# Patient Record
Sex: FEMALE | Race: WHITE | Employment: UNEMPLOYED | URBAN - NONMETROPOLITAN AREA
[De-identification: names, ages, dates, MRNs, and addresses within clinical notes are randomized per-mention and may not be internally consistent; named-entity substitution may affect disease eponyms.]

---

## 2017-01-06 ENCOUNTER — MED REC SCAN ONLY (OUTPATIENT)
Dept: FAMILY MEDICINE CLINIC | Facility: CLINIC | Age: 57
End: 2017-01-06

## 2017-01-12 ENCOUNTER — TELEPHONE (OUTPATIENT)
Dept: FAMILY MEDICINE CLINIC | Facility: CLINIC | Age: 57
End: 2017-01-12

## 2017-01-12 NOTE — TELEPHONE ENCOUNTER
No documentation regarding referral or order request. Notified Gricelda Khoury that she would have to wait to discuss with Dr Kathy Cunningham RN tomorrow. She voiced understanding and will keep lab results until notified otherwise.

## 2017-01-13 NOTE — TELEPHONE ENCOUNTER
Please notify Rivka Gotti that Dr. [facility] Gaitan group does not see Medicaid patients. Please refer her to [facility] rheumatology regarding her SLE.

## 2017-01-13 NOTE — TELEPHONE ENCOUNTER
Per last OV Dr Jorge Post was referring the patient to this doctor  Calling Pranav back to advise that the patient should be calling to schedule an appointment    They asked her insurance and I advised medicaid and they do not accept this insurance - if th

## 2017-01-19 ENCOUNTER — TELEPHONE (OUTPATIENT)
Dept: FAMILY MEDICINE CLINIC | Facility: CLINIC | Age: 57
End: 2017-01-19

## 2017-01-19 ENCOUNTER — OFFICE VISIT (OUTPATIENT)
Dept: FAMILY MEDICINE CLINIC | Facility: CLINIC | Age: 57
End: 2017-01-19

## 2017-01-19 VITALS
WEIGHT: 263 LBS | HEIGHT: 68.25 IN | SYSTOLIC BLOOD PRESSURE: 118 MMHG | HEART RATE: 75 BPM | BODY MASS INDEX: 39.86 KG/M2 | TEMPERATURE: 98 F | DIASTOLIC BLOOD PRESSURE: 80 MMHG | OXYGEN SATURATION: 95 %

## 2017-01-19 DIAGNOSIS — Z87.410 HISTORY OF CERVICAL DYSPLASIA: ICD-10-CM

## 2017-01-19 DIAGNOSIS — M32.9 SYSTEMIC LUPUS ERYTHEMATOSUS, UNSPECIFIED SLE TYPE, UNSPECIFIED ORGAN INVOLVEMENT STATUS (HCC): ICD-10-CM

## 2017-01-19 DIAGNOSIS — Z86.69 HISTORY OF SLEEP APNEA: ICD-10-CM

## 2017-01-19 DIAGNOSIS — Z79.891 ADMISSION FOR LONG-TERM OPIATE ANALGESIC USE: ICD-10-CM

## 2017-01-19 DIAGNOSIS — J44.9 CHRONIC OBSTRUCTIVE PULMONARY DISEASE, UNSPECIFIED COPD TYPE (HCC): Primary | ICD-10-CM

## 2017-01-19 DIAGNOSIS — Z86.711 HISTORY OF PULMONARY EMBOLUS (PE): ICD-10-CM

## 2017-01-19 DIAGNOSIS — Z79.01 LONG TERM (CURRENT) USE OF ANTICOAGULANTS: ICD-10-CM

## 2017-01-19 DIAGNOSIS — D12.6 TUBULAR ADENOMA OF COLON: ICD-10-CM

## 2017-01-19 DIAGNOSIS — E27.40 ADRENAL INSUFFICIENCY (HCC): ICD-10-CM

## 2017-01-19 LAB — INR: 2.3 (ref 0.8–1.3)

## 2017-01-19 PROCEDURE — 99214 OFFICE O/P EST MOD 30 MIN: CPT | Performed by: FAMILY MEDICINE

## 2017-01-19 RX ORDER — MAG HYDROX/ALUMINUM HYD/SIMETH 400-400-40
2000 SUSPENSION, ORAL (FINAL DOSE FORM) ORAL
COMMUNITY

## 2017-01-19 NOTE — PROGRESS NOTES
Coby Tony is a 61year old female. Patient presents with: Other: pt inr check & discuss health problems inrm 1      HPI:   We received many of the records from [facility] [address]   Specifically the records from her pulmonologist in her ob facility-administered medications on file prior to visit.      Past Medical History   Diagnosis Date   • HTN (hypertension)    • COPD (chronic obstructive pulmonary disease) [facility]      PFTs 8/2016, [facility]   • SLE (systemic lupus erythematosus) ([facility] unspecified      COMPARISON:  None.      TECHNIQUE:  PA and lateral chest radiographs were obtained.      PATIENT STATED HISTORY:  Routine follow-up. History of COPD,HTN, pulmonary embolism, and Lupus           FINDINGS:     LUNGS:  No consolidation.  Edison polyps especially tubular adenoma, she is at more risk. She will consider it in the future.       Orders Placed This Encounter  [facility]    No prescriptions requested or ordered in this encounter       Imaging & Con

## 2017-01-20 ENCOUNTER — TELEPHONE (OUTPATIENT)
Dept: FAMILY MEDICINE CLINIC | Facility: CLINIC | Age: 57
End: 2017-01-20

## 2017-01-20 RX ORDER — FOLIC ACID 1 MG/1
2 TABLET ORAL DAILY
Qty: 60 TABLET | Refills: 11 | Status: SHIPPED | OUTPATIENT
Start: 2017-01-20

## 2017-01-20 NOTE — TELEPHONE ENCOUNTER
Patient needs to call to schedule appointments at [facility] for [facility]ER[facility], pulmonology, and Rheumatology   [phone] scheduling dept for [facility] the patient to advise- I gave her the number

## 2017-02-06 RX ORDER — HYDROCODONE BITARTRATE AND ACETAMINOPHEN 5; 325 MG/1; MG/1
1 TABLET ORAL EVERY 6 HOURS PRN
Qty: 30 TABLET | Refills: 0 | Status: SHIPPED | OUTPATIENT
Start: 2017-02-06 | End: 2017-08-28 | Stop reason: ALTCHOICE

## 2017-02-06 NOTE — TELEPHONE ENCOUNTER
NEED SCRIPT FOR VICODIN, ALSO SHE IS HAVING TROUBLE GETTING [facility] TO GET OVERNIGHT OXIMETRY, ALSO HER PULMONOLOGIST DR NURA SANTANA, SHE HAS APPT WITH 3/8 @ [address] [place] [address] IN [place], THAT  WANTS DISC WITH ALL HER TEST RESULTS, ETC.. RELATED T

## 2017-02-06 NOTE — TELEPHONE ENCOUNTER
Calling Britton Nieto to advise that her script is ready for -   Left detailed message and the script is at the        I will be faxing order the [facility],   I will be faxing the records to the pulmonologist- we are not able to

## 2017-02-07 ENCOUNTER — MED REC SCAN ONLY (OUTPATIENT)
Dept: FAMILY MEDICINE CLINIC | Facility: CLINIC | Age: 57
End: 2017-02-07

## 2017-02-17 ENCOUNTER — NURSE ONLY (OUTPATIENT)
Dept: FAMILY MEDICINE CLINIC | Facility: CLINIC | Age: 57
End: 2017-02-17

## 2017-02-17 ENCOUNTER — TELEPHONE (OUTPATIENT)
Dept: FAMILY MEDICINE CLINIC | Facility: CLINIC | Age: 57
End: 2017-02-17

## 2017-02-17 DIAGNOSIS — I10 ESSENTIAL HYPERTENSION: Primary | ICD-10-CM

## 2017-02-17 DIAGNOSIS — M32.9 SYSTEMIC LUPUS ERYTHEMATOSUS, UNSPECIFIED SLE TYPE, UNSPECIFIED ORGAN INVOLVEMENT STATUS (HCC): ICD-10-CM

## 2017-02-17 DIAGNOSIS — E27.40 ADRENAL INSUFFICIENCY (HCC): ICD-10-CM

## 2017-02-17 DIAGNOSIS — Z86.711 HISTORY OF PULMONARY EMBOLUS (PE): Primary | ICD-10-CM

## 2017-02-17 LAB — INR: 2 (ref 0.8–1.2)

## 2017-02-17 PROCEDURE — 85610 PROTHROMBIN TIME: CPT | Performed by: FAMILY MEDICINE

## 2017-02-17 RX ORDER — WARFARIN SODIUM 10 MG/1
10 TABLET ORAL NIGHTLY
Refills: 0 | COMMUNITY
Start: 2017-02-17 | End: 2017-07-13

## 2017-02-17 NOTE — TELEPHONE ENCOUNTER
Patient is asking if she can have fasting labs next time she come in for her INR?    I advised that I will confirm with  and call her tomorrow on Saturday  Patient verbalized her understanding

## 2017-02-18 NOTE — TELEPHONE ENCOUNTER
----- Message from Rod Davila DO sent at 2/18/2017  7:43 AM CST -----  INR in therapeutic range  Continue same coumadin  Recheck INR  in 1 month

## 2017-03-16 RX ORDER — LORAZEPAM 0.5 MG/1
1 TABLET ORAL DAILY
Qty: 60 TABLET | Refills: 2 | Status: SHIPPED | OUTPATIENT
Start: 2017-03-16 | End: 2017-06-19

## 2017-03-17 ENCOUNTER — TELEPHONE (OUTPATIENT)
Dept: FAMILY MEDICINE CLINIC | Facility: CLINIC | Age: 57
End: 2017-03-17

## 2017-03-17 NOTE — TELEPHONE ENCOUNTER
Called the patient and advised that the insurance will no longer cover her Lorazepam so  would like her to come to discuss other alternatives.    The patient asked why it was not covered- I explained that I am not sure they sent a fax that it is not cover

## 2017-03-23 ENCOUNTER — LAB ENCOUNTER (OUTPATIENT)
Dept: LAB | Age: 57
End: 2017-03-23
Attending: FAMILY MEDICINE
Payer: MEDICAID

## 2017-03-23 ENCOUNTER — OFFICE VISIT (OUTPATIENT)
Dept: FAMILY MEDICINE CLINIC | Facility: CLINIC | Age: 57
End: 2017-03-23

## 2017-03-23 VITALS
DIASTOLIC BLOOD PRESSURE: 74 MMHG | OXYGEN SATURATION: 98 % | SYSTOLIC BLOOD PRESSURE: 130 MMHG | HEART RATE: 63 BPM | WEIGHT: 265.25 LBS | TEMPERATURE: 98 F | HEIGHT: 68.25 IN | BODY MASS INDEX: 40.2 KG/M2

## 2017-03-23 DIAGNOSIS — M32.9 SYSTEMIC LUPUS ERYTHEMATOSUS, UNSPECIFIED SLE TYPE, UNSPECIFIED ORGAN INVOLVEMENT STATUS (HCC): ICD-10-CM

## 2017-03-23 DIAGNOSIS — Z79.01 LONG TERM (CURRENT) USE OF ANTICOAGULANTS: ICD-10-CM

## 2017-03-23 DIAGNOSIS — I10 ESSENTIAL HYPERTENSION: ICD-10-CM

## 2017-03-23 DIAGNOSIS — E27.40 ADRENAL INSUFFICIENCY (HCC): ICD-10-CM

## 2017-03-23 DIAGNOSIS — Z86.711 HISTORY OF PULMONARY EMBOLUS (PE): ICD-10-CM

## 2017-03-23 DIAGNOSIS — N18.30 CKD (CHRONIC KIDNEY DISEASE) STAGE 3, GFR 30-59 ML/MIN (HCC): Primary | ICD-10-CM

## 2017-03-23 LAB
ALBUMIN SERPL-MCNC: 3.8 G/DL (ref 3.5–4.8)
ALP LIVER SERPL-CCNC: 62 U/L (ref 46–118)
ALT SERPL-CCNC: 17 U/L (ref 14–54)
AST SERPL-CCNC: 14 U/L (ref 15–41)
BASOPHILS # BLD AUTO: 0.06 X10(3) UL (ref 0–0.1)
BASOPHILS NFR BLD AUTO: 0.6 %
BILIRUB SERPL-MCNC: 0.4 MG/DL (ref 0.1–2)
BUN BLD-MCNC: 19 MG/DL (ref 8–20)
CALCIUM BLD-MCNC: 9 MG/DL (ref 8.3–10.3)
CHLORIDE: 105 MMOL/L (ref 101–111)
CHOLEST SMN-MCNC: 204 MG/DL (ref ?–200)
CO2: 27 MMOL/L (ref 22–32)
CREAT BLD-MCNC: 1.25 MG/DL (ref 0.55–1.02)
EOSINOPHIL # BLD AUTO: 0.16 X10(3) UL (ref 0–0.3)
EOSINOPHIL NFR BLD AUTO: 1.5 %
ERYTHROCYTE [DISTWIDTH] IN BLOOD BY AUTOMATED COUNT: 14.1 % (ref 11.5–16)
EST. AVERAGE GLUCOSE BLD GHB EST-MCNC: 111 MG/DL (ref 68–126)
GLUCOSE BLD-MCNC: 115 MG/DL (ref 70–99)
HBA1C MFR BLD HPLC: 5.5 % (ref ?–5.7)
HCT VFR BLD AUTO: 44.2 % (ref 34–50)
HDLC SERPL-MCNC: 66 MG/DL (ref 45–?)
HDLC SERPL: 3.09 {RATIO} (ref ?–4.44)
HGB BLD-MCNC: 14.6 G/DL (ref 12–16)
IMMATURE GRANULOCYTE COUNT: 0.08 X10(3) UL (ref 0–1)
IMMATURE GRANULOCYTE RATIO %: 0.8 %
INR: 2.1 (ref 0.8–1.2)
LDLC SERPL CALC-MCNC: 99 MG/DL (ref ?–130)
LYMPHOCYTES # BLD AUTO: 1.89 X10(3) UL (ref 0.9–4)
LYMPHOCYTES NFR BLD AUTO: 18.3 %
M PROTEIN MFR SERPL ELPH: 7.4 G/DL (ref 6.1–8.3)
MCH RBC QN AUTO: 31.5 PG (ref 27–33.2)
MCHC RBC AUTO-ENTMCNC: 33 G/DL (ref 31–37)
MCV RBC AUTO: 95.3 FL (ref 81–100)
MONOCYTES # BLD AUTO: 0.93 X10(3) UL (ref 0.1–0.6)
MONOCYTES NFR BLD AUTO: 9 %
NEUTROPHIL ABS PRELIM: 7.21 X10 (3) UL (ref 1.3–6.7)
NEUTROPHILS # BLD AUTO: 7.21 X10(3) UL (ref 1.3–6.7)
NEUTROPHILS NFR BLD AUTO: 69.8 %
NONHDLC SERPL-MCNC: 138 MG/DL (ref ?–130)
PLATELET # BLD AUTO: 237 10(3)UL (ref 150–450)
POTASSIUM SERPL-SCNC: 3.6 MMOL/L (ref 3.6–5.1)
RBC # BLD AUTO: 4.64 X10(6)UL (ref 3.8–5.1)
RED CELL DISTRIBUTION WIDTH-SD: 49.2 FL (ref 35.1–46.3)
SODIUM SERPL-SCNC: 140 MMOL/L (ref 136–144)
TRIGLYCERIDES: 197 MG/DL (ref ?–150)
VLDL: 39 MG/DL (ref 5–40)
WBC # BLD AUTO: 10.3 X10(3) UL (ref 4–13)

## 2017-03-23 PROCEDURE — 80053 COMPREHEN METABOLIC PANEL: CPT

## 2017-03-23 PROCEDURE — 36415 COLL VENOUS BLD VENIPUNCTURE: CPT

## 2017-03-23 PROCEDURE — 85610 PROTHROMBIN TIME: CPT | Performed by: FAMILY MEDICINE

## 2017-03-23 PROCEDURE — 80061 LIPID PANEL: CPT

## 2017-03-23 PROCEDURE — 99214 OFFICE O/P EST MOD 30 MIN: CPT | Performed by: FAMILY MEDICINE

## 2017-03-23 PROCEDURE — 85025 COMPLETE CBC W/AUTO DIFF WBC: CPT

## 2017-03-23 PROCEDURE — 83036 HEMOGLOBIN GLYCOSYLATED A1C: CPT

## 2017-03-23 RX ORDER — TRAMADOL HYDROCHLORIDE 50 MG/1
50 TABLET ORAL EVERY 6 HOURS PRN
Qty: 30 TABLET | Refills: 1 | Status: SHIPPED | OUTPATIENT
Start: 2017-03-23 | End: 2017-04-10

## 2017-03-23 NOTE — PROGRESS NOTES
Earnestine Gonzalez is a 61year old female. Patient presents with: Other: discuss meds, fasting labs, INR. ... room1      HPI:   Patient has been on Lorazepam.  Recent refill was denied by public aid.   Explained to the patient we need to try a preventati • History of pulmonary embolus (PE)      2014, right sided   • Chronic low back pain    • Adrenal insufficiency (HCC)      due to steroids   • JOHN (obstructive sleep apnea)      Unable to tolerate CPAP   • Positive cardiolipin antibodies      Borderline organ involvement status (hcc)  Long term (current) use of anticoagulants    Her INR is therapeutic. Repeat in 1 month. Reviewed her recent lab. Need to recheck chemistry profile. Encouraged her to follow-up with rheumatologist regarding her SLE.   She

## 2017-03-24 NOTE — PROGRESS NOTES
Quick Note:    Notify lipids are at goal.   Notify chemistry profile is unremarkable. Recheck in 6 months. Notify HGbA1C is controlled. Notify CBC normal. No evidence of anemia or of an acute infection.     ______

## 2017-04-01 ENCOUNTER — MED REC SCAN ONLY (OUTPATIENT)
Dept: FAMILY MEDICINE CLINIC | Facility: CLINIC | Age: 57
End: 2017-04-01

## 2017-04-04 ENCOUNTER — TELEPHONE (OUTPATIENT)
Dept: FAMILY MEDICINE CLINIC | Facility: CLINIC | Age: 57
End: 2017-04-04

## 2017-04-04 NOTE — TELEPHONE ENCOUNTER
QUESTIONS ON TRAMADOL, INSERT SAYS DO NOT TAKE IF YOU ARE ON LORAZEPAM, PT DOES NOT TAKE IT ALL THE TIME BUT SOMETIMES, IS IT OK TO BE TAKING TRAMADOL WITH LORAZEPAM?      PLEASE ADVISE

## 2017-04-10 ENCOUNTER — TELEPHONE (OUTPATIENT)
Dept: FAMILY MEDICINE CLINIC | Facility: CLINIC | Age: 57
End: 2017-04-10

## 2017-04-10 RX ORDER — TRAMADOL HYDROCHLORIDE 50 MG/1
50 TABLET ORAL EVERY 6 HOURS PRN
Qty: 60 TABLET | Refills: 1 | Status: SHIPPED | OUTPATIENT
Start: 2017-04-10 | End: 2017-06-19

## 2017-04-10 NOTE — TELEPHONE ENCOUNTER
DR CHANGED VICODIN TO TRAMADOL, & IT'S WORKING WELL, SHE TAKES 2 TRAMADOL A DAY, WAS PRESCRIBED QTY 30, CAN DR PRESCRIBE HIGHER QTY SO SHE DOESN'T HAVE TO FIGHT WITH INS, SHE HAS MEDICAID    She has to worry about the 4 prescription override every month  T

## 2017-04-14 ENCOUNTER — NURSE ONLY (OUTPATIENT)
Dept: FAMILY MEDICINE CLINIC | Facility: CLINIC | Age: 57
End: 2017-04-14

## 2017-04-14 DIAGNOSIS — Z86.711 HISTORY OF PULMONARY EMBOLUS (PE): Primary | ICD-10-CM

## 2017-04-14 PROCEDURE — 85610 PROTHROMBIN TIME: CPT | Performed by: FAMILY MEDICINE

## 2017-04-24 RX ORDER — PREDNISONE 1 MG/1
TABLET ORAL
Qty: 30 TABLET | Refills: 0 | OUTPATIENT
Start: 2017-04-24

## 2017-04-24 RX ORDER — PREDNISONE 1 MG/1
5 TABLET ORAL DAILY
Qty: 60 TABLET | Refills: 2 | Status: SHIPPED | OUTPATIENT
Start: 2017-04-24 | End: 2017-07-22

## 2017-04-28 RX ORDER — PREDNISONE 1 MG/1
TABLET ORAL
Qty: 30 TABLET | Refills: 0 | OUTPATIENT
Start: 2017-04-28

## 2017-05-22 ENCOUNTER — TELEPHONE (OUTPATIENT)
Dept: FAMILY MEDICINE CLINIC | Facility: CLINIC | Age: 57
End: 2017-05-22

## 2017-05-22 ENCOUNTER — MED REC SCAN ONLY (OUTPATIENT)
Dept: FAMILY MEDICINE CLINIC | Facility: CLINIC | Age: 57
End: 2017-05-22

## 2017-05-22 NOTE — TELEPHONE ENCOUNTER
Future Appointments  Date Time Provider Meredith Jeter   5/23/2017 8:00 AM Oly Alvarez DO [facility] [place]

## 2017-05-23 ENCOUNTER — OFFICE VISIT (OUTPATIENT)
Dept: FAMILY MEDICINE CLINIC | Facility: CLINIC | Age: 57
End: 2017-05-23

## 2017-05-23 ENCOUNTER — TELEPHONE (OUTPATIENT)
Dept: FAMILY MEDICINE CLINIC | Facility: CLINIC | Age: 57
End: 2017-05-23

## 2017-05-23 VITALS
SYSTOLIC BLOOD PRESSURE: 112 MMHG | DIASTOLIC BLOOD PRESSURE: 72 MMHG | HEART RATE: 84 BPM | BODY MASS INDEX: 40.47 KG/M2 | HEIGHT: 68.25 IN | WEIGHT: 267 LBS | OXYGEN SATURATION: 99 % | TEMPERATURE: 98 F

## 2017-05-23 DIAGNOSIS — J01.00 ACUTE NON-RECURRENT MAXILLARY SINUSITIS: Primary | ICD-10-CM

## 2017-05-23 DIAGNOSIS — J30.2 SEASONAL ALLERGIC RHINITIS, UNSPECIFIED ALLERGIC RHINITIS TRIGGER: ICD-10-CM

## 2017-05-23 PROCEDURE — 99213 OFFICE O/P EST LOW 20 MIN: CPT | Performed by: FAMILY MEDICINE

## 2017-05-23 RX ORDER — FLUTICASONE PROPIONATE 50 MCG
2 SPRAY, SUSPENSION (ML) NASAL DAILY
Qty: 1 BOTTLE | Refills: 3 | Status: SHIPPED | OUTPATIENT
Start: 2017-05-23 | End: 2017-08-28 | Stop reason: ALTCHOICE

## 2017-05-23 RX ORDER — AMOXICILLIN AND CLAVULANATE POTASSIUM 875; 125 MG/1; MG/1
1 TABLET, FILM COATED ORAL 2 TIMES DAILY
Qty: 20 TABLET | Refills: 0 | Status: SHIPPED | OUTPATIENT
Start: 2017-05-23 | End: 2017-08-28 | Stop reason: ALTCHOICE

## 2017-05-23 RX ORDER — LORATADINE 10 MG/1
10 TABLET ORAL DAILY
Qty: 90 TABLET | Refills: 0 | COMMUNITY
Start: 2017-05-23 | End: 2020-09-01 | Stop reason: ALTCHOICE

## 2017-05-23 NOTE — PROGRESS NOTES
Len Brandt is a 61year old female. Patient presents with: Other: \"allergies that are causing a URI\", cough, congestion-started \"last week one day with sinus infection and allergies\". ..... room 1      HPI:   Patient complains of sinus conges ([facility]      PFTs 8/2016, [facility]   • SLE (systemic lupus erythematosus) ([facility]      sister and brother also have   • History of pulmonary embolus (PE)      2014, right sided   • Chronic low back pain    • Adrenal insufficiency ([facility]      due to steroids only definitive means to diagnose a bacterial sinus infection is to aspirate fluid from the sinus cavity and then culture that fluid. That is a painful and impractical procedure in an office setting.  The choice to try an antibiotic is based on the length of

## 2017-05-23 NOTE — TELEPHONE ENCOUNTER
fax received from the pharmacy this morning advising that the patient has a penicillin allergy? ? We do not show that  I called the patient and left message to confirm

## 2017-05-23 NOTE — TELEPHONE ENCOUNTER
----- Message from Prasad Perea sent at 5/23/2017 10:26 AM CDT -----  Martha Wolfe is not allergic to penicillin.

## 2017-05-27 ENCOUNTER — TELEPHONE (OUTPATIENT)
Dept: FAMILY MEDICINE CLINIC | Facility: CLINIC | Age: 57
End: 2017-05-27

## 2017-06-19 RX ORDER — LORAZEPAM 0.5 MG/1
1 TABLET ORAL DAILY
Qty: 60 TABLET | Refills: 2 | Status: SHIPPED | OUTPATIENT
Start: 2017-06-19 | End: 2017-07-18

## 2017-06-19 RX ORDER — TRAMADOL HYDROCHLORIDE 50 MG/1
50 TABLET ORAL EVERY 6 HOURS PRN
Qty: 60 TABLET | Refills: 1 | Status: SHIPPED | OUTPATIENT
Start: 2017-06-19 | End: 2017-07-18

## 2017-06-19 RX ORDER — TRAMADOL HYDROCHLORIDE 50 MG/1
TABLET ORAL
Qty: 60 TABLET | Refills: 0
Start: 2017-06-19

## 2017-07-03 ENCOUNTER — MED REC SCAN ONLY (OUTPATIENT)
Dept: FAMILY MEDICINE CLINIC | Facility: CLINIC | Age: 57
End: 2017-07-03

## 2017-07-13 ENCOUNTER — NURSE ONLY (OUTPATIENT)
Dept: FAMILY MEDICINE CLINIC | Facility: CLINIC | Age: 57
End: 2017-07-13

## 2017-07-13 DIAGNOSIS — Z79.899 MEDICATION MANAGEMENT: ICD-10-CM

## 2017-07-13 DIAGNOSIS — Z86.711 HISTORY OF PULMONARY EMBOLUS (PE): Primary | ICD-10-CM

## 2017-07-13 DIAGNOSIS — Z79.01 WARFARIN ANTICOAGULATION: ICD-10-CM

## 2017-07-13 LAB — INR: 1.9 (ref 0.8–1.2)

## 2017-07-13 PROCEDURE — 85610 PROTHROMBIN TIME: CPT | Performed by: FAMILY MEDICINE

## 2017-07-13 RX ORDER — WARFARIN SODIUM 10 MG/1
TABLET ORAL
Refills: 0 | COMMUNITY
Start: 2017-07-13 | End: 2017-07-22

## 2017-07-13 RX ORDER — WARFARIN SODIUM 1 MG/1
TABLET ORAL
Refills: 0 | COMMUNITY
Start: 2017-07-13 | End: 2017-08-28 | Stop reason: ALTCHOICE

## 2017-07-13 NOTE — PROGRESS NOTES
INR is subtherapeutic  Need to increase from current dose of coumadin to 11 mg q hs  Recheck INR in   5-7   days

## 2017-07-19 ENCOUNTER — NURSE ONLY (OUTPATIENT)
Dept: FAMILY MEDICINE CLINIC | Facility: CLINIC | Age: 57
End: 2017-07-19

## 2017-07-19 DIAGNOSIS — Z79.899 MEDICATION MANAGEMENT: ICD-10-CM

## 2017-07-19 DIAGNOSIS — Z79.01 WARFARIN ANTICOAGULATION: Primary | ICD-10-CM

## 2017-07-19 LAB — INR: 2.8 (ref 0.8–1.2)

## 2017-07-19 PROCEDURE — 85610 PROTHROMBIN TIME: CPT | Performed by: FAMILY MEDICINE

## 2017-07-19 RX ORDER — LORAZEPAM 0.5 MG/1
TABLET ORAL
Qty: 60 TABLET | Refills: 0 | Status: SHIPPED | OUTPATIENT
Start: 2017-07-19 | End: 2017-08-17

## 2017-07-19 RX ORDER — TRAMADOL HYDROCHLORIDE 50 MG/1
TABLET ORAL
Qty: 60 TABLET | Refills: 0 | Status: SHIPPED | OUTPATIENT
Start: 2017-07-19 | End: 2017-08-17

## 2017-07-19 RX ORDER — LORAZEPAM 0.5 MG/1
TABLET ORAL
Qty: 60 TABLET | Refills: 0 | Status: SHIPPED | OUTPATIENT
Start: 2017-07-19 | End: 2017-09-21

## 2017-07-22 RX ORDER — WARFARIN SODIUM 10 MG/1
TABLET ORAL
Qty: 90 TABLET | Refills: 0 | Status: SHIPPED | OUTPATIENT
Start: 2017-07-22 | End: 2017-08-28 | Stop reason: ALTCHOICE

## 2017-07-22 RX ORDER — PREDNISONE 1 MG/1
5 TABLET ORAL DAILY
Qty: 60 TABLET | Refills: 2 | Status: SHIPPED | OUTPATIENT
Start: 2017-07-22 | End: 2018-01-06

## 2017-08-16 ENCOUNTER — NURSE ONLY (OUTPATIENT)
Dept: FAMILY MEDICINE CLINIC | Facility: CLINIC | Age: 57
End: 2017-08-16

## 2017-08-16 DIAGNOSIS — Z79.899 MEDICATION MANAGEMENT: Primary | ICD-10-CM

## 2017-08-16 DIAGNOSIS — Z79.01 WARFARIN ANTICOAGULATION: ICD-10-CM

## 2017-08-16 LAB — INR: 4.7 (ref 0.8–1.2)

## 2017-08-16 PROCEDURE — 85610 PROTHROMBIN TIME: CPT | Performed by: FAMILY MEDICINE

## 2017-08-17 RX ORDER — TRAMADOL HYDROCHLORIDE 50 MG/1
TABLET ORAL
Qty: 60 TABLET | Refills: 0 | Status: SHIPPED
Start: 2017-08-17 | End: 2017-08-28 | Stop reason: SINTOL

## 2017-08-17 RX ORDER — LORAZEPAM 0.5 MG/1
TABLET ORAL
Qty: 60 TABLET | Refills: 0 | Status: SHIPPED
Start: 2017-08-17 | End: 2017-08-28 | Stop reason: ALTCHOICE

## 2017-08-22 ENCOUNTER — NURSE ONLY (OUTPATIENT)
Dept: FAMILY MEDICINE CLINIC | Facility: CLINIC | Age: 57
End: 2017-08-22

## 2017-08-22 ENCOUNTER — TELEPHONE (OUTPATIENT)
Dept: FAMILY MEDICINE CLINIC | Facility: CLINIC | Age: 57
End: 2017-08-22

## 2017-08-22 DIAGNOSIS — Z79.899 MEDICATION MANAGEMENT: Primary | ICD-10-CM

## 2017-08-22 DIAGNOSIS — Z79.01 WARFARIN ANTICOAGULATION: ICD-10-CM

## 2017-08-22 LAB — INR: 2.5 (ref 0.8–1.2)

## 2017-08-22 PROCEDURE — 85610 PROTHROMBIN TIME: CPT | Performed by: FAMILY MEDICINE

## 2017-08-22 NOTE — TELEPHONE ENCOUNTER
----- Message from Jaylene Cheng DO sent at 8/22/2017 12:00 PM CDT -----  INR in therapeutic range  Continue same coumadin  Recheck INR  in 1 month

## 2017-08-22 NOTE — TELEPHONE ENCOUNTER
Patient wants to go back on the Vicodin for a month. Ever since she has started the Tramadol she has been constipated and not having regular bowel movements. When she was on her Vicodin she was regular.      I asked if she takes a stool softener and she de la vega

## 2017-08-23 NOTE — TELEPHONE ENCOUNTER
Calling the patient- left message that she will need to make an appointment   Advised about the INR result

## 2017-08-28 ENCOUNTER — OFFICE VISIT (OUTPATIENT)
Dept: FAMILY MEDICINE CLINIC | Facility: CLINIC | Age: 57
End: 2017-08-28

## 2017-08-28 VITALS
BODY MASS INDEX: 39.29 KG/M2 | HEART RATE: 64 BPM | WEIGHT: 259.25 LBS | TEMPERATURE: 98 F | SYSTOLIC BLOOD PRESSURE: 110 MMHG | DIASTOLIC BLOOD PRESSURE: 76 MMHG | HEIGHT: 68.25 IN

## 2017-08-28 DIAGNOSIS — M32.9 SYSTEMIC LUPUS ERYTHEMATOSUS, UNSPECIFIED SLE TYPE, UNSPECIFIED ORGAN INVOLVEMENT STATUS (HCC): ICD-10-CM

## 2017-08-28 DIAGNOSIS — M54.50 CHRONIC MIDLINE LOW BACK PAIN WITHOUT SCIATICA: Primary | ICD-10-CM

## 2017-08-28 DIAGNOSIS — Z79.899 MEDICATION MANAGEMENT: ICD-10-CM

## 2017-08-28 DIAGNOSIS — Z79.01 WARFARIN ANTICOAGULATION: ICD-10-CM

## 2017-08-28 DIAGNOSIS — Z86.711 HISTORY OF PULMONARY EMBOLUS (PE): ICD-10-CM

## 2017-08-28 DIAGNOSIS — G89.29 CHRONIC MIDLINE LOW BACK PAIN WITHOUT SCIATICA: Primary | ICD-10-CM

## 2017-08-28 LAB — INR: 2.3 (ref 0.8–1.2)

## 2017-08-28 PROCEDURE — 85610 PROTHROMBIN TIME: CPT | Performed by: FAMILY MEDICINE

## 2017-08-28 PROCEDURE — 99214 OFFICE O/P EST MOD 30 MIN: CPT | Performed by: FAMILY MEDICINE

## 2017-08-28 RX ORDER — HYDROCODONE BITARTRATE AND ACETAMINOPHEN 5; 325 MG/1; MG/1
1 TABLET ORAL EVERY 6 HOURS PRN
Qty: 30 TABLET | Refills: 0 | Status: SHIPPED | OUTPATIENT
Start: 2017-08-28 | End: 2017-11-22

## 2017-08-28 RX ORDER — WARFARIN SODIUM 4 MG/1
TABLET ORAL
Qty: 30 TABLET | COMMUNITY
Start: 2017-08-28 | End: 2017-10-25

## 2017-08-28 RX ORDER — WARFARIN SODIUM 4 MG/1
4 TABLET ORAL DAILY
Qty: 30 TABLET | Status: SHIPPED | OUTPATIENT
Start: 2017-08-28 | End: 2017-08-28

## 2017-08-28 NOTE — PROGRESS NOTES
Mp Reaves is a 61year old female. Patient presents with: Other: med check. .... HPI:   She has a history of severe low back pain. She does not get relief with Tylenol. She cannot take nonsteroidal anti-inflammatories.   She has been usin Take 1 tablet by mouth every 6 (six) hours as needed for Pain. Disp: 30 tablet Rfl: 0     No current facility-administered medications on file prior to visit.       Past Medical History:   Diagnosis Date   • Adrenal insufficiency ([facility]     due to steroids extremities. Able to stand on toes of both feet for 5 seconds w/o weakness.   SLR neg bilat      Recent Results (from the past 24 hour(s))  -PROTHROMBIN TIME   Collection Time: 08/28/17 10:20 AM   Result Value Ref Range   INR 2.3 (A) 0.8 - 1.2   Test Strip

## 2017-09-21 RX ORDER — BENAZEPRIL HYDROCHLORIDE 20 MG/1
20 TABLET ORAL DAILY
Qty: 90 TABLET | Refills: 0 | Status: SHIPPED | OUTPATIENT
Start: 2017-09-21 | End: 2017-12-17

## 2017-09-22 RX ORDER — LORAZEPAM 0.5 MG/1
TABLET ORAL
Qty: 60 TABLET | Refills: 0 | Status: SHIPPED | OUTPATIENT
Start: 2017-09-22 | End: 2017-11-10

## 2017-10-18 ENCOUNTER — TELEPHONE (OUTPATIENT)
Dept: FAMILY MEDICINE CLINIC | Facility: CLINIC | Age: 57
End: 2017-10-18

## 2017-10-18 NOTE — TELEPHONE ENCOUNTER
Recommend she take plain generic mucinex 600 mg bid and use flonase nasal spray  See me if no relief in 3-4 days

## 2017-10-18 NOTE — TELEPHONE ENCOUNTER
The patient called back   She has a head cold and it always goes into her chest     She is taking a generic pharmacy OTC sinus congestion pill.  She only took one this morning because on the box it states specifically that if taking warfarin need to contact

## 2017-10-18 NOTE — TELEPHONE ENCOUNTER
SINUS PRESSURE PATIENT IS ASKING IF SHE CAN TAKE AN OTC MEDICATION BUT BOX STATES TO CALL DOCTOR IF TAKING WARFARIN, AND SHE IS ON WARFARIN.   PLEASE ADVISE

## 2017-10-25 ENCOUNTER — NURSE ONLY (OUTPATIENT)
Dept: FAMILY MEDICINE CLINIC | Facility: CLINIC | Age: 57
End: 2017-10-25

## 2017-10-25 DIAGNOSIS — Z79.899 MEDICATION MANAGEMENT: Primary | ICD-10-CM

## 2017-10-25 DIAGNOSIS — Z79.01 WARFARIN ANTICOAGULATION: ICD-10-CM

## 2017-10-25 PROCEDURE — 85610 PROTHROMBIN TIME: CPT | Performed by: FAMILY MEDICINE

## 2017-10-25 RX ORDER — WARFARIN SODIUM 4 MG/1
TABLET ORAL
Qty: 30 TABLET | Status: SHIPPED | OUTPATIENT
Start: 2017-10-25 | End: 2017-12-05 | Stop reason: DRUGHIGH

## 2017-10-25 RX ORDER — WARFARIN SODIUM 5 MG/1
TABLET ORAL
Refills: 0 | COMMUNITY
Start: 2017-10-25 | End: 2017-12-05

## 2017-11-10 RX ORDER — WARFARIN SODIUM 10 MG/1
TABLET ORAL
Qty: 90 TABLET | Refills: 0 | OUTPATIENT
Start: 2017-11-10

## 2017-11-10 RX ORDER — LORAZEPAM 0.5 MG/1
TABLET ORAL
Qty: 60 TABLET | Refills: 0 | Status: SHIPPED
Start: 2017-11-10 | End: 2017-12-29

## 2017-11-13 ENCOUNTER — TELEPHONE (OUTPATIENT)
Dept: FAMILY MEDICINE CLINIC | Facility: CLINIC | Age: 57
End: 2017-11-13

## 2017-11-13 NOTE — TELEPHONE ENCOUNTER
DID WE GET RECORDS FROM DR Sabrina Mendoza IN [place]? ALSO PT SAID [facility] HAS NOT CORRECTED HER PCP  [address], STILL TRYING TO FILL SCRIPTS THRU HER OLD PCP, CAN WE CALL & FIX THIS?

## 2017-11-16 NOTE — TELEPHONE ENCOUNTER
Called the patient and she advised that Dr Chinmay Johns was her gynecologist. I have the records. She is also having an issue with Agnes trying to get her doctors in [place] to fill medications and she has not been there for 18 months.  She has been

## 2017-11-22 ENCOUNTER — TELEPHONE (OUTPATIENT)
Dept: FAMILY MEDICINE CLINIC | Facility: CLINIC | Age: 57
End: 2017-11-22

## 2017-11-22 RX ORDER — HYDROCODONE BITARTRATE AND ACETAMINOPHEN 5; 325 MG/1; MG/1
1 TABLET ORAL EVERY 6 HOURS PRN
Qty: 30 TABLET | Refills: 0 | Status: SHIPPED | OUTPATIENT
Start: 2017-11-22 | End: 2018-02-08

## 2017-11-28 ENCOUNTER — NURSE ONLY (OUTPATIENT)
Dept: FAMILY MEDICINE CLINIC | Facility: CLINIC | Age: 57
End: 2017-11-28

## 2017-11-28 DIAGNOSIS — Z79.899 MEDICATION MANAGEMENT: Primary | ICD-10-CM

## 2017-11-28 DIAGNOSIS — Z79.01 WARFARIN ANTICOAGULATION: ICD-10-CM

## 2017-11-28 PROCEDURE — 85610 PROTHROMBIN TIME: CPT | Performed by: FAMILY MEDICINE

## 2017-12-05 ENCOUNTER — NURSE ONLY (OUTPATIENT)
Dept: FAMILY MEDICINE CLINIC | Facility: CLINIC | Age: 57
End: 2017-12-05

## 2017-12-05 DIAGNOSIS — Z79.899 MEDICATION MANAGEMENT: Primary | ICD-10-CM

## 2017-12-05 DIAGNOSIS — Z79.01 WARFARIN ANTICOAGULATION: ICD-10-CM

## 2017-12-05 PROCEDURE — 85610 PROTHROMBIN TIME: CPT | Performed by: FAMILY MEDICINE

## 2017-12-05 RX ORDER — WARFARIN SODIUM 5 MG/1
TABLET ORAL
Refills: 0 | COMMUNITY
Start: 2017-12-05 | End: 2018-07-16

## 2017-12-05 NOTE — PROGRESS NOTES
INR is subtherapeutic  Need to increase from current dose of coumadin to 10 mg q hs  Recheck INR in  5-7    days

## 2017-12-12 ENCOUNTER — NURSE ONLY (OUTPATIENT)
Dept: FAMILY MEDICINE CLINIC | Facility: CLINIC | Age: 57
End: 2017-12-12

## 2017-12-12 DIAGNOSIS — Z79.899 MEDICATION MANAGEMENT: Primary | ICD-10-CM

## 2017-12-12 DIAGNOSIS — Z79.01 WARFARIN ANTICOAGULATION: ICD-10-CM

## 2017-12-12 PROCEDURE — 85610 PROTHROMBIN TIME: CPT | Performed by: FAMILY MEDICINE

## 2017-12-18 RX ORDER — BENAZEPRIL HYDROCHLORIDE 20 MG/1
TABLET ORAL
Qty: 90 TABLET | Refills: 0 | Status: SHIPPED | OUTPATIENT
Start: 2017-12-18 | End: 2018-03-27

## 2017-12-18 NOTE — TELEPHONE ENCOUNTER
Last office visit 8-28-17 110/76  Last refill 9-21-17 #90  Left detailed message advising patient she needs BP check with nurse.

## 2017-12-20 ENCOUNTER — TELEPHONE (OUTPATIENT)
Dept: FAMILY MEDICINE CLINIC | Facility: CLINIC | Age: 57
End: 2017-12-20

## 2017-12-20 ENCOUNTER — NURSE ONLY (OUTPATIENT)
Dept: FAMILY MEDICINE CLINIC | Facility: CLINIC | Age: 57
End: 2017-12-20

## 2017-12-20 VITALS — DIASTOLIC BLOOD PRESSURE: 74 MMHG | SYSTOLIC BLOOD PRESSURE: 120 MMHG

## 2017-12-29 RX ORDER — WARFARIN SODIUM 10 MG/1
TABLET ORAL
Qty: 90 TABLET | Refills: 0 | Status: SHIPPED | OUTPATIENT
Start: 2017-12-29 | End: 2018-03-27

## 2017-12-30 NOTE — TELEPHONE ENCOUNTER
Confirmed with pharmacy that patient has remaining refills on file. She states patient called pharmacy and is aware.   Jenifer Amaro, 12/30/17, 9:44 AM

## 2018-01-02 RX ORDER — LORAZEPAM 0.5 MG/1
TABLET ORAL
Qty: 60 TABLET | Refills: 0 | Status: SHIPPED | OUTPATIENT
Start: 2018-01-02 | End: 2018-04-22

## 2018-01-05 RX ORDER — CYCLOBENZAPRINE HCL 10 MG
TABLET ORAL
Qty: 90 TABLET | Refills: 0 | Status: SHIPPED | OUTPATIENT
Start: 2018-01-05 | End: 2018-02-24

## 2018-01-05 RX ORDER — HYDROCHLOROTHIAZIDE 25 MG/1
TABLET ORAL
Qty: 135 TABLET | Refills: 0 | Status: SHIPPED | OUTPATIENT
Start: 2018-01-05 | End: 2018-08-03

## 2018-01-05 NOTE — TELEPHONE ENCOUNTER
Last OV 8/28/17  Last ordered cyclobenzaprine 9/29/16 for a year  According to the pharmacy, the hctz was last ordered 6/6/17, #135, she uses 1/2 tab.   Last b/p 12/20/17 =120/74

## 2018-01-06 RX ORDER — PREDNISONE 1 MG/1
TABLET ORAL
Qty: 60 TABLET | Refills: 0 | Status: SHIPPED | OUTPATIENT
Start: 2018-01-06 | End: 2018-11-30

## 2018-02-08 ENCOUNTER — TELEPHONE (OUTPATIENT)
Dept: FAMILY MEDICINE CLINIC | Facility: CLINIC | Age: 58
End: 2018-02-08

## 2018-02-08 RX ORDER — HYDROCODONE BITARTRATE AND ACETAMINOPHEN 5; 325 MG/1; MG/1
1 TABLET ORAL EVERY 6 HOURS PRN
Qty: 30 TABLET | Refills: 0 | Status: SHIPPED | OUTPATIENT
Start: 2018-02-08 | End: 2018-05-15

## 2018-02-21 ENCOUNTER — OFFICE VISIT (OUTPATIENT)
Dept: FAMILY MEDICINE CLINIC | Facility: CLINIC | Age: 58
End: 2018-02-21

## 2018-02-21 VITALS
BODY MASS INDEX: 39 KG/M2 | HEART RATE: 68 BPM | DIASTOLIC BLOOD PRESSURE: 70 MMHG | TEMPERATURE: 98 F | WEIGHT: 261.38 LBS | SYSTOLIC BLOOD PRESSURE: 120 MMHG | OXYGEN SATURATION: 99 %

## 2018-02-21 DIAGNOSIS — Z79.01 WARFARIN ANTICOAGULATION: ICD-10-CM

## 2018-02-21 DIAGNOSIS — Z86.711 HISTORY OF PULMONARY EMBOLUS (PE): ICD-10-CM

## 2018-02-21 DIAGNOSIS — L30.9 ECZEMA, UNSPECIFIED TYPE: Primary | ICD-10-CM

## 2018-02-21 DIAGNOSIS — Z79.899 MEDICATION MANAGEMENT: ICD-10-CM

## 2018-02-21 LAB — INR: 2.7 (ref 0.8–1.2)

## 2018-02-21 PROCEDURE — 85610 PROTHROMBIN TIME: CPT | Performed by: FAMILY MEDICINE

## 2018-02-21 PROCEDURE — 99213 OFFICE O/P EST LOW 20 MIN: CPT | Performed by: FAMILY MEDICINE

## 2018-02-21 RX ORDER — MOMETASONE FUROATE 1 MG/G
1 CREAM TOPICAL 2 TIMES DAILY PRN
Qty: 45 G | Refills: 0 | Status: SHIPPED | OUTPATIENT
Start: 2018-02-21

## 2018-02-21 RX ORDER — AMOXICILLIN AND CLAVULANATE POTASSIUM 875; 125 MG/1; MG/1
1 TABLET, FILM COATED ORAL 2 TIMES DAILY
Qty: 20 TABLET | Refills: 0 | Status: SHIPPED | OUTPATIENT
Start: 2018-02-21 | End: 2018-07-16 | Stop reason: ALTCHOICE

## 2018-02-21 NOTE — PROGRESS NOTES
Caryl Palma is a 61year old female. Patient presents with: Other: rashes on lower back for months--rashes on ankles now-nothing working. .. Antoine Fragoso INR. ... room 1      HPI:   Patient complains of a rash to her lower back into her lower legs.   Slightly i Chronic low back pain    • COPD (chronic obstructive pulmonary disease) (HCC)     PFTs 8/2016, [facility]   • History of pulmonary embolus (PE)     2014, right sided   • HTN (hypertension)    • JOHN (obstructive sleep apnea)     Unable to tolerate CPAP   • P 2 (two) times daily.            Imaging & Consults:  None

## 2018-02-26 RX ORDER — CYCLOBENZAPRINE HCL 10 MG
TABLET ORAL
Qty: 90 TABLET | Refills: 0 | Status: SHIPPED | OUTPATIENT
Start: 2018-02-26 | End: 2018-04-26

## 2018-03-19 ENCOUNTER — TELEPHONE (OUTPATIENT)
Dept: FAMILY MEDICINE CLINIC | Facility: CLINIC | Age: 58
End: 2018-03-19

## 2018-03-19 NOTE — TELEPHONE ENCOUNTER
Calling the patient-     She has an infected tooth and started the antibiotic that Dr prescribed her in case if she needed it. Then she also is having the tooth pulled and wants to know when she needs to stop her warfarin?      I advised for a pulled tooth

## 2018-03-21 ENCOUNTER — TELEPHONE (OUTPATIENT)
Dept: FAMILY MEDICINE CLINIC | Facility: CLINIC | Age: 58
End: 2018-03-21

## 2018-03-21 NOTE — TELEPHONE ENCOUNTER
Pt. Just had a tooth pulled and the dentist gave her augmentin 875mg. She thought Dr. Arely Davidson told her not to take augmentin?

## 2018-03-27 RX ORDER — WARFARIN SODIUM 10 MG/1
TABLET ORAL
Qty: 90 TABLET | Refills: 0 | Status: SHIPPED | OUTPATIENT
Start: 2018-03-27 | End: 2018-06-25

## 2018-03-27 RX ORDER — BENAZEPRIL HYDROCHLORIDE 20 MG/1
TABLET ORAL
Qty: 90 TABLET | Refills: 0 | Status: SHIPPED | OUTPATIENT
Start: 2018-03-27 | End: 2018-06-27

## 2018-03-27 NOTE — TELEPHONE ENCOUNTER
Last office visit 2-21-18 120/70  Last refill Benazepril 12-18-17 #90  Last refill Warfarin 10mg 12-29-17 #90  Last INR 2-21-18  Future Appointments  Date Time Provider Meredith Jeter   3/30/2018 11:00 AM [facility] [place]

## 2018-03-29 ENCOUNTER — TELEPHONE (OUTPATIENT)
Dept: FAMILY MEDICINE CLINIC | Facility: CLINIC | Age: 58
End: 2018-03-29

## 2018-03-29 RX ORDER — BENAZEPRIL HYDROCHLORIDE 20 MG/1
TABLET ORAL
Qty: 90 TABLET | Refills: 0 | OUTPATIENT
Start: 2018-03-29

## 2018-03-29 NOTE — TELEPHONE ENCOUNTER
Last office visit 2-21-18 120/70  Last refill 3-27-18 #90 to [facility]. Patient called the pharmacy and was told they would have to request refill. Phone call to Toñito Vicente at [facility]. States it is filled and ready for .

## 2018-03-30 ENCOUNTER — NURSE ONLY (OUTPATIENT)
Dept: FAMILY MEDICINE CLINIC | Facility: CLINIC | Age: 58
End: 2018-03-30

## 2018-03-30 DIAGNOSIS — Z79.01 WARFARIN ANTICOAGULATION: ICD-10-CM

## 2018-03-30 DIAGNOSIS — Z86.711 HISTORY OF PULMONARY EMBOLUS (PE): Primary | ICD-10-CM

## 2018-03-30 LAB — INR: 2 (ref 0.8–1.2)

## 2018-03-30 PROCEDURE — 85610 PROTHROMBIN TIME: CPT | Performed by: FAMILY MEDICINE

## 2018-04-13 ENCOUNTER — NURSE ONLY (OUTPATIENT)
Dept: FAMILY MEDICINE CLINIC | Facility: CLINIC | Age: 58
End: 2018-04-13

## 2018-04-13 DIAGNOSIS — Z79.899 MEDICATION MANAGEMENT: Primary | ICD-10-CM

## 2018-04-13 DIAGNOSIS — Z79.01 WARFARIN ANTICOAGULATION: ICD-10-CM

## 2018-04-13 PROCEDURE — 85610 PROTHROMBIN TIME: CPT | Performed by: FAMILY MEDICINE

## 2018-04-23 RX ORDER — LORAZEPAM 0.5 MG/1
TABLET ORAL
Qty: 60 TABLET | Refills: 0 | Status: SHIPPED
Start: 2018-04-23 | End: 2018-06-14

## 2018-04-26 RX ORDER — ATENOLOL 25 MG/1
25 TABLET ORAL DAILY
Qty: 30 TABLET | Refills: 3 | Status: SHIPPED | OUTPATIENT
Start: 2018-04-26 | End: 2018-06-16

## 2018-04-26 NOTE — TELEPHONE ENCOUNTER
Patient confirms has been taking Atenolol 25 mg once daily for about 3 years now. This is only listed in our system as historical. Patient states that Dr. Micah Casillas had prescribed it last when she was still living in [place].   Also confirms still taking Kevon Lombard

## 2018-04-27 RX ORDER — CYCLOBENZAPRINE HCL 10 MG
TABLET ORAL
Qty: 90 TABLET | Refills: 0 | Status: SHIPPED | OUTPATIENT
Start: 2018-04-27 | End: 2018-08-21

## 2018-05-15 ENCOUNTER — ANTI-COAG VISIT (OUTPATIENT)
Dept: FAMILY MEDICINE CLINIC | Facility: CLINIC | Age: 58
End: 2018-05-15

## 2018-05-15 DIAGNOSIS — Z79.899 MEDICATION MANAGEMENT: Primary | ICD-10-CM

## 2018-05-15 DIAGNOSIS — Z79.01 WARFARIN ANTICOAGULATION: ICD-10-CM

## 2018-05-15 PROCEDURE — 85610 PROTHROMBIN TIME: CPT | Performed by: FAMILY MEDICINE

## 2018-05-15 RX ORDER — HYDROCODONE BITARTRATE AND ACETAMINOPHEN 5; 325 MG/1; MG/1
1 TABLET ORAL EVERY 6 HOURS PRN
Qty: 30 TABLET | Refills: 0 | Status: SHIPPED | OUTPATIENT
Start: 2018-05-15 | End: 2018-07-16

## 2018-06-14 RX ORDER — LORAZEPAM 0.5 MG/1
TABLET ORAL
Qty: 60 TABLET | Refills: 0 | OUTPATIENT
Start: 2018-06-14

## 2018-06-15 RX ORDER — LORAZEPAM 0.5 MG/1
TABLET ORAL
Qty: 60 TABLET | Refills: 0 | Status: SHIPPED
Start: 2018-06-15 | End: 2018-07-16

## 2018-06-18 RX ORDER — ATENOLOL 25 MG/1
TABLET ORAL
Qty: 90 TABLET | Refills: 2 | Status: SHIPPED | OUTPATIENT
Start: 2018-06-18 | End: 2018-08-03

## 2018-06-25 RX ORDER — WARFARIN SODIUM 10 MG/1
TABLET ORAL
Qty: 90 TABLET | Refills: 0 | Status: SHIPPED | OUTPATIENT
Start: 2018-06-25 | End: 2018-07-16

## 2018-06-25 NOTE — TELEPHONE ENCOUNTER
Last OV: 2/21/2018  Last INR: 5/15/86387  Last filled: 3/27/2018 #90 no RF    No future appointments.

## 2018-06-27 RX ORDER — WARFARIN SODIUM 10 MG/1
TABLET ORAL
Qty: 90 TABLET | Refills: 0 | Status: SHIPPED | OUTPATIENT
Start: 2018-06-27 | End: 2018-08-03

## 2018-06-27 RX ORDER — BENAZEPRIL HYDROCHLORIDE 20 MG/1
TABLET ORAL
Qty: 90 TABLET | Refills: 0 | Status: SHIPPED | OUTPATIENT
Start: 2018-06-27 | End: 2018-08-03

## 2018-07-16 ENCOUNTER — APPOINTMENT (OUTPATIENT)
Dept: LAB | Age: 58
End: 2018-07-16
Attending: FAMILY MEDICINE
Payer: MEDICARE

## 2018-07-16 ENCOUNTER — OFFICE VISIT (OUTPATIENT)
Dept: FAMILY MEDICINE CLINIC | Facility: CLINIC | Age: 58
End: 2018-07-16

## 2018-07-16 VITALS
OXYGEN SATURATION: 97 % | TEMPERATURE: 97 F | BODY MASS INDEX: 39.59 KG/M2 | SYSTOLIC BLOOD PRESSURE: 120 MMHG | HEART RATE: 85 BPM | WEIGHT: 264.25 LBS | DIASTOLIC BLOOD PRESSURE: 80 MMHG | HEIGHT: 68.5 IN

## 2018-07-16 DIAGNOSIS — Z79.01 LONG TERM (CURRENT) USE OF ANTICOAGULANTS: ICD-10-CM

## 2018-07-16 DIAGNOSIS — Z11.59 NEED FOR HEPATITIS C SCREENING TEST: ICD-10-CM

## 2018-07-16 DIAGNOSIS — M32.9 SYSTEMIC LUPUS ERYTHEMATOSUS, UNSPECIFIED SLE TYPE, UNSPECIFIED ORGAN INVOLVEMENT STATUS (HCC): ICD-10-CM

## 2018-07-16 DIAGNOSIS — Z00.00 ENCOUNTER FOR ANNUAL HEALTH EXAMINATION: ICD-10-CM

## 2018-07-16 DIAGNOSIS — J44.9 CHRONIC OBSTRUCTIVE PULMONARY DISEASE, UNSPECIFIED COPD TYPE (HCC): ICD-10-CM

## 2018-07-16 DIAGNOSIS — Z00.00 ENCOUNTER FOR ANNUAL HEALTH EXAMINATION: Primary | ICD-10-CM

## 2018-07-16 DIAGNOSIS — Z12.11 COLON CANCER SCREENING: ICD-10-CM

## 2018-07-16 DIAGNOSIS — Z79.52 CURRENT CHRONIC USE OF SYSTEMIC STEROIDS: ICD-10-CM

## 2018-07-16 DIAGNOSIS — E27.40 ADRENAL INSUFFICIENCY (HCC): ICD-10-CM

## 2018-07-16 DIAGNOSIS — I10 ESSENTIAL HYPERTENSION: ICD-10-CM

## 2018-07-16 LAB
ALBUMIN SERPL-MCNC: 3.4 G/DL (ref 3.5–4.8)
ALP LIVER SERPL-CCNC: 62 U/L (ref 46–118)
ALT SERPL-CCNC: 23 U/L (ref 14–54)
AST SERPL-CCNC: 16 U/L (ref 15–41)
BILIRUB SERPL-MCNC: 0.4 MG/DL (ref 0.1–2)
BUN BLD-MCNC: 16 MG/DL (ref 8–20)
CALCIUM BLD-MCNC: 9.1 MG/DL (ref 8.3–10.3)
CHLORIDE: 104 MMOL/L (ref 101–111)
CHOLEST SMN-MCNC: 194 MG/DL (ref ?–200)
CO2: 26 MMOL/L (ref 22–32)
CREAT BLD-MCNC: 1.18 MG/DL (ref 0.55–1.02)
DEPRECATED HBV CORE AB SER IA-ACNC: 24.2 NG/ML (ref 18–340)
EST. AVERAGE GLUCOSE BLD GHB EST-MCNC: 114 MG/DL (ref 68–126)
GLUCOSE BLD-MCNC: 103 MG/DL (ref 70–99)
HBA1C MFR BLD HPLC: 5.6 % (ref ?–5.7)
HDLC SERPL-MCNC: 53 MG/DL (ref 45–?)
HDLC SERPL: 3.66 {RATIO} (ref ?–4.44)
HEPATITIS C VIRUS AB INTERPRETATION: NONREACTIVE
INR: 2.8 (ref 0.8–1.2)
LDLC SERPL CALC-MCNC: 70 MG/DL (ref ?–130)
M PROTEIN MFR SERPL ELPH: 7.4 G/DL (ref 6.1–8.3)
NONHDLC SERPL-MCNC: 141 MG/DL (ref ?–130)
POTASSIUM SERPL-SCNC: 3.9 MMOL/L (ref 3.6–5.1)
SODIUM SERPL-SCNC: 140 MMOL/L (ref 136–144)
TRIGL SERPL-MCNC: 356 MG/DL (ref ?–150)
TSI SER-ACNC: 1.14 MIU/ML (ref 0.35–5.5)
VLDLC SERPL CALC-MCNC: 71 MG/DL (ref 5–40)

## 2018-07-16 PROCEDURE — 90670 PCV13 VACCINE IM: CPT | Performed by: FAMILY MEDICINE

## 2018-07-16 PROCEDURE — 83036 HEMOGLOBIN GLYCOSYLATED A1C: CPT

## 2018-07-16 PROCEDURE — 86803 HEPATITIS C AB TEST: CPT

## 2018-07-16 PROCEDURE — 36415 COLL VENOUS BLD VENIPUNCTURE: CPT

## 2018-07-16 PROCEDURE — 82728 ASSAY OF FERRITIN: CPT

## 2018-07-16 PROCEDURE — 80053 COMPREHEN METABOLIC PANEL: CPT

## 2018-07-16 PROCEDURE — G0009 ADMIN PNEUMOCOCCAL VACCINE: HCPCS | Performed by: FAMILY MEDICINE

## 2018-07-16 PROCEDURE — 85610 PROTHROMBIN TIME: CPT | Performed by: FAMILY MEDICINE

## 2018-07-16 PROCEDURE — G0438 PPPS, INITIAL VISIT: HCPCS | Performed by: FAMILY MEDICINE

## 2018-07-16 PROCEDURE — 84443 ASSAY THYROID STIM HORMONE: CPT

## 2018-07-16 PROCEDURE — 80061 LIPID PANEL: CPT

## 2018-07-16 PROCEDURE — 36415 COLL VENOUS BLD VENIPUNCTURE: CPT | Performed by: FAMILY MEDICINE

## 2018-07-16 RX ORDER — HYDROCODONE BITARTRATE AND ACETAMINOPHEN 5; 325 MG/1; MG/1
1 TABLET ORAL EVERY 6 HOURS PRN
Qty: 30 TABLET | Refills: 0 | Status: SHIPPED | OUTPATIENT
Start: 2018-07-16 | End: 2018-09-13

## 2018-07-16 RX ORDER — LORAZEPAM 0.5 MG/1
TABLET ORAL
Qty: 60 TABLET | Refills: 0 | Status: SHIPPED | OUTPATIENT
Start: 2018-07-16 | End: 2018-08-03

## 2018-07-16 RX ORDER — ATENOLOL 25 MG/1
TABLET ORAL
Qty: 90 TABLET | Refills: 2 | OUTPATIENT
Start: 2018-07-16

## 2018-07-16 NOTE — PROGRESS NOTES
HPI:   Lena Marlow is a 61year old female who presents for a Medicare Initial Annual Wellness visit (Once after 12 month Medicare anniversary) .     No acute complaints           Fall/Risk Assessment   She has been screened for Falls and is low day  5. Poor appetite or overeating: Nearly every day  6. Feeling bad about yourself - or that you are a failure or have let yourself or your family down: More than half the days  7.  Trouble concentrating on things, such as reading the newspaper or watchin Cholesterol Labs:     Lab Results  Component Value Date   CHOLEST 204 (H) 03/23/2017   HDL 66 03/23/2017   LDL 99 03/23/2017   TRIG 197 (H) 03/23/2017          Last Chemistry Labs:     Lab Results  Component Value Date   AST 14 (L) 03/23/2017   ALT 17 03/2 medical history of Adrenal insufficiency ([facility] Chronic low back pain; COPD (chronic obstructive pulmonary disease) ([facility] History of cervical dysplasia (1/19/2017); History of pulmonary embolus (PE); HTN (hypertension); JOHN (obstructive sleep apnea);  Posi PERRL, conjunctiva/corneas clear, EOM's intact both eyes   Ears:  Normal TM's and external ear canals, both ears   Nose: Nares normal, septum midline,mucosa normal, no drainage or sinus tenderness   Throat: Lips, mucosa, and tongue normal; teeth and gums n Future      PLAN SUMMARY:   Diagnoses and all orders for this visit:    Encounter for annual health examination  -     HEMOGLOBIN A1C; Future    Need for hepatitis C screening test  -     HCV ANTIBODY;  Future    Essential hypertension  -     COMP METABOLIC Annually   Lab Results  Component Value Date   A1C 5.5 03/23/2017    No flowsheet data found.     Fasting Blood Sugar (FSB)Annually   Glucose (mg/dL)   Date Value   03/23/2017 115 (H)   ----------       Cardiovascular Disease Screening     LDL Annually LDL VIII or IX concentrates   Clients of institutions for the mentally retarded   Persons who live in the same house as a HepB virus carrier   Homosexual men   Illicit injectable drug abusers     Tetanus Toxoid  Only covered with a cut with metal- TD and TDaP

## 2018-07-16 NOTE — PATIENT INSTRUCTIONS
Sheree Hernández's SCREENING SCHEDULE   Tests on this list are recommended by your physician but may not be covered, or covered at this frequency, by your insurer. Please check with your insurance carrier before scheduling to verify coverage.    Carmen Davalos Covered every 10 years- more often if abnormal Colonoscopy,10 Years due on 07/07/2021 Update Health Maintenance if applicable    Flex Sigmoidoscopy Screen  Covered every 5 years No results found for this or any previous visit. No flowsheet data found. orders found for this or any previous visit. Please get once after your 65th birthday    Hepatitis B for Moderate/High Risk       No orders found for this or any previous visit.  Medium/high risk factors:   End-stage renal disease   Hemophiliacs who receive HbgA1C    At Least  Annually for Diabetics HgbA1C (%)   Date Value   03/23/2017 5.5    No flowsheet data found.     Fasting Blood Sugar (FSB)   Patient must be diagnosed with one of the following:   • Hypertension   • Dyslipidemia   • Obesity (BMI ³30 kg/m2 Enema-   uncomfortable but covered  Covered but uncomfortable   Glaucoma Screening      Ophthalmology Visit   Covered annually for Diabetics, people with Glaucoma family history,   Americans over age 48   Americans over age 72 No flowsheet d retarded   Persons who live in the same house as a HepB virus carrier   Homosexual men   Illicit injectable drug abusers     Tetanus Toxoid- Only covered with a cut with metal- TD and TDaP Not covered by Medicare Part B) No orders found for this or any pre

## 2018-07-16 NOTE — PROGRESS NOTES
Kelly Hernandes is a 61year old female. Patient presents with: Other: Subsequent Annual Medicare Wellness Visit. [facility]      HPI:   ***    Current Outpatient Prescriptions on File Prior to Visit:  BENAZEPRIL HCL 20 MG Oral Tab TAKE 1 TABLET BY M puffs by mouth daily. Disp:  Rfl: 3     No current facility-administered medications on file prior to visit.       Past Medical History:   Diagnosis Date   • Adrenal insufficiency ([facility]     due to steroids   • Chronic low back pain    • COPD (chronic obstru Consults:  ORTHOPEDIC - EXTERNAL    HPI:   Colby Otto is a 61year old female who presents for a {Medicare Annual Wellness Description:3400}.     ***  Her last annual assessment has been over 1 year: Annual Physical due on 11/19/1962         Fall/ including the explanation and discussion of advance directives standard forms performed Face to Face with patient and Family/surrogate (if present), and forms available to patient in AVS\"}       She does NOT have a Power of  for Health Care on nila Results  Component Value Date   AST 14 (L) 03/23/2017   ALT 17 03/23/2017   CA 9.0 03/23/2017   ALB 3.8 03/23/2017   CREATSERUM 1.25 (H) 03/23/2017    (H) 03/23/2017        CBC  (most recent labs)     Lab Results  Component Value Date   WBC 10.3 03/ ([facility] Chronic low back pain; COPD (chronic obstructive pulmonary disease) ([facility] History of pulmonary embolus (PE); HTN (hypertension); JOHN (obstructive sleep apnea); Positive cardiolipin antibodies; and SLE (systemic lupus erythematosus) ([facility]     She  h review of chart, separate sheet to patient  [address] Internal Lab or Procedure External Lab or Procedure   Diabetes Screening      HbgA1C   Annually   Lab Results  Component Value Date   A1C 5.5 03/23/2017    No flows once after your 65th birthday    Hepatitis B for Moderate/High Risk No vaccine history found Medium/high risk factors:   End-stage renal disease   Hemophiliacs who received Factor VIII or IX concentrates   Clients of institutions for the mentally retarded

## 2018-07-23 ENCOUNTER — TELEPHONE (OUTPATIENT)
Dept: FAMILY MEDICINE CLINIC | Facility: CLINIC | Age: 58
End: 2018-07-23

## 2018-07-23 NOTE — TELEPHONE ENCOUNTER
Calling to advise that Dr Mazariegos [facility] office will handle the authorization if needed.

## 2018-08-01 ENCOUNTER — TELEPHONE (OUTPATIENT)
Dept: FAMILY MEDICINE CLINIC | Facility: CLINIC | Age: 58
End: 2018-08-01

## 2018-08-01 ENCOUNTER — HOSPITAL ENCOUNTER (OUTPATIENT)
Dept: BONE DENSITY | Age: 58
Discharge: HOME | End: 2018-08-01
Attending: FAMILY MEDICINE
Payer: MEDICARE

## 2018-08-01 DIAGNOSIS — Z79.52 CURRENT CHRONIC USE OF SYSTEMIC STEROIDS: ICD-10-CM

## 2018-08-01 PROCEDURE — 77080 DXA BONE DENSITY AXIAL: CPT | Performed by: FAMILY MEDICINE

## 2018-08-01 NOTE — TELEPHONE ENCOUNTER
Patient is requesting that the colonoscopy report that a Dr Harriett Crawford provided us be sent to Dr Marcell Garcia for her upcoming appointment.

## 2018-08-03 RX ORDER — ATENOLOL 25 MG/1
25 TABLET ORAL
Qty: 90 TABLET | Refills: 0 | Status: SHIPPED | OUTPATIENT
Start: 2018-08-03 | End: 2018-08-03

## 2018-08-03 RX ORDER — BENAZEPRIL HYDROCHLORIDE 20 MG/1
20 TABLET ORAL
Qty: 90 TABLET | Refills: 0 | Status: SHIPPED | OUTPATIENT
Start: 2018-08-03 | End: 2018-08-03

## 2018-08-03 RX ORDER — HYDROCHLOROTHIAZIDE 25 MG/1
TABLET ORAL
Qty: 135 TABLET | Refills: 0 | Status: SHIPPED | OUTPATIENT
Start: 2018-08-03 | End: 2019-04-11

## 2018-08-03 RX ORDER — ATENOLOL 25 MG/1
25 TABLET ORAL
Qty: 90 TABLET | Refills: 0 | Status: SHIPPED | OUTPATIENT
Start: 2018-08-03 | End: 2018-12-04

## 2018-08-03 RX ORDER — BENAZEPRIL HYDROCHLORIDE 20 MG/1
20 TABLET ORAL
Qty: 90 TABLET | Refills: 0 | Status: SHIPPED | OUTPATIENT
Start: 2018-08-03 | End: 2018-10-17

## 2018-08-03 RX ORDER — HYDROCHLOROTHIAZIDE 25 MG/1
TABLET ORAL
Qty: 135 TABLET | Refills: 0 | Status: SHIPPED | OUTPATIENT
Start: 2018-08-03 | End: 2018-08-03

## 2018-08-03 NOTE — TELEPHONE ENCOUNTER
Called patient to confirm that she is wanting to use [facility] as new pharmacy. bp meds refilled per protocol. Remaining refills cancelled at [facility] and spoke with Caroline Gamble.      Lorazepam Last refilled 7/16/18 #60/0 refills  Warfarin last refilled 6/

## 2018-08-06 RX ORDER — LORAZEPAM 0.5 MG/1
TABLET ORAL
Qty: 60 TABLET | Refills: 0 | Status: SHIPPED | OUTPATIENT
Start: 2018-08-06 | End: 2018-08-21

## 2018-08-06 RX ORDER — WARFARIN SODIUM 10 MG/1
TABLET ORAL
Qty: 90 TABLET | Refills: 0 | Status: SHIPPED | OUTPATIENT
Start: 2018-08-06 | End: 2018-08-30

## 2018-08-08 ENCOUNTER — MED REC SCAN ONLY (OUTPATIENT)
Dept: FAMILY MEDICINE CLINIC | Facility: CLINIC | Age: 58
End: 2018-08-08

## 2018-08-09 ENCOUNTER — OFFICE VISIT (OUTPATIENT)
Dept: FAMILY MEDICINE CLINIC | Facility: CLINIC | Age: 58
End: 2018-08-09
Payer: MEDICARE

## 2018-08-09 VITALS
WEIGHT: 263 LBS | OXYGEN SATURATION: 98 % | TEMPERATURE: 98 F | HEART RATE: 78 BPM | DIASTOLIC BLOOD PRESSURE: 80 MMHG | BODY MASS INDEX: 39 KG/M2 | SYSTOLIC BLOOD PRESSURE: 110 MMHG

## 2018-08-09 DIAGNOSIS — M85.88 OSTEOPENIA OF LUMBAR SPINE: Primary | ICD-10-CM

## 2018-08-09 PROCEDURE — 99213 OFFICE O/P EST LOW 20 MIN: CPT | Performed by: FAMILY MEDICINE

## 2018-08-09 NOTE — PROGRESS NOTES
Colby Otto is a 61year old female. Patient presents with: Other: fup on test....room 1      HPI:   Recent DEXA scan revealed osteopenia with a T score of -2.4 in the lumbar spine. Patient is on chronic steroids for her SLE.     Current Outpati to steroids   • Chronic low back pain    • COPD (chronic obstructive pulmonary disease) (HCC)     PFTs 8/2016, [facility]   • History of cervical dysplasia 1/19/2017   • History of pulmonary embolus (PE)     2014, right sided   • HTN (hypertension)    • JOHN T-Score:  -1.6      % young normals:  80      % age matched controls:  [de-identified]      Change from prior hip examination:  na               FEMORAL NECK ANALYSIS RESULTS:        Bone mineral density (BMD) (g/cm2):  0.703      Femoral neck T-Score:  -2.4      % you

## 2018-08-13 RX ORDER — ATENOLOL 25 MG/1
TABLET ORAL
Qty: 30 TABLET | Refills: 0 | OUTPATIENT
Start: 2018-08-13

## 2018-08-21 RX ORDER — CYCLOBENZAPRINE HCL 10 MG
TABLET ORAL
Qty: 90 TABLET | Refills: 0 | OUTPATIENT
Start: 2018-08-21

## 2018-08-21 RX ORDER — LORAZEPAM 0.5 MG/1
TABLET ORAL
Qty: 60 TABLET | Refills: 0 | Status: SHIPPED | OUTPATIENT
Start: 2018-08-21 | End: 2018-09-13

## 2018-08-21 RX ORDER — LORAZEPAM 0.5 MG/1
TABLET ORAL
Qty: 60 TABLET | Refills: 0 | OUTPATIENT
Start: 2018-08-21

## 2018-08-21 RX ORDER — CYCLOBENZAPRINE HCL 10 MG
TABLET ORAL
Qty: 90 TABLET | Refills: 0 | Status: SHIPPED | OUTPATIENT
Start: 2018-08-21 | End: 2018-10-22

## 2018-08-21 NOTE — TELEPHONE ENCOUNTER
LORazepam 0.5 MG Oral Tab   CYCLOBENZAPRINE HCL 10 MG Oral Tab   PLEASE SEND REFILL TO ALEXANDRA ARELLANO.

## 2018-08-30 ENCOUNTER — TELEPHONE (OUTPATIENT)
Dept: FAMILY MEDICINE CLINIC | Facility: CLINIC | Age: 58
End: 2018-08-30

## 2018-08-30 RX ORDER — WARFARIN SODIUM 10 MG/1
TABLET ORAL
Qty: 90 TABLET | Refills: 0 | Status: SHIPPED | OUTPATIENT
Start: 2018-08-30 | End: 2018-09-06

## 2018-08-30 NOTE — TELEPHONE ENCOUNTER
Fax request received from [facility] requesting clarification on Rx sent for Warfarin 10mg tablets. Sig states Take 1 tablet by mouth every night at bedtime with 1mg to total 11mg. Patient does not have Warfarin 1mg on file on their profile.     Phone call

## 2018-09-06 ENCOUNTER — NURSE ONLY (OUTPATIENT)
Dept: FAMILY MEDICINE CLINIC | Facility: CLINIC | Age: 58
End: 2018-09-06
Payer: MEDICARE

## 2018-09-06 DIAGNOSIS — Z79.01 WARFARIN ANTICOAGULATION: Primary | ICD-10-CM

## 2018-09-06 LAB — INR: 1.4 (ref 0.8–1.2)

## 2018-09-06 PROCEDURE — 85610 PROTHROMBIN TIME: CPT | Performed by: FAMILY MEDICINE

## 2018-09-06 RX ORDER — WARFARIN SODIUM 10 MG/1
10 TABLET ORAL NIGHTLY
Qty: 90 TABLET | Refills: 0 | COMMUNITY
Start: 2018-09-06 | End: 2018-11-21 | Stop reason: DRUGHIGH

## 2018-09-06 RX ORDER — WARFARIN SODIUM 2 MG/1
TABLET ORAL
Refills: 0 | COMMUNITY
Start: 2018-09-06 | End: 2018-09-13 | Stop reason: DRUGHIGH

## 2018-09-13 ENCOUNTER — OFFICE VISIT (OUTPATIENT)
Dept: FAMILY MEDICINE CLINIC | Facility: CLINIC | Age: 58
End: 2018-09-13
Payer: MEDICARE

## 2018-09-13 VITALS — WEIGHT: 265 LBS | DIASTOLIC BLOOD PRESSURE: 80 MMHG | BODY MASS INDEX: 40 KG/M2 | SYSTOLIC BLOOD PRESSURE: 130 MMHG

## 2018-09-13 DIAGNOSIS — M32.9 SYSTEMIC LUPUS ERYTHEMATOSUS, UNSPECIFIED SLE TYPE, UNSPECIFIED ORGAN INVOLVEMENT STATUS (HCC): ICD-10-CM

## 2018-09-13 DIAGNOSIS — J44.9 CHRONIC OBSTRUCTIVE PULMONARY DISEASE, UNSPECIFIED COPD TYPE (HCC): ICD-10-CM

## 2018-09-13 DIAGNOSIS — Z79.01 LONG TERM (CURRENT) USE OF ANTICOAGULANTS: Primary | ICD-10-CM

## 2018-09-13 DIAGNOSIS — I10 ESSENTIAL HYPERTENSION: ICD-10-CM

## 2018-09-13 DIAGNOSIS — Z79.899 MEDICATION MANAGEMENT: ICD-10-CM

## 2018-09-13 LAB — INR: 2.3 (ref 0.8–1.2)

## 2018-09-13 PROCEDURE — 85610 PROTHROMBIN TIME: CPT | Performed by: FAMILY MEDICINE

## 2018-09-13 PROCEDURE — 99214 OFFICE O/P EST MOD 30 MIN: CPT | Performed by: FAMILY MEDICINE

## 2018-09-13 RX ORDER — LORAZEPAM 0.5 MG/1
TABLET ORAL
Qty: 60 TABLET | Refills: 0 | Status: SHIPPED | OUTPATIENT
Start: 2018-09-13 | End: 2018-10-22

## 2018-09-13 RX ORDER — OMEPRAZOLE 40 MG/1
40 CAPSULE, DELAYED RELEASE ORAL DAILY
COMMUNITY
Start: 2018-09-04 | End: 2020-09-01 | Stop reason: ALTCHOICE

## 2018-09-13 RX ORDER — HYDROCODONE BITARTRATE AND ACETAMINOPHEN 5; 325 MG/1; MG/1
1 TABLET ORAL EVERY 6 HOURS PRN
Qty: 30 TABLET | Refills: 0 | Status: SHIPPED | OUTPATIENT
Start: 2018-09-13 | End: 2018-10-16

## 2018-09-13 NOTE — PROGRESS NOTES
Nia Weber is a 61year old female. Patient presents with:   Other: DISCUSS MEDICAL ISSUES FROM THE LAST COUPLE MONTHS, REFERALLS---- RM 1       HPI:   Patient was recently seen by her rheumatologist and her pulmonary specialist.  No significant MOUTH EVERY 8 HOURS AS NEEDED FOR ANXIETY Disp: 60 tablet Rfl: 0   [DISCONTINUED] HYDROcodone-acetaminophen 5-325 MG Oral Tab Take 1 tablet by mouth every 6 (six) hours as needed for Pain.  Disp: 30 tablet Rfl: 0   loratadine 10 MG Oral Tab Take 1 tablet (1 Pharynx normal  NECK: supple, no cervical adenopathy  LUNGS: clear to auscultation  CARDIO: RRR without murmur  ABD soft, nontender, normal BS, no masses, rebound or guarding. No organomegaly or CVA tenderness.   EXTREMITIES: no edema      Nurse Only on 09/ 07/16/2018 71* 5 - 40 mg/dL Final   • Chol/HDL Ratio 07/16/2018 3.66  <4.44 Final      Interpretation of CHOL/HDL ratios:      Male:          Female:          Risk:      3.43           3.27             One half average      4.97           4.44 • INR 07/16/2018 2.8* 0.8 - 1.2 Final    10 mg coumadin nightly   • Test Strip Lot # 07/16/2018 [id number]  Numeric Final   • Test Strip Expiration Date 07/16/2018 2019-09-30  Date Final           ASSESSMENT AND PLAN:     Long term (current) use of antic

## 2018-09-25 RX ORDER — ATENOLOL 25 MG/1
TABLET ORAL
Qty: 90 TABLET | Refills: 0 | OUTPATIENT
Start: 2018-09-25

## 2018-10-15 ENCOUNTER — TELEPHONE (OUTPATIENT)
Dept: FAMILY MEDICINE CLINIC | Facility: CLINIC | Age: 58
End: 2018-10-15

## 2018-10-15 NOTE — TELEPHONE ENCOUNTER
Calling the patient-     Future Appointments   Date Time Provider Meredith Jeter   10/16/2018 10:15 AM Earnestine Rutherford DO [facility] [place]

## 2018-10-15 NOTE — TELEPHONE ENCOUNTER
Calling the patient-   Saturday afternoon she started getting pains on her right side. She was thinking pleurisy? ?    Yesterday she started taking pain pills q4 hours and she increased her Prednisone to 10mg but she is still having pain     Stiff through th

## 2018-10-16 ENCOUNTER — OFFICE VISIT (OUTPATIENT)
Dept: FAMILY MEDICINE CLINIC | Facility: CLINIC | Age: 58
End: 2018-10-16
Payer: MEDICARE

## 2018-10-16 ENCOUNTER — HOSPITAL ENCOUNTER (OUTPATIENT)
Dept: GENERAL RADIOLOGY | Age: 58
Discharge: HOME | End: 2018-10-16
Attending: FAMILY MEDICINE
Payer: MEDICARE

## 2018-10-16 ENCOUNTER — TELEPHONE (OUTPATIENT)
Dept: FAMILY MEDICINE CLINIC | Facility: CLINIC | Age: 58
End: 2018-10-16

## 2018-10-16 VITALS
OXYGEN SATURATION: 99 % | TEMPERATURE: 97 F | DIASTOLIC BLOOD PRESSURE: 84 MMHG | WEIGHT: 260.5 LBS | BODY MASS INDEX: 39 KG/M2 | SYSTOLIC BLOOD PRESSURE: 138 MMHG | HEART RATE: 89 BPM

## 2018-10-16 DIAGNOSIS — I10 ESSENTIAL HYPERTENSION: Primary | ICD-10-CM

## 2018-10-16 DIAGNOSIS — R07.9 CHEST PAIN, UNSPECIFIED TYPE: Primary | ICD-10-CM

## 2018-10-16 DIAGNOSIS — R07.9 CHEST PAIN, UNSPECIFIED TYPE: ICD-10-CM

## 2018-10-16 PROCEDURE — 71046 X-RAY EXAM CHEST 2 VIEWS: CPT | Performed by: FAMILY MEDICINE

## 2018-10-16 PROCEDURE — 99214 OFFICE O/P EST MOD 30 MIN: CPT | Performed by: FAMILY MEDICINE

## 2018-10-16 NOTE — TELEPHONE ENCOUNTER
Calling to advise that the [facility] script is ready for .  She and her daughter will be here

## 2018-10-16 NOTE — TELEPHONE ENCOUNTER
Called [facility] and sent patient over for a STAT CTA to r/o PE    Faxed orders over   This is a STAT and hold.

## 2018-10-16 NOTE — PROGRESS NOTES
Tasia Caldwell is a 61year old female. Patient presents with: Other: severe pain on RT side chest to back--started on Saturday, taking 20mg prednisone daily, pain meds. ... [facility] room 1      HPI:   Patient complains of pain to the right side of her chest Disp:  Rfl: 3   SYMBICORT 80-4.5 MCG/ACT Inhalation Aerosol Take 2 puffs by mouth daily.  Disp:  Rfl: 3   Cyclobenzaprine HCl 10 MG Oral Tab TAKE 1 TABLET BY MOUTH THREE TIMES DAILY AS NEEDED FOR MUSCLE CRAMPS Disp: 90 tablet Rfl: 0     No current facility- clear to auscultation  CARDIO: RRR without murmur  ABD soft, nontender, normal BS, no masses, rebound or guarding. No organomegaly or CVA tenderness.   EXTREMITIES: no edema    Flory Alexandra MD 10/16/2018       Narrative     PROCEDURE: Sonmaisha Lopez CHEST PA +

## 2018-10-17 RX ORDER — BENAZEPRIL HYDROCHLORIDE 20 MG/1
TABLET ORAL
Qty: 90 TABLET | Refills: 1 | Status: SHIPPED | OUTPATIENT
Start: 2018-10-17 | End: 2019-05-28

## 2018-10-17 NOTE — TELEPHONE ENCOUNTER
Last office visit:  10/16/18  Last refill:  08/03/18   #90 no refills   Last cmp:  07/16/18  Last bp:  10/16/18   138/84      No future appointments.

## 2018-10-19 ENCOUNTER — TELEPHONE (OUTPATIENT)
Dept: FAMILY MEDICINE CLINIC | Facility: CLINIC | Age: 58
End: 2018-10-19

## 2018-10-19 NOTE — TELEPHONE ENCOUNTER
EXAM: CT angiogram of the chest with IV contrast    HISTORY: Chest pain radiating to the back    COMPARISON: No priors    TECHNIQUE: CTA of the chest was performed after intravenous administration of 100 mL Isovue 370.  Axial 1.45 and 5 mm slice thickness s nodular thickening.

## 2018-10-19 NOTE — TELEPHONE ENCOUNTER
Report is not scanned in the chart yet.  Once it is scanned I will forward to Clint Pelletier ([facility]) [phone]

## 2018-10-19 NOTE — TELEPHONE ENCOUNTER
PT WANTS TO GIVE AN UPDATE ON HER CONDITION FROM PLURACY. SHE SAYS HER PAIN IS TOLERABLE. PT ASKING IF DEVEN CAN  PLEASE SEND RESULTS FROM THIS WEEK'S TESTS TO PT PULMONOLOGIST? PT SAYS YOU HAVE ALL THE FAX/CONTACT INFORMATION.

## 2018-10-22 RX ORDER — LORAZEPAM 0.5 MG/1
TABLET ORAL
Qty: 60 TABLET | Refills: 0 | Status: SHIPPED | OUTPATIENT
Start: 2018-10-22 | End: 2018-11-21

## 2018-10-23 RX ORDER — CYCLOBENZAPRINE HCL 10 MG
TABLET ORAL
Qty: 90 TABLET | Refills: 0 | Status: SHIPPED | OUTPATIENT
Start: 2018-10-23 | End: 2018-12-19

## 2018-10-24 ENCOUNTER — TELEPHONE (OUTPATIENT)
Dept: FAMILY MEDICINE CLINIC | Facility: CLINIC | Age: 58
End: 2018-10-24

## 2018-10-24 DIAGNOSIS — J44.9 CHRONIC OBSTRUCTIVE PULMONARY DISEASE, UNSPECIFIED COPD TYPE (HCC): Primary | ICD-10-CM

## 2018-10-24 NOTE — TELEPHONE ENCOUNTER
Pt. Nate Graves a call from [place] she needed to come in for a CAT scan, she is confused? She already had one does she need another one? Also, she is not feeling much better.

## 2018-10-24 NOTE — TELEPHONE ENCOUNTER
Previous CT scan showed pulmonary nodule- per Dr Luh Rea she does not need to recheck for 6 months. She is still having diaphragm pain- but it is not \"spasming\". She is concerned that something was missed. It has been 12 days.    She is not sleepi

## 2018-10-25 NOTE — TELEPHONE ENCOUNTER
Nothing on the CT scan explains the pain. If she is continuing to have problems, recommend evaluation by pulmonology.

## 2018-10-25 NOTE — TELEPHONE ENCOUNTER
Requesting Rx for Tramadol and stopping the Vicodin. Tramadol gives her nightmares, but alleviates the pain. She will put up with the nightmares. Agnes Perdomo.

## 2018-10-25 NOTE — TELEPHONE ENCOUNTER
Mikeyousif Ana María calls back. Does not feel comfortable riding all the way to [facility] to see her current pulmonologist.  Information for Dr. Nanda Islas given.     Javier Mandel MD  Pulmonary Medicine   [address] [facility] [place], [address]  Phone: [phone]

## 2018-10-26 ENCOUNTER — TELEPHONE (OUTPATIENT)
Dept: FAMILY MEDICINE CLINIC | Facility: CLINIC | Age: 58
End: 2018-10-26

## 2018-10-26 NOTE — TELEPHONE ENCOUNTER
If getting worse, should go to [facility]  Possibly can be seen in [place] or by Dr Shivam Avalos partners

## 2018-10-30 ENCOUNTER — TELEPHONE (OUTPATIENT)
Dept: FAMILY MEDICINE CLINIC | Facility: CLINIC | Age: 58
End: 2018-10-30

## 2018-10-30 NOTE — TELEPHONE ENCOUNTER
Calling the patient-   Sunday she was in the ER. They did another CT scan and an US. She needs her gallbladder removed    She will find out who her gradmother/mom went to see and let me know.  She didn't have their name

## 2018-11-01 NOTE — TELEPHONE ENCOUNTER
Last OV 10/16/18  Last ordered 10/16/18 #30 Prior to that was ordered 9/13/18 #30  Left message for her to call back re;reason for the med, frequency of use .

## 2018-11-01 NOTE — TELEPHONE ENCOUNTER
HYDROcodone-acetaminophen 5-325 MG Oral Tab   PLEASE REFILL, CARMENCITA, OR OSCAR TRACEY OR JENNIFER TRACEY WILL  WHEN READY. SHE HAS ENOUGH TO GET THRU TONIGHT AND TOMORROW.

## 2018-11-02 RX ORDER — HYDROCODONE BITARTRATE AND ACETAMINOPHEN 5; 325 MG/1; MG/1
1 TABLET ORAL EVERY 6 HOURS PRN
Qty: 30 TABLET | Refills: 0 | Status: SHIPPED | OUTPATIENT
Start: 2018-11-02 | End: 2018-11-02

## 2018-11-02 RX ORDER — HYDROCODONE BITARTRATE AND ACETAMINOPHEN 5; 325 MG/1; MG/1
1 TABLET ORAL EVERY 6 HOURS PRN
Qty: 30 TABLET | Refills: 0 | Status: SHIPPED | OUTPATIENT
Start: 2018-11-02 | End: 2018-11-14

## 2018-11-02 NOTE — TELEPHONE ENCOUNTER
----- Message from Emanuel Rios sent at 11/2/2018  8:35 AM CDT -----  Destiney Leblanc RETURNED YOUR CALL     Regarding her prescription    . Julio Leblanc [phone] (home)

## 2018-11-02 NOTE — TELEPHONE ENCOUNTER
[facility] VERIF[facility] TODAY

## 2018-11-02 NOTE — TELEPHONE ENCOUNTER
Last fill 10/16/18 for the [address]. She normally takes for her back. Had a recent episode of chest pain (r/o PE) and then had an ER visit on 10/28/18 and she has to have her gallbladder removed for large gallstone.      Ok to fill until Dr Eldred Riedel is back o

## 2018-11-05 ENCOUNTER — TELEPHONE (OUTPATIENT)
Dept: FAMILY MEDICINE CLINIC | Facility: CLINIC | Age: 58
End: 2018-11-05

## 2018-11-05 NOTE — TELEPHONE ENCOUNTER
She needs clearance for surgery. She is hoping that Dr Charla Motley will write a letter to release her. I explained that he is out of the office until 11/12/18 and she will need to see him to medically clear her.      Future Appointments   Date Time Provider

## 2018-11-06 ENCOUNTER — TELEPHONE (OUTPATIENT)
Dept: FAMILY MEDICINE CLINIC | Facility: CLINIC | Age: 58
End: 2018-11-06

## 2018-11-06 NOTE — TELEPHONE ENCOUNTER
She wants to know if she needs to see all of her specialists as well or can she just get a letter from them? I explained that she will need to ask the surgeon what they require? She is scheduled to see Dr Chichi Quintero for pre-op clearance.  I did advise

## 2018-11-14 ENCOUNTER — LAB ENCOUNTER (OUTPATIENT)
Dept: LAB | Age: 58
End: 2018-11-14
Attending: FAMILY MEDICINE
Payer: MEDICARE

## 2018-11-14 ENCOUNTER — OFFICE VISIT (OUTPATIENT)
Dept: FAMILY MEDICINE CLINIC | Facility: CLINIC | Age: 58
End: 2018-11-14
Payer: MEDICARE

## 2018-11-14 VITALS
BODY MASS INDEX: 38.58 KG/M2 | SYSTOLIC BLOOD PRESSURE: 138 MMHG | HEIGHT: 68.5 IN | OXYGEN SATURATION: 99 % | DIASTOLIC BLOOD PRESSURE: 84 MMHG | WEIGHT: 257.5 LBS | HEART RATE: 72 BPM | TEMPERATURE: 98 F

## 2018-11-14 DIAGNOSIS — R10.10 PAIN OF UPPER ABDOMEN: ICD-10-CM

## 2018-11-14 DIAGNOSIS — I10 ESSENTIAL HYPERTENSION: ICD-10-CM

## 2018-11-14 DIAGNOSIS — M32.9 SYSTEMIC LUPUS ERYTHEMATOSUS, UNSPECIFIED SLE TYPE, UNSPECIFIED ORGAN INVOLVEMENT STATUS (HCC): ICD-10-CM

## 2018-11-14 DIAGNOSIS — I10 ESSENTIAL HYPERTENSION: Primary | ICD-10-CM

## 2018-11-14 DIAGNOSIS — E27.40 ADRENAL INSUFFICIENCY (HCC): ICD-10-CM

## 2018-11-14 DIAGNOSIS — R73.01 IFG (IMPAIRED FASTING GLUCOSE): ICD-10-CM

## 2018-11-14 DIAGNOSIS — K80.20 CALCULUS OF GALLBLADDER WITHOUT CHOLECYSTITIS WITHOUT OBSTRUCTION: ICD-10-CM

## 2018-11-14 PROCEDURE — 93000 ELECTROCARDIOGRAM COMPLETE: CPT | Performed by: FAMILY MEDICINE

## 2018-11-14 PROCEDURE — 85610 PROTHROMBIN TIME: CPT | Performed by: FAMILY MEDICINE

## 2018-11-14 PROCEDURE — 82150 ASSAY OF AMYLASE: CPT

## 2018-11-14 PROCEDURE — 99214 OFFICE O/P EST MOD 30 MIN: CPT | Performed by: FAMILY MEDICINE

## 2018-11-14 PROCEDURE — 83690 ASSAY OF LIPASE: CPT

## 2018-11-14 PROCEDURE — 80053 COMPREHEN METABOLIC PANEL: CPT

## 2018-11-14 PROCEDURE — 36415 COLL VENOUS BLD VENIPUNCTURE: CPT

## 2018-11-14 PROCEDURE — 83036 HEMOGLOBIN GLYCOSYLATED A1C: CPT

## 2018-11-14 PROCEDURE — 36415 COLL VENOUS BLD VENIPUNCTURE: CPT | Performed by: FAMILY MEDICINE

## 2018-11-14 PROCEDURE — 85025 COMPLETE CBC W/AUTO DIFF WBC: CPT

## 2018-11-14 RX ORDER — WARFARIN SODIUM 4 MG/1
4 TABLET ORAL DAILY
COMMUNITY
End: 2018-11-21

## 2018-11-14 RX ORDER — WARFARIN SODIUM 4 MG/1
8 TABLET ORAL DAILY
Qty: 60 TABLET | Refills: 0 | Status: SHIPPED | OUTPATIENT
Start: 2018-11-14 | End: 2018-11-14

## 2018-11-14 RX ORDER — WARFARIN SODIUM 4 MG/1
TABLET ORAL
Qty: 180 TABLET | Refills: 0 | Status: SHIPPED | OUTPATIENT
Start: 2018-11-14 | End: 2018-11-21

## 2018-11-14 RX ORDER — HYDROCODONE BITARTRATE AND ACETAMINOPHEN 5; 325 MG/1; MG/1
1 TABLET ORAL EVERY 6 HOURS PRN
Qty: 60 TABLET | Refills: 0 | Status: SHIPPED | OUTPATIENT
Start: 2018-11-14 | End: 2018-12-05

## 2018-11-14 NOTE — H&P
Imelda Brunner is a 61year old female who presents for a pre-operative physical exam.     Patient presents with:   Other: Pre op for Dr. Kevin Faust for Lap Cholecystectomy, possible open with a TBD date based on medical and pulmonary clearance-this surg Disp: 60 tablet Rfl: 0   loratadine 10 MG Oral Tab Take 1 tablet (10 mg total) by mouth daily. Disp: 90 tablet Rfl: 0   folic acid 1 MG Oral Tab Take 2 tablets (2 mg total) by mouth daily.  (Patient taking differently: Take 1 mg by mouth daily.  ) Disp: 60 on file    Social Needs      Financial resource strain: Not on file      Food insecurity - worry: Not on file      Food insecurity - inability: Not on file      Transportation needs - medical: Not on file      Transportation needs - non-medical: Not on nila who presents for a pre-operative physical exam.     Essential hypertension  (primary encounter diagnosis)  Calculus of gallbladder without cholecystitis without obstruction  Pain of upper abdomen  Adrenal insufficiency (hcc)  Systemic lupus erythematosus,

## 2018-11-15 DIAGNOSIS — R73.01 IMPAIRED FASTING GLUCOSE: ICD-10-CM

## 2018-11-15 DIAGNOSIS — I10 ESSENTIAL HYPERTENSION: Primary | ICD-10-CM

## 2018-11-15 DIAGNOSIS — E27.40 ADRENAL INSUFFICIENCY (HCC): ICD-10-CM

## 2018-11-21 ENCOUNTER — ANTI-COAG VISIT (OUTPATIENT)
Dept: FAMILY MEDICINE CLINIC | Facility: CLINIC | Age: 58
End: 2018-11-21
Payer: MEDICARE

## 2018-11-21 DIAGNOSIS — M32.9 SYSTEMIC LUPUS ERYTHEMATOSUS, UNSPECIFIED SLE TYPE, UNSPECIFIED ORGAN INVOLVEMENT STATUS (HCC): Primary | ICD-10-CM

## 2018-11-21 PROCEDURE — 85610 PROTHROMBIN TIME: CPT | Performed by: FAMILY MEDICINE

## 2018-11-21 RX ORDER — LORAZEPAM 0.5 MG/1
TABLET ORAL
Qty: 60 TABLET | Refills: 0 | Status: SHIPPED | OUTPATIENT
Start: 2018-11-21 | End: 2018-12-19

## 2018-11-23 ENCOUNTER — TELEPHONE (OUTPATIENT)
Dept: FAMILY MEDICINE CLINIC | Facility: CLINIC | Age: 58
End: 2018-11-23

## 2018-11-23 RX ORDER — LORAZEPAM 0.5 MG/1
TABLET ORAL
Qty: 60 TABLET | Refills: 0 | OUTPATIENT
Start: 2018-11-23

## 2018-11-23 NOTE — TELEPHONE ENCOUNTER
Calling Shaunna Wen to inform her we did receive letter from pulmonary. She has a call into rheumatology to get letter of clearance for surgery. She said if she wants labs done she will have orders faxed here.

## 2018-11-27 ENCOUNTER — OFFICE VISIT (OUTPATIENT)
Dept: SURGERY | Facility: CLINIC | Age: 58
End: 2018-11-27
Payer: MEDICARE

## 2018-11-27 ENCOUNTER — ANTI-COAG VISIT (OUTPATIENT)
Dept: FAMILY MEDICINE CLINIC | Facility: CLINIC | Age: 58
End: 2018-11-27
Payer: MEDICARE

## 2018-11-27 VITALS — HEIGHT: 68.5 IN | HEART RATE: 55 BPM | WEIGHT: 257 LBS | BODY MASS INDEX: 38.5 KG/M2 | TEMPERATURE: 98 F

## 2018-11-27 DIAGNOSIS — Z79.01 LONG TERM (CURRENT) USE OF ANTICOAGULANTS: ICD-10-CM

## 2018-11-27 DIAGNOSIS — K80.10 CALCULUS OF GALLBLADDER WITH CHOLECYSTITIS WITHOUT BILIARY OBSTRUCTION, UNSPECIFIED CHOLECYSTITIS ACUITY: Primary | ICD-10-CM

## 2018-11-27 DIAGNOSIS — M32.9 SYSTEMIC LUPUS ERYTHEMATOSUS, UNSPECIFIED SLE TYPE, UNSPECIFIED ORGAN INVOLVEMENT STATUS (HCC): Primary | ICD-10-CM

## 2018-11-27 PROBLEM — K81.1 CHRONIC CHOLECYSTITIS: Status: ACTIVE | Noted: 2018-11-27

## 2018-11-27 PROCEDURE — 85610 PROTHROMBIN TIME: CPT | Performed by: FAMILY MEDICINE

## 2018-11-27 PROCEDURE — 99214 OFFICE O/P EST MOD 30 MIN: CPT | Performed by: SURGERY

## 2018-11-27 NOTE — H&P
Luis Santoro is a 60year old female  Patient presents with:  Gallbladder: New patient referred by Dr. Juvencio Tang for gallbladder consult. Pt states she had US abd done at [facility] and has gallstones. c/o abdominal pain and back pain.         REFERRED BY every 6 (six) hours as needed for Pain (for severe pain only. Medication is addictive).  Disp: 60 tablet Rfl: 0   CYCLOBENZAPRINE HCL 10 MG Oral Tab TAKE 1 TABLET BY MOUTH THREE TIMES DAILY AS NEEDED FOR MUSCLE CRAMPS Disp: 90 tablet Rfl: 0   BENAZEPRIL HCL use: No      ROS     GENERAL HEALTH: otherwise feels well, no weight loss, no fever or chills  SKIN: denies any unusual skin rashes or jaundice  HEENT: denies nasal congestion, sinus pain or sore throat; hearing loss negative,   EYES , no diplopia or visio pain associated with bloating. I would recommend laparoscopic possible open cholecystectomy discussed with patient risks of surgery to include bleeding infection pneumonia DVT and injury to the common bile duct with a second operation to repair.   Patient

## 2018-11-30 RX ORDER — PREDNISONE 10 MG/1
10 TABLET ORAL DAILY
COMMUNITY
End: 2018-12-05

## 2018-12-04 RX ORDER — WARFARIN SODIUM 10 MG/1
10 TABLET ORAL NIGHTLY
Qty: 90 TABLET | Refills: 0 | Status: SHIPPED | OUTPATIENT
Start: 2018-12-04 | End: 2019-03-02

## 2018-12-04 RX ORDER — ATENOLOL 25 MG/1
25 TABLET ORAL
Qty: 90 TABLET | Refills: 0 | Status: SHIPPED | OUTPATIENT
Start: 2018-12-04 | End: 2019-03-02

## 2018-12-05 ENCOUNTER — LAB ENCOUNTER (OUTPATIENT)
Dept: LAB | Age: 58
End: 2018-12-05
Attending: FAMILY MEDICINE
Payer: MEDICARE

## 2018-12-05 ENCOUNTER — OFFICE VISIT (OUTPATIENT)
Dept: FAMILY MEDICINE CLINIC | Facility: CLINIC | Age: 58
End: 2018-12-05
Payer: MEDICARE

## 2018-12-05 VITALS
SYSTOLIC BLOOD PRESSURE: 136 MMHG | BODY MASS INDEX: 38.21 KG/M2 | WEIGHT: 255 LBS | DIASTOLIC BLOOD PRESSURE: 84 MMHG | HEART RATE: 69 BPM | TEMPERATURE: 98 F | OXYGEN SATURATION: 99 % | HEIGHT: 68.5 IN

## 2018-12-05 DIAGNOSIS — Z28.21 IMMUNIZATION NOT CARRIED OUT BECAUSE OF PATIENT REFUSAL: ICD-10-CM

## 2018-12-05 DIAGNOSIS — M32.9 SYSTEMIC LUPUS ERYTHEMATOSUS, UNSPECIFIED SLE TYPE, UNSPECIFIED ORGAN INVOLVEMENT STATUS (HCC): ICD-10-CM

## 2018-12-05 DIAGNOSIS — Z79.01 LONG TERM CURRENT USE OF ANTICOAGULANT: ICD-10-CM

## 2018-12-05 DIAGNOSIS — Z01.818 PRE-OP EXAM: ICD-10-CM

## 2018-12-05 DIAGNOSIS — Z01.818 PRE-OP EXAM: Primary | ICD-10-CM

## 2018-12-05 DIAGNOSIS — K80.20 CALCULUS OF GALLBLADDER WITHOUT CHOLECYSTITIS WITHOUT OBSTRUCTION: ICD-10-CM

## 2018-12-05 PROCEDURE — 99214 OFFICE O/P EST MOD 30 MIN: CPT | Performed by: FAMILY MEDICINE

## 2018-12-05 PROCEDURE — 85025 COMPLETE CBC W/AUTO DIFF WBC: CPT

## 2018-12-05 PROCEDURE — 85730 THROMBOPLASTIN TIME PARTIAL: CPT

## 2018-12-05 PROCEDURE — 36415 COLL VENOUS BLD VENIPUNCTURE: CPT | Performed by: FAMILY MEDICINE

## 2018-12-05 PROCEDURE — 36415 COLL VENOUS BLD VENIPUNCTURE: CPT

## 2018-12-05 PROCEDURE — 80053 COMPREHEN METABOLIC PANEL: CPT

## 2018-12-05 PROCEDURE — 81003 URINALYSIS AUTO W/O SCOPE: CPT

## 2018-12-05 PROCEDURE — 85610 PROTHROMBIN TIME: CPT | Performed by: FAMILY MEDICINE

## 2018-12-05 RX ORDER — HYDROCODONE BITARTRATE AND ACETAMINOPHEN 5; 325 MG/1; MG/1
1 TABLET ORAL EVERY 6 HOURS PRN
Qty: 60 TABLET | Refills: 0 | Status: ON HOLD | OUTPATIENT
Start: 2018-12-05 | End: 2018-12-10

## 2018-12-05 RX ORDER — PREDNISONE 10 MG/1
10 TABLET ORAL DAILY
Qty: 30 TABLET | Refills: 0 | Status: SHIPPED | OUTPATIENT
Start: 2018-12-05 | End: 2019-04-15

## 2018-12-05 NOTE — PROGRESS NOTES
Henrry Pandya is a 60year old female. Patient presents with: Other: Pre op for Dr. Norah Soares for laparocopic cholecystectomy with cholangiogram on 12/10/2018 at [facility] has had anesthesia before with no complications. ... [facility] room 1  Other: re Hydroxychloroquine Sulfate 200 MG Oral Tab Take 400 mg by mouth daily. Take 200mg tablet daily  Disp:  Rfl: 3   SYMBICORT 80-4.5 MCG/ACT Inhalation Aerosol Take 2 puffs by mouth daily.  Disp:  Rfl: 3   [DISCONTINUED] predniSONE 10 MG Oral Tab Take 10 mg b CARDIOVASCULAR: denies chest pain   GI: denies nausea, vomiting, diarrhea or abdominal pain   NEURO: denies headaches    EXAM:   /84   Pulse 69   Temp 97.7 °F (36.5 °C) (Tympanic)   Ht 68.5\"   Wt 255 lb   SpO2 99%   BMI 38.21 kg/m²   GENERAL: well [address]  Specimen #: [id number] Patient ID #:    [id number]   Procedure Date: 2018 /Age: 1960 (Age: 61) Gender:     F Accessioned: 2018 Address: [address] [place] [address]    [address] [facility]  [address] Reported: 2018   Encounter: [id number] arterial calcification. There is no significant pericardial fluid or pleural effusion. There is nonspecific bilateral lower lobe pleural thickening, right greater than left. There are also small scattered right lower lobe calcified pleural plaques. PT/INR (Prothrombin time)      CBC W Differential W Platelet [E]      Comp Metabolic Panel (14) [E]      PTT, Activated [E]      Urinalysis, Routine [E]      Influenza Vaccine Refused (Order that documents the process)      *Venipuncture      Meds & Refill

## 2018-12-10 ENCOUNTER — ANESTHESIA (OUTPATIENT)
Dept: SURGERY | Facility: HOSPITAL | Age: 58
End: 2018-12-10
Payer: MEDICARE

## 2018-12-10 ENCOUNTER — APPOINTMENT (OUTPATIENT)
Dept: GENERAL RADIOLOGY | Facility: HOSPITAL | Age: 58
End: 2018-12-10
Attending: SURGERY
Payer: MEDICARE

## 2018-12-10 ENCOUNTER — ANESTHESIA EVENT (OUTPATIENT)
Dept: SURGERY | Facility: HOSPITAL | Age: 58
End: 2018-12-10
Payer: MEDICARE

## 2018-12-10 ENCOUNTER — HOSPITAL ENCOUNTER (OUTPATIENT)
Facility: HOSPITAL | Age: 58
Setting detail: HOSPITAL OUTPATIENT SURGERY
Discharge: HOME | End: 2018-12-10
Attending: SURGERY | Admitting: SURGERY
Payer: MEDICARE

## 2018-12-10 VITALS
OXYGEN SATURATION: 100 % | DIASTOLIC BLOOD PRESSURE: 91 MMHG | SYSTOLIC BLOOD PRESSURE: 146 MMHG | WEIGHT: 255.75 LBS | BODY MASS INDEX: 38.32 KG/M2 | HEART RATE: 58 BPM | TEMPERATURE: 97 F | HEIGHT: 68.5 IN | RESPIRATION RATE: 16 BRPM

## 2018-12-10 DIAGNOSIS — K80.10 CALCULUS OF GALLBLADDER WITH CHOLECYSTITIS WITHOUT BILIARY OBSTRUCTION, UNSPECIFIED CHOLECYSTITIS ACUITY: ICD-10-CM

## 2018-12-10 PROCEDURE — 0FT44ZZ RESECTION OF GALLBLADDER, PERCUTANEOUS ENDOSCOPIC APPROACH: ICD-10-PCS | Performed by: SURGERY

## 2018-12-10 PROCEDURE — 74300 X-RAY BILE DUCTS/PANCREAS: CPT | Performed by: SURGERY

## 2018-12-10 PROCEDURE — BF141ZZ FLUOROSCOPY OF GALLBLADDER, BILE DUCTS AND PANCREATIC DUCTS USING LOW OSMOLAR CONTRAST: ICD-10-PCS | Performed by: SURGERY

## 2018-12-10 PROCEDURE — 47563 LAPARO CHOLECYSTECTOMY/GRAPH: CPT | Performed by: SURGERY

## 2018-12-10 RX ORDER — HYDROCODONE BITARTRATE AND ACETAMINOPHEN 5; 325 MG/1; MG/1
1 TABLET ORAL AS NEEDED
Status: COMPLETED | OUTPATIENT
Start: 2018-12-10 | End: 2018-12-10

## 2018-12-10 RX ORDER — MEPERIDINE HYDROCHLORIDE 25 MG/ML
12.5 INJECTION INTRAMUSCULAR; INTRAVENOUS; SUBCUTANEOUS AS NEEDED
Status: DISCONTINUED | OUTPATIENT
Start: 2018-12-10 | End: 2018-12-10

## 2018-12-10 RX ORDER — SODIUM CHLORIDE, SODIUM LACTATE, POTASSIUM CHLORIDE, CALCIUM CHLORIDE 600; 310; 30; 20 MG/100ML; MG/100ML; MG/100ML; MG/100ML
INJECTION, SOLUTION INTRAVENOUS CONTINUOUS
Status: DISCONTINUED | OUTPATIENT
Start: 2018-12-10 | End: 2018-12-10

## 2018-12-10 RX ORDER — HEPARIN SODIUM 5000 [USP'U]/ML
5000 INJECTION, SOLUTION INTRAVENOUS; SUBCUTANEOUS ONCE
Status: COMPLETED | OUTPATIENT
Start: 2018-12-10 | End: 2018-12-10

## 2018-12-10 RX ORDER — HYDROMORPHONE HYDROCHLORIDE 1 MG/ML
0.4 INJECTION, SOLUTION INTRAMUSCULAR; INTRAVENOUS; SUBCUTANEOUS EVERY 5 MIN PRN
Status: DISCONTINUED | OUTPATIENT
Start: 2018-12-10 | End: 2018-12-10

## 2018-12-10 RX ORDER — HYDROCODONE BITARTRATE AND ACETAMINOPHEN 5; 325 MG/1; MG/1
2 TABLET ORAL AS NEEDED
Status: COMPLETED | OUTPATIENT
Start: 2018-12-10 | End: 2018-12-10

## 2018-12-10 RX ORDER — NALOXONE HYDROCHLORIDE 0.4 MG/ML
80 INJECTION, SOLUTION INTRAMUSCULAR; INTRAVENOUS; SUBCUTANEOUS AS NEEDED
Status: DISCONTINUED | OUTPATIENT
Start: 2018-12-10 | End: 2018-12-10

## 2018-12-10 RX ORDER — HYDROCODONE BITARTRATE AND ACETAMINOPHEN 5; 325 MG/1; MG/1
1-2 TABLET ORAL EVERY 4 HOURS PRN
Qty: 30 TABLET | Refills: 0 | Status: SHIPPED | OUTPATIENT
Start: 2018-12-10 | End: 2019-01-21

## 2018-12-10 RX ORDER — LABETALOL HYDROCHLORIDE 5 MG/ML
10 INJECTION, SOLUTION INTRAVENOUS EVERY 10 MIN PRN
Status: DISCONTINUED | OUTPATIENT
Start: 2018-12-10 | End: 2018-12-10

## 2018-12-10 RX ORDER — ACETAMINOPHEN 500 MG
500 TABLET ORAL EVERY 6 HOURS PRN
COMMUNITY
End: 2020-09-01 | Stop reason: ALTCHOICE

## 2018-12-10 RX ORDER — ACETAMINOPHEN 500 MG
1000 TABLET ORAL ONCE
Status: DISCONTINUED | OUTPATIENT
Start: 2018-12-10 | End: 2018-12-10

## 2018-12-10 RX ORDER — MIDAZOLAM HYDROCHLORIDE 1 MG/ML
1 INJECTION INTRAMUSCULAR; INTRAVENOUS EVERY 5 MIN PRN
Status: DISCONTINUED | OUTPATIENT
Start: 2018-12-10 | End: 2018-12-10

## 2018-12-10 RX ORDER — METOCLOPRAMIDE HYDROCHLORIDE 5 MG/ML
10 INJECTION INTRAMUSCULAR; INTRAVENOUS AS NEEDED
Status: DISCONTINUED | OUTPATIENT
Start: 2018-12-10 | End: 2018-12-10

## 2018-12-10 RX ORDER — BUPIVACAINE HYDROCHLORIDE 5 MG/ML
INJECTION, SOLUTION EPIDURAL; INTRACAUDAL AS NEEDED
Status: DISCONTINUED | OUTPATIENT
Start: 2018-12-10 | End: 2018-12-10 | Stop reason: HOSPADM

## 2018-12-10 RX ORDER — ONDANSETRON 2 MG/ML
4 INJECTION INTRAMUSCULAR; INTRAVENOUS AS NEEDED
Status: DISCONTINUED | OUTPATIENT
Start: 2018-12-10 | End: 2018-12-10

## 2018-12-10 RX ORDER — DEXAMETHASONE SODIUM PHOSPHATE 4 MG/ML
8 VIAL (ML) INJECTION AS NEEDED
Status: DISCONTINUED | OUTPATIENT
Start: 2018-12-10 | End: 2018-12-10

## 2018-12-10 NOTE — ANESTHESIA PREPROCEDURE EVALUATION
PRE-OP EVALUATION    Patient Name: Malick Sharp    Pre-op Diagnosis: Calculus of gallbladder with cholecystitis without biliary obstruction, unspecified cholecystitis acuity [K80.10]    Procedure(s):  LAPAROSCOPIC CHOLECYSTECTOMY WITH CHOLANGIOGRAM    Surg Rfl: 0   [DISCONTINUED] atenolol 25 MG Oral Tab Take 1 tablet (25 mg total) by mouth once daily. Disp: 90 tablet Rfl: 0   Hydroxychloroquine Sulfate 200 MG Oral Tab Take 400 mg by mouth daily.  Take 200mg tablet daily  Disp:  Rfl: 3   SYMBICORT 80-4.5 MCG/A 12/05/2018     Lab Results   Component Value Date     12/05/2018    K 3.9 12/05/2018     12/05/2018    CO2 28.0 12/05/2018    BUN 14 12/05/2018    CREATSERUM 1.17 (H) 12/05/2018     (H) 12/05/2018    CA 9.2 12/05/2018     Lab Results   C

## 2018-12-10 NOTE — H&P
Gallbladder New patient referred by Dr. Sedrick Morrison for gallbladder consult.  Pt states she had US abd done at [facility] and has gallstones.  c/o abdominal pain and back pain.     Reason for Visit History   Addendum Note   Issosa Rains by: (systemic lupus erythematosus) (HCC)       sister and brother also have   • Tubular adenoma of colon 1/19/2017   • Warfarin anticoagulation 7/19/2017      History reviewed.  No pertinent surgical history.     Current Outpatient Medications on File Prior to current facility-administered medications on file prior to visit.    [de-identified]        Family History   Problem Relation Age of Onset   • Cancer Mother           lung   • Other (Other) Sister           SLE      Social History    Tobacco Use      Smoking status: cyanosis, clubbing or edema, no atrophy, full ROM     STUDIES:              Anti-coag visit on 11/27/2018   Component Date Value Ref Range Status   • INR 11/27/2018 1.8* 0.8 - 1.2 Final     9mg coumadin   • Test Strip Lot # 11/27/2018 [id number]  Numeric F

## 2018-12-10 NOTE — OPERATIVE REPORT
[facility]  Operative Note     Alyne Oppenheim Location: OR   CSN [id number] MRN [id number]   Admission Date 12/10/2018 Operation Date 12/10/2018   Attending Physician Lucia Manual, DO Operating Physician Aden Hedrick, DO      Preoperative Diagnosis: Ca taken to ensure that the duodenum was not involved in the dissection. The gallbladder was grasped at its fundus and retracted cephalad and medially. The  infundibulum was grasped and retracted laterally.   Blunt and sharp dissection was carried out within counts were correct.     Jose Damico DO  12/10/2018  1:00 PM

## 2018-12-10 NOTE — ANESTHESIA POSTPROCEDURE EVALUATION
[address]  Patient Status:  Hospital Outpatient Surgery   Age/Gender 60year old female MRN [id number]   Location [address] [facility] Attending Myrna Estrada, [address] [facility] Day # 0 PCP DO Whit Silva

## 2018-12-11 ENCOUNTER — TELEPHONE (OUTPATIENT)
Dept: FAMILY MEDICINE CLINIC | Facility: CLINIC | Age: 58
End: 2018-12-11

## 2018-12-11 RX ORDER — WARFARIN SODIUM 4 MG/1
TABLET ORAL
Qty: 60 TABLET | Refills: 0 | OUTPATIENT
Start: 2018-12-11

## 2018-12-11 NOTE — TELEPHONE ENCOUNTER
----- Message from Jordyn Panda sent at 12/11/2018  9:44 AM CST -----  Contact: PT  PT CALLING THE NURSE BACK, CALL HER @ [phone]

## 2018-12-11 NOTE — TELEPHONE ENCOUNTER
Called the patient and left detailed message     Future Appointments   Date Time Provider Meredith Jeter   12/17/2018 10:00 AM [facility] [place] NURSE [facility] [place]

## 2018-12-11 NOTE — TELEPHONE ENCOUNTER
She is not sure when she is supposed to restart her Warfarin. Surgery was yesterday so she is assuming that she should start taking tonight?    She was on 10mg QHS     I will confirm with Dr aBl Person and will also confirm if she should come back in 5 days

## 2018-12-11 NOTE — TELEPHONE ENCOUNTER
Last office visit: 12/05/18  Last refill:  11/21/18   #180, no refills        No future appointments. Calling Taisha Quinones to see if she needs the 4mg refilled right now. Left detailed message for her to call back.

## 2018-12-17 ENCOUNTER — ANTI-COAG VISIT (OUTPATIENT)
Dept: FAMILY MEDICINE CLINIC | Facility: CLINIC | Age: 58
End: 2018-12-17
Payer: MEDICARE

## 2018-12-17 DIAGNOSIS — Z79.01 WARFARIN ANTICOAGULATION: Primary | ICD-10-CM

## 2018-12-17 PROCEDURE — 85610 PROTHROMBIN TIME: CPT | Performed by: FAMILY MEDICINE

## 2018-12-18 ENCOUNTER — OFFICE VISIT (OUTPATIENT)
Dept: SURGERY | Facility: CLINIC | Age: 58
End: 2018-12-18

## 2018-12-18 VITALS
HEART RATE: 66 BPM | DIASTOLIC BLOOD PRESSURE: 72 MMHG | RESPIRATION RATE: 16 BRPM | WEIGHT: 255 LBS | SYSTOLIC BLOOD PRESSURE: 109 MMHG | HEIGHT: 68.5 IN | BODY MASS INDEX: 38.21 KG/M2 | TEMPERATURE: 98 F

## 2018-12-18 DIAGNOSIS — K81.1 CHRONIC CHOLECYSTITIS: Primary | ICD-10-CM

## 2018-12-18 DIAGNOSIS — Z79.01 WARFARIN ANTICOAGULATION: ICD-10-CM

## 2018-12-18 PROCEDURE — 99024 POSTOP FOLLOW-UP VISIT: CPT | Performed by: PHYSICIAN ASSISTANT

## 2018-12-18 NOTE — PROGRESS NOTES
Post Operative Visit Note       Active Problems  1. Chronic cholecystitis    2.  Warfarin anticoagulation         Chief Complaint   Patient presents with:  Post-Op: Post- Op 12/10/18 Calculus of gallbladder with cholecystitis without biliary obstruction - P Positive cardiolipin antibodies     Borderline positive, IgG   • SLE (systemic lupus erythematosus) (HCC)     sister and brother also have   • Tubular adenoma of colon 1/19/2017   • Visual impairment     glasses for reading   • Warfarin anticoagulation 7/1 0   CYCLOBENZAPRINE HCL 10 MG Oral Tab TAKE 1 TABLET BY MOUTH THREE TIMES DAILY AS NEEDED FOR MUSCLE CRAMPS Disp: 90 tablet Rfl: 0   BENAZEPRIL HCL 20 MG Oral Tab TAKE 1 TABLET BY MOUTH ONCE DAILY Disp: 90 tablet Rfl: 1   Omeprazole 40 MG Oral Capsule Amisha adenopathy. Does not bruise/bleed easily. Psychiatric/Behavioral: Negative for behavioral problems and sleep disturbance.        Physical Findings   /72 (BP Location: Right arm, Patient Position: Sitting, Cuff Size: large)   Pulse 66   Temp 97.8 °F gallbladder with gallstones. There were no other abnormalities noted. I have no further follow-up scheduled with this patient at this time. This patient can see me on an as-needed basis.  This patient should return urgently for any problems or complic

## 2018-12-19 ENCOUNTER — TELEPHONE (OUTPATIENT)
Dept: FAMILY MEDICINE CLINIC | Facility: CLINIC | Age: 58
End: 2018-12-19

## 2018-12-20 ENCOUNTER — ANTI-COAG VISIT (OUTPATIENT)
Dept: FAMILY MEDICINE CLINIC | Facility: CLINIC | Age: 58
End: 2018-12-20
Payer: MEDICARE

## 2018-12-20 DIAGNOSIS — Z79.01 WARFARIN ANTICOAGULATION: Primary | ICD-10-CM

## 2018-12-20 PROCEDURE — 85610 PROTHROMBIN TIME: CPT | Performed by: FAMILY MEDICINE

## 2018-12-20 RX ORDER — CYCLOBENZAPRINE HCL 10 MG
TABLET ORAL
Qty: 90 TABLET | Refills: 0 | Status: SHIPPED | OUTPATIENT
Start: 2018-12-20 | End: 2019-01-21

## 2018-12-20 RX ORDER — LORAZEPAM 0.5 MG/1
TABLET ORAL
Qty: 60 TABLET | Refills: 0 | Status: SHIPPED | OUTPATIENT
Start: 2018-12-20 | End: 2019-01-21

## 2018-12-20 NOTE — PATIENT INSTRUCTIONS
Dr. Zhao Christiansen instructs pt to continue same dose of coumadin (10mg daily) and repeat INR in one month.

## 2019-01-21 ENCOUNTER — TELEPHONE (OUTPATIENT)
Dept: FAMILY MEDICINE CLINIC | Facility: CLINIC | Age: 59
End: 2019-01-21

## 2019-01-21 RX ORDER — LORAZEPAM 0.5 MG/1
TABLET ORAL
Qty: 60 TABLET | Refills: 0 | Status: SHIPPED | OUTPATIENT
Start: 2019-01-21 | End: 2019-02-21

## 2019-01-21 RX ORDER — CYCLOBENZAPRINE HCL 10 MG
TABLET ORAL
Qty: 90 TABLET | Refills: 0 | Status: SHIPPED | OUTPATIENT
Start: 2019-01-21 | End: 2019-03-21

## 2019-01-21 RX ORDER — HYDROCODONE BITARTRATE AND ACETAMINOPHEN 5; 325 MG/1; MG/1
1-2 TABLET ORAL EVERY 4 HOURS PRN
Qty: 30 TABLET | Refills: 0 | Status: SHIPPED | OUTPATIENT
Start: 2019-01-21 | End: 2019-03-13

## 2019-01-21 NOTE — TELEPHONE ENCOUNTER
Please advise- do you want to see her first?   Called the patient to get more information-   No answer

## 2019-01-21 NOTE — TELEPHONE ENCOUNTER
OSCAR CARMENCITA'S SISTER CALLED AND SAYS CARMENCITA HAS A BOIL IN HER INNER THIGH AREA. SHE SAYS CARMENCITA USED SOME OF HER MUPIROCIN ANTI MICROBIAL OINTMENT IN THE PAST TO TREAT HER BOILS AND IT WORKED VERY WELL.  WOULD  [address]? PLEASE C

## 2019-01-21 NOTE — TELEPHONE ENCOUNTER
OSCAR, PT SISTER CALLED AGAIN TO SEE IF DR [facility] HAD A CHANCE TO SEE MESSAGE. SHE HAS TO COME TODAY TO  [address] SCRIPT FOR CARMENCITA AND WAS HOPING NOT TO MAKE TWO SEPARATE TRIPS TO PHARMACY.  PLEASE CALL

## 2019-01-21 NOTE — TELEPHONE ENCOUNTER
Okay to try the use use naproxen ointment 3 times a day and have her apply heat topically 3 times a day. If it does not improve, or gets worse with increased pain swelling redness will need to have the boil incised and drained.   If the boil is on the vagi

## 2019-01-21 NOTE — TELEPHONE ENCOUNTER
Lorazapam, cyclobenzaprine   grisel ARELLANO   (her sister Benito Griffin will be picking this one up)      Call when ready

## 2019-02-05 ENCOUNTER — MED REC SCAN ONLY (OUTPATIENT)
Dept: FAMILY MEDICINE CLINIC | Facility: CLINIC | Age: 59
End: 2019-02-05

## 2019-02-06 ENCOUNTER — TELEPHONE (OUTPATIENT)
Dept: FAMILY MEDICINE CLINIC | Facility: CLINIC | Age: 59
End: 2019-02-06

## 2019-02-06 NOTE — TELEPHONE ENCOUNTER
Pt is a couple weeks over due for INR-calling to schedule.     Future Appointments   Date Time Provider Meredith Jeter   2/7/2019 10:00 AM [facility] [place] NURSE [facility] [place]

## 2019-02-07 ENCOUNTER — ANTI-COAG VISIT (OUTPATIENT)
Dept: FAMILY MEDICINE CLINIC | Facility: CLINIC | Age: 59
End: 2019-02-07
Payer: MEDICARE

## 2019-02-07 DIAGNOSIS — Z79.01 WARFARIN ANTICOAGULATION: Primary | ICD-10-CM

## 2019-02-07 LAB — INR: 2.9 (ref 0.8–1.2)

## 2019-02-07 PROCEDURE — 85610 PROTHROMBIN TIME: CPT | Performed by: FAMILY MEDICINE

## 2019-02-21 RX ORDER — LORAZEPAM 0.5 MG/1
TABLET ORAL
Qty: 60 TABLET | Refills: 0 | Status: SHIPPED
Start: 2019-02-21 | End: 2019-03-22

## 2019-03-02 ENCOUNTER — OFFICE VISIT (OUTPATIENT)
Dept: FAMILY MEDICINE CLINIC | Facility: CLINIC | Age: 59
End: 2019-03-02
Payer: MEDICARE

## 2019-03-02 VITALS
OXYGEN SATURATION: 97 % | HEART RATE: 78 BPM | TEMPERATURE: 98 F | DIASTOLIC BLOOD PRESSURE: 80 MMHG | SYSTOLIC BLOOD PRESSURE: 120 MMHG

## 2019-03-02 DIAGNOSIS — M32.9 SYSTEMIC LUPUS ERYTHEMATOSUS, UNSPECIFIED SLE TYPE, UNSPECIFIED ORGAN INVOLVEMENT STATUS (HCC): ICD-10-CM

## 2019-03-02 DIAGNOSIS — J44.9 CHRONIC OBSTRUCTIVE PULMONARY DISEASE, UNSPECIFIED COPD TYPE (HCC): Primary | ICD-10-CM

## 2019-03-02 DIAGNOSIS — Z79.01 WARFARIN ANTICOAGULATION: ICD-10-CM

## 2019-03-02 LAB — INR: 2.6 (ref 0.8–1.2)

## 2019-03-02 PROCEDURE — 85610 PROTHROMBIN TIME: CPT | Performed by: FAMILY MEDICINE

## 2019-03-02 PROCEDURE — 99214 OFFICE O/P EST MOD 30 MIN: CPT | Performed by: FAMILY MEDICINE

## 2019-03-02 RX ORDER — PREDNISONE 20 MG/1
TABLET ORAL
Qty: 15 TABLET | Refills: 0 | Status: SHIPPED | OUTPATIENT
Start: 2019-03-02 | End: 2019-03-14

## 2019-03-02 RX ORDER — WARFARIN SODIUM 10 MG/1
TABLET ORAL
Qty: 90 TABLET | Refills: 0 | Status: SHIPPED | OUTPATIENT
Start: 2019-03-02 | End: 2019-05-28

## 2019-03-02 RX ORDER — ATENOLOL 25 MG/1
TABLET ORAL
Qty: 90 TABLET | Refills: 0 | Status: SHIPPED | OUTPATIENT
Start: 2019-03-02 | End: 2019-05-28

## 2019-03-02 RX ORDER — CEFPROZIL 250 MG/1
250 TABLET, FILM COATED ORAL 2 TIMES DAILY
Qty: 20 TABLET | Refills: 0 | Status: SHIPPED | OUTPATIENT
Start: 2019-03-02 | End: 2019-04-17 | Stop reason: ALTCHOICE

## 2019-03-02 NOTE — TELEPHONE ENCOUNTER
Last office visit:  12/05/18  Last cmp:  12/05/18  Last INR:  02/07/19  Last bp:  12/18/18   109/72    ATENOLOL:    WARFARIN 10:      Future Appointments   Date Time Provider Meredith Jeter   3/21/2019 10:00 AM [facility] [place] NURSE [facility] [place]

## 2019-03-02 NOTE — PROGRESS NOTES
Maddison Ramírez is a 60year old female. Patient presents with: Other: cough, congestion--has had it since approx December, beginning of Fabian Peaks has upped her prednisone to 10mg over the last two days. ...room 1      HPI:   Patient complains of cough, congest MG Oral Tab Take 2 tablets (2 mg total) by mouth daily. (Patient taking differently: Take 1 mg by mouth daily.  ) Disp: 60 tablet Rfl: 11   Cholecalciferol (VITAMIN D3) 5000 UNITS Oral Cap Take 1,000 Units by mouth.    Disp:  Rfl:    Hydroxychloroquine Sulf Used    Alcohol use:  Yes      Alcohol/week: 0.0 oz      Comment: social    Drug use: No       REVIEW OF SYSTEMS:   GENERAL HEALTH: feels well otherwise  SKIN: denies any unusual skin lesions or rashes  RESPIRATORY: denies shortness of breath   CARDIOVASCUL 1 tablet (250 mg total) by mouth 2 (two) times daily.    • predniSONE 20 MG Oral Tab 15 tablet 0     Si po bid x 5 days then 1 po daily x 5 days       Imaging & Consults:  None

## 2019-03-13 ENCOUNTER — TELEPHONE (OUTPATIENT)
Dept: FAMILY MEDICINE CLINIC | Facility: CLINIC | Age: 59
End: 2019-03-13

## 2019-03-13 RX ORDER — HYDROCODONE BITARTRATE AND ACETAMINOPHEN 5; 325 MG/1; MG/1
1-2 TABLET ORAL EVERY 4 HOURS PRN
Qty: 30 TABLET | Refills: 0 | Status: SHIPPED | OUTPATIENT
Start: 2019-03-13 | End: 2019-05-08

## 2019-03-13 NOTE — TELEPHONE ENCOUNTER
HYDROcodone-acetaminophen 5-325 MG Oral Tab     PT IS A WEEK EARLY FOR REFILL \"BUT IT'S BEEN A BAD MONTH\". CARMENCITA IS HOPING SHE CAN PICK IT UP TOMORROW.  PLEASE CALL AND CONFIRM WITH PT.

## 2019-03-14 ENCOUNTER — TELEPHONE (OUTPATIENT)
Dept: FAMILY MEDICINE CLINIC | Facility: CLINIC | Age: 59
End: 2019-03-14

## 2019-03-14 RX ORDER — PREDNISONE 20 MG/1
TABLET ORAL
Qty: 15 TABLET | Refills: 0 | Status: SHIPPED | OUTPATIENT
Start: 2019-03-14 | End: 2019-04-17 | Stop reason: ALTCHOICE

## 2019-03-21 RX ORDER — CYCLOBENZAPRINE HCL 10 MG
TABLET ORAL
Qty: 90 TABLET | Refills: 0 | Status: SHIPPED | OUTPATIENT
Start: 2019-03-21 | End: 2019-04-28

## 2019-03-21 NOTE — TELEPHONE ENCOUNTER
Cyclobenzaprine refill request.     Last office visit: 3/2/2019  Last refill: 1/21/2019, #90  Labs due: 5/15/2019    Future Appointments   Date Time Provider Meredith Jeter   3/28/2019 10:00 AM [facility] [place] NURSE [facility] [place]

## 2019-03-22 RX ORDER — LORAZEPAM 0.5 MG/1
TABLET ORAL
Qty: 60 TABLET | Refills: 0 | Status: SHIPPED | OUTPATIENT
Start: 2019-03-22 | End: 2019-04-05

## 2019-03-23 ENCOUNTER — TELEPHONE (OUTPATIENT)
Dept: FAMILY MEDICINE CLINIC | Facility: CLINIC | Age: 59
End: 2019-03-23

## 2019-03-23 NOTE — TELEPHONE ENCOUNTER
Lorazepam refill request.     Last office visit: 3/2/2019  Last refill: 3/22/2019, #60  Labs due: 5/15/2019    Future Appointments   Date Time Provider Meredith Jeter   3/28/2019 10:00 AM [facility] [place] NURSE [facility] [place]     Pt contacted and notifi

## 2019-03-28 ENCOUNTER — ANTI-COAG VISIT (OUTPATIENT)
Dept: FAMILY MEDICINE CLINIC | Facility: CLINIC | Age: 59
End: 2019-03-28
Payer: MEDICARE

## 2019-03-28 ENCOUNTER — MED REC SCAN ONLY (OUTPATIENT)
Dept: FAMILY MEDICINE CLINIC | Facility: CLINIC | Age: 59
End: 2019-03-28

## 2019-03-28 LAB — INR: 1.2 (ref 0.8–1.2)

## 2019-03-28 PROCEDURE — 85610 PROTHROMBIN TIME: CPT | Performed by: FAMILY MEDICINE

## 2019-03-28 NOTE — PROGRESS NOTES
1600: Left message for patient to return phone call in regards to test results. 1615: Called again, no answer. Was unable to get a hold of patient to ask when she had resumed her coumadin after her colonoscopy (3/25/2019).  Pt has not called back but

## 2019-03-29 ENCOUNTER — TELEPHONE (OUTPATIENT)
Dept: FAMILY MEDICINE CLINIC | Facility: CLINIC | Age: 59
End: 2019-03-29

## 2019-03-29 NOTE — TELEPHONE ENCOUNTER
INR is 1.2   Need to find out what dose she is taking and when she started it     She in on 10mg. She restarted it Monday night.      I will address with Dr Nadeem Rosenberg and call the patient back

## 2019-03-29 NOTE — TELEPHONE ENCOUNTER
Called the patient and advised     Future Appointments   Date Time Provider Meredith Jeter   4/4/2019  3:00 PM [facility] NURSE [facility] [place]   7/24/2019 11:00 AM Yuliya Gruber DO [facility] [place]

## 2019-03-29 NOTE — TELEPHONE ENCOUNTER
----- Message from Figueroa Cruz sent at 3/29/2019 10:31 AM CDT -----  Contact: Jonathan Anderson MISSED A CALL FOR HER INR TEST RESULT YESTERDAY, PLEASE CALL HER BACK.

## 2019-04-04 ENCOUNTER — ANTI-COAG VISIT (OUTPATIENT)
Dept: FAMILY MEDICINE CLINIC | Facility: CLINIC | Age: 59
End: 2019-04-04
Payer: MEDICARE

## 2019-04-04 PROCEDURE — 85610 PROTHROMBIN TIME: CPT | Performed by: FAMILY MEDICINE

## 2019-04-05 ENCOUNTER — MED REC SCAN ONLY (OUTPATIENT)
Dept: FAMILY MEDICINE CLINIC | Facility: CLINIC | Age: 59
End: 2019-04-05

## 2019-04-05 RX ORDER — LORAZEPAM 0.5 MG/1
0.5 TABLET ORAL EVERY 8 HOURS PRN
Qty: 30 TABLET | Refills: 0 | COMMUNITY
Start: 2019-04-05 | End: 2019-04-28

## 2019-04-11 RX ORDER — HYDROCHLOROTHIAZIDE 25 MG/1
TABLET ORAL
Qty: 135 TABLET | Refills: 0 | Status: SHIPPED | OUTPATIENT
Start: 2019-04-11 | End: 2019-06-11

## 2019-04-11 NOTE — TELEPHONE ENCOUNTER
Last office visit with PCP: 03/02/2019     Last CMP: 12/05/2018  Last B/P taken 3/02/2019:  120/80    Last refill: 8/03/2018  Requested Prescriptions     Pending Prescriptions Disp Refills   • HYDROCHLOROTHIAZIDE 25 MG Oral Tab [Pharmacy Med Name: [facility]

## 2019-04-15 ENCOUNTER — TELEPHONE (OUTPATIENT)
Dept: FAMILY MEDICINE CLINIC | Facility: CLINIC | Age: 59
End: 2019-04-15

## 2019-04-15 DIAGNOSIS — M32.9 SYSTEMIC LUPUS ERYTHEMATOSUS, UNSPECIFIED SLE TYPE, UNSPECIFIED ORGAN INVOLVEMENT STATUS (HCC): Primary | ICD-10-CM

## 2019-04-15 RX ORDER — PREDNISONE 10 MG/1
10 TABLET ORAL DAILY
Qty: 30 TABLET | Refills: 0 | Status: SHIPPED | OUTPATIENT
Start: 2019-04-15 | End: 2019-05-13

## 2019-04-15 RX ORDER — HYDROXYCHLOROQUINE SULFATE 200 MG/1
TABLET, FILM COATED ORAL
Qty: 180 TABLET | Refills: 0 | Status: SHIPPED | OUTPATIENT
Start: 2019-04-15 | End: 2019-05-13

## 2019-04-15 RX ORDER — HYDROXYCHLOROQUINE SULFATE 200 MG/1
400 TABLET, FILM COATED ORAL DAILY
Qty: 60 TABLET | Refills: 0 | Status: SHIPPED | OUTPATIENT
Start: 2019-04-15 | End: 2019-04-15

## 2019-04-15 RX ORDER — HYDROXYCHLOROQUINE SULFATE 200 MG/1
400 TABLET, FILM COATED ORAL DAILY
Qty: 60 TABLET | Refills: 0 | Status: SHIPPED | OUTPATIENT
Start: 2019-04-15 | End: 2019-04-17

## 2019-04-15 NOTE — TELEPHONE ENCOUNTER
predniSONE 10 MG Oral Tab   Hydroxychloroquine Sulfate 200 MG Oral Tab   WILL  REFILL THIS FOR HER? SHE IS OUT OF MEDICATION. PLEASE SEND REFILL TO [facility] [place].

## 2019-04-15 NOTE — TELEPHONE ENCOUNTER
I called the patient and she confirms that she is on 10mg Prednisone and hopefully soon she can get back down to the 5mg       Dr Huber Leiva from [facility] and is no longer working there (Rheumatology). He was filling her Hydroxychloroquine.  She wants to find a

## 2019-04-17 ENCOUNTER — OFFICE VISIT (OUTPATIENT)
Dept: FAMILY MEDICINE CLINIC | Facility: CLINIC | Age: 59
End: 2019-04-17
Payer: MEDICARE

## 2019-04-17 VITALS
TEMPERATURE: 98 F | SYSTOLIC BLOOD PRESSURE: 110 MMHG | BODY MASS INDEX: 41.2 KG/M2 | RESPIRATION RATE: 12 BRPM | HEIGHT: 68.5 IN | HEART RATE: 80 BPM | DIASTOLIC BLOOD PRESSURE: 68 MMHG | WEIGHT: 275 LBS

## 2019-04-17 DIAGNOSIS — M32.9 SYSTEMIC LUPUS ERYTHEMATOSUS, UNSPECIFIED SLE TYPE, UNSPECIFIED ORGAN INVOLVEMENT STATUS (HCC): ICD-10-CM

## 2019-04-17 DIAGNOSIS — Z79.01 WARFARIN ANTICOAGULATION: ICD-10-CM

## 2019-04-17 DIAGNOSIS — I10 ESSENTIAL HYPERTENSION: ICD-10-CM

## 2019-04-17 DIAGNOSIS — Z12.39 BREAST CANCER SCREENING: ICD-10-CM

## 2019-04-17 DIAGNOSIS — R63.5 WEIGHT GAIN: Primary | ICD-10-CM

## 2019-04-17 PROCEDURE — 85610 PROTHROMBIN TIME: CPT | Performed by: FAMILY MEDICINE

## 2019-04-17 PROCEDURE — 99214 OFFICE O/P EST MOD 30 MIN: CPT | Performed by: FAMILY MEDICINE

## 2019-04-17 PROCEDURE — 36415 COLL VENOUS BLD VENIPUNCTURE: CPT | Performed by: FAMILY MEDICINE

## 2019-04-17 PROCEDURE — 84443 ASSAY THYROID STIM HORMONE: CPT | Performed by: FAMILY MEDICINE

## 2019-04-17 NOTE — PROGRESS NOTES
Rom Cheema  .1is a 60year old female. Patient presents with: Other: discuss health concerns . inrm1      HPI:   She has gained about 15 pounds since her gallbladder surgery.   She thinks that some of it may be because she was afraid to eat prior to t ONCE DAILY Disp: 90 tablet Rfl: 1   Omeprazole 40 MG Oral Capsule Delayed Release Take 40 mg by mouth daily. Disp:  Rfl:    Mometasone Furoate 0.1 % External Cream Apply 1 Application topically 2 (two) times daily as needed.  Disp: 45 g Rfl: 0   loratadine (PE)     2014, right sided, at time was driving 8 hrs for work everyday    • HTN (hypertension)    • JOHN (obstructive sleep apnea)     Unable to tolerate CPAP   • Positive cardiolipin antibodies     Borderline positive, IgG   • SLE (systemic lupus erythema PROTHROMBIN TIME    Collection Time: 04/17/19  3:36 PM   Result Value Ref Range    INR 2.3 (A) 0.8 - 1.2    Test Strip Lot # [id number] Numeric    Test Strip Expiration Date 5/31/2020 Date           ASSESSMENT AND PLAN:     Weight gain  (primary encounte

## 2019-04-29 RX ORDER — LORAZEPAM 0.5 MG/1
TABLET ORAL
Qty: 60 TABLET | Refills: 0 | Status: SHIPPED | OUTPATIENT
Start: 2019-04-29 | End: 2019-05-29

## 2019-04-29 RX ORDER — CYCLOBENZAPRINE HCL 10 MG
TABLET ORAL
Qty: 90 TABLET | Refills: 0 | Status: SHIPPED | OUTPATIENT
Start: 2019-04-29 | End: 2019-06-11

## 2019-04-29 NOTE — TELEPHONE ENCOUNTER
Last refill on lorazepam #30 on 4/5/19  Last refill on cyclobenaprine #90 on 3/21/19  Future Appointments   Date Time Provider Meredith Jeter   6/4/2019  9:00 AM Schuyler Garcia MD [facility]   7/24/2019 11:00 AM Earnestine Rutherford DO [facility]

## 2019-05-08 ENCOUNTER — TELEPHONE (OUTPATIENT)
Dept: FAMILY MEDICINE CLINIC | Facility: CLINIC | Age: 59
End: 2019-05-08

## 2019-05-08 RX ORDER — HYDROCODONE BITARTRATE AND ACETAMINOPHEN 5; 325 MG/1; MG/1
1-2 TABLET ORAL EVERY 4 HOURS PRN
Qty: 30 TABLET | Refills: 0 | Status: SHIPPED | OUTPATIENT
Start: 2019-05-08 | End: 2019-07-02

## 2019-05-13 ENCOUNTER — MED REC SCAN ONLY (OUTPATIENT)
Dept: FAMILY MEDICINE CLINIC | Facility: CLINIC | Age: 59
End: 2019-05-13

## 2019-05-13 RX ORDER — PREDNISONE 10 MG/1
10 TABLET ORAL DAILY
Qty: 30 TABLET | Refills: 0 | Status: SHIPPED | OUTPATIENT
Start: 2019-05-13 | End: 2019-06-11

## 2019-05-13 RX ORDER — HYDROXYCHLOROQUINE SULFATE 200 MG/1
TABLET, FILM COATED ORAL
Qty: 60 TABLET | Refills: 0 | OUTPATIENT
Start: 2019-05-13

## 2019-05-13 RX ORDER — HYDROXYCHLOROQUINE SULFATE 200 MG/1
400 TABLET, FILM COATED ORAL
Qty: 180 TABLET | Refills: 0 | Status: SHIPPED | OUTPATIENT
Start: 2019-05-13 | End: 2019-08-08

## 2019-05-13 NOTE — TELEPHONE ENCOUNTER
HYDROCHLOROQUINE 200MG (APPT WITH RHEUMATOLOGY IN June)    PREDNISONE 10MG        CALL TO [facility] IN [facility]

## 2019-05-28 RX ORDER — WARFARIN SODIUM 10 MG/1
TABLET ORAL
Qty: 30 TABLET | Refills: 0 | Status: SHIPPED | OUTPATIENT
Start: 2019-05-28 | End: 2019-06-25

## 2019-05-28 RX ORDER — BENAZEPRIL HYDROCHLORIDE 20 MG/1
TABLET ORAL
Qty: 180 TABLET | Refills: 0 | Status: SHIPPED | OUTPATIENT
Start: 2019-05-28 | End: 2019-12-01

## 2019-05-28 RX ORDER — ATENOLOL 25 MG/1
TABLET ORAL
Qty: 90 TABLET | Refills: 0 | Status: SHIPPED | OUTPATIENT
Start: 2019-05-28 | End: 2019-08-24

## 2019-05-28 NOTE — TELEPHONE ENCOUNTER
Patient is due for INR. Left message for patient to return phone call.       Future Appointments   Date Time Provider Meredith Jeter   6/4/2019  9:00 AM Frida Vázquez MD [facility]   7/24/2019 11:00 AM Rico Conteh DO [facility]

## 2019-05-29 RX ORDER — LORAZEPAM 0.5 MG/1
TABLET ORAL
Qty: 60 TABLET | Refills: 0 | OUTPATIENT
Start: 2019-05-29

## 2019-05-29 RX ORDER — LORAZEPAM 0.5 MG/1
TABLET ORAL
Qty: 60 TABLET | Refills: 0 | Status: SHIPPED
Start: 2019-05-29 | End: 2019-07-02

## 2019-05-29 NOTE — TELEPHONE ENCOUNTER
Future Appointments   Date Time Provider Meredith Jeter   5/30/2019  3:00 PM [facility] NURSE [facility] [place]   6/4/2019  9:00 AM Mark Blunt MD [facility]   7/24/2019 11:00 AM Og Amanda DO [facility] [place]

## 2019-05-29 NOTE — TELEPHONE ENCOUNTER
Lorazepam refill request.     Last office visit: 4/17/2019  Last refill: 5/29/2019, #60    Future Appointments   Date Time Provider Meredith Jeter   5/30/2019  3:00 PM [facility] NURSE [facility] [place]   6/4/2019  9:00 AM Gely Rojas MD [facility]

## 2019-05-30 ENCOUNTER — TELEPHONE (OUTPATIENT)
Dept: FAMILY MEDICINE CLINIC | Facility: CLINIC | Age: 59
End: 2019-05-30

## 2019-05-30 RX ORDER — WARFARIN SODIUM 10 MG/1
TABLET ORAL
Qty: 90 TABLET | Refills: 0 | OUTPATIENT
Start: 2019-05-30

## 2019-05-30 NOTE — TELEPHONE ENCOUNTER
Contacted patient, schedule nurse appt for Saturday. States she can wait on refill for coumadin until then.

## 2019-06-01 ENCOUNTER — ANTI-COAG VISIT (OUTPATIENT)
Dept: FAMILY MEDICINE CLINIC | Facility: CLINIC | Age: 59
End: 2019-06-01
Payer: MEDICARE

## 2019-06-01 PROCEDURE — 85610 PROTHROMBIN TIME: CPT | Performed by: FAMILY MEDICINE

## 2019-06-04 PROCEDURE — 86038 ANTINUCLEAR ANTIBODIES: CPT | Performed by: INTERNAL MEDICINE

## 2019-06-04 PROCEDURE — 86160 COMPLEMENT ANTIGEN: CPT | Performed by: INTERNAL MEDICINE

## 2019-06-04 PROCEDURE — 86225 DNA ANTIBODY NATIVE: CPT | Performed by: INTERNAL MEDICINE

## 2019-06-05 ENCOUNTER — HOSPITAL ENCOUNTER (OUTPATIENT)
Dept: GENERAL RADIOLOGY | Age: 59
Discharge: HOME | End: 2019-06-05
Attending: INTERNAL MEDICINE
Payer: MEDICARE

## 2019-06-05 DIAGNOSIS — M32.9 SYSTEMIC LUPUS ERYTHEMATOSUS, UNSPECIFIED SLE TYPE, UNSPECIFIED ORGAN INVOLVEMENT STATUS (HCC): ICD-10-CM

## 2019-06-05 DIAGNOSIS — G89.29 CHRONIC BILATERAL LOW BACK PAIN WITHOUT SCIATICA: ICD-10-CM

## 2019-06-05 DIAGNOSIS — M25.561 CHRONIC PAIN OF BOTH KNEES: ICD-10-CM

## 2019-06-05 DIAGNOSIS — M54.50 CHRONIC BILATERAL LOW BACK PAIN WITHOUT SCIATICA: ICD-10-CM

## 2019-06-05 DIAGNOSIS — G89.29 CHRONIC PAIN OF BOTH KNEES: ICD-10-CM

## 2019-06-05 DIAGNOSIS — M25.551 PAIN OF BOTH HIP JOINTS: ICD-10-CM

## 2019-06-05 DIAGNOSIS — M25.562 CHRONIC PAIN OF BOTH KNEES: ICD-10-CM

## 2019-06-05 DIAGNOSIS — M25.552 PAIN OF BOTH HIP JOINTS: ICD-10-CM

## 2019-06-11 RX ORDER — HYDROCHLOROTHIAZIDE 25 MG/1
TABLET ORAL
Qty: 90 TABLET | Refills: 0 | Status: SHIPPED | OUTPATIENT
Start: 2019-06-11 | End: 2019-09-17

## 2019-06-11 RX ORDER — CYCLOBENZAPRINE HCL 10 MG
TABLET ORAL
Qty: 90 TABLET | Refills: 0 | Status: SHIPPED | OUTPATIENT
Start: 2019-06-11 | End: 2019-08-02

## 2019-06-11 RX ORDER — PREDNISONE 10 MG/1
TABLET ORAL
Qty: 30 TABLET | Refills: 0 | Status: SHIPPED | OUTPATIENT
Start: 2019-06-11 | End: 2019-08-02

## 2019-06-11 NOTE — TELEPHONE ENCOUNTER
Last office visit with PCP: 4/17/2019    Last CMP: 12/05/2018  Last B/P taken 6/4/2019: 114/80    Last refill for Hydrochlorothiazide: 4/11/2019  Last refill for Cyclobenzaprine: 4/29/2019  Last refill for Prednisone: 5/13/2019  Requested Prescriptions

## 2019-06-12 ENCOUNTER — HOSPITAL ENCOUNTER (OUTPATIENT)
Dept: GENERAL RADIOLOGY | Age: 59
Discharge: HOME | End: 2019-06-12
Attending: INTERNAL MEDICINE
Payer: MEDICARE

## 2019-06-12 PROCEDURE — 73565 X-RAY EXAM OF KNEES: CPT | Performed by: INTERNAL MEDICINE

## 2019-06-12 PROCEDURE — 72110 X-RAY EXAM L-2 SPINE 4/>VWS: CPT | Performed by: INTERNAL MEDICINE

## 2019-06-12 PROCEDURE — 73521 X-RAY EXAM HIPS BI 2 VIEWS: CPT | Performed by: INTERNAL MEDICINE

## 2019-06-25 RX ORDER — WARFARIN SODIUM 10 MG/1
TABLET ORAL
Qty: 30 TABLET | Refills: 0 | Status: SHIPPED | OUTPATIENT
Start: 2019-06-25 | End: 2019-07-30

## 2019-07-02 RX ORDER — HYDROCODONE BITARTRATE AND ACETAMINOPHEN 5; 325 MG/1; MG/1
1-2 TABLET ORAL EVERY 4 HOURS PRN
Qty: 30 TABLET | Refills: 0 | Status: SHIPPED | OUTPATIENT
Start: 2019-07-02 | End: 2019-09-04

## 2019-07-02 RX ORDER — LORAZEPAM 0.5 MG/1
TABLET ORAL
Qty: 60 TABLET | Refills: 0 | Status: SHIPPED | OUTPATIENT
Start: 2019-07-02 | End: 2019-08-02

## 2019-07-02 NOTE — TELEPHONE ENCOUNTER
REFILL LORAZEPAM TO [facility] [place], ALSO NEED HYDROCODONE SCRIPT, PT OR HER MOM-JENNIFER OR HER SISTER-OSCAR WILL  SCRIPT, PT ALSO ASKING IF THERE IS SOMETHING SIMILIAR SHE CAN BE PRESCRIBED THAT WOULD NOT REQUIRE HER TO HAVE TO COME  SCRIPT?

## 2019-07-02 NOTE — TELEPHONE ENCOUNTER
Tramadol can be faxed if that is an option.  Otherwise I will recommend lidocaine patch to the patient

## 2019-07-02 NOTE — TELEPHONE ENCOUNTER
She is sore everywhere. 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 She doesn't think that the patch will help because it's not just one area that hurts.    She would like to pick

## 2019-07-02 NOTE — TELEPHONE ENCOUNTER
She is allergic to nonsteroidals. She takes prednisone every day. She would have to come in to get a prescription for tramadol. She could try lidocaine patches in place of the hydrocodone.   I would love for her to be off of the hydrocodone as well espec

## 2019-07-24 ENCOUNTER — OFFICE VISIT (OUTPATIENT)
Dept: FAMILY MEDICINE CLINIC | Facility: CLINIC | Age: 59
End: 2019-07-24
Payer: MEDICARE

## 2019-07-24 VITALS
RESPIRATION RATE: 20 BRPM | HEIGHT: 68 IN | OXYGEN SATURATION: 96 % | SYSTOLIC BLOOD PRESSURE: 110 MMHG | BODY MASS INDEX: 41.89 KG/M2 | TEMPERATURE: 97 F | DIASTOLIC BLOOD PRESSURE: 76 MMHG | HEART RATE: 72 BPM | WEIGHT: 276.38 LBS

## 2019-07-24 DIAGNOSIS — Z79.01 WARFARIN ANTICOAGULATION: ICD-10-CM

## 2019-07-24 DIAGNOSIS — Z00.00 ENCOUNTER FOR ANNUAL HEALTH EXAMINATION: ICD-10-CM

## 2019-07-24 DIAGNOSIS — Z13.31 DEPRESSION SCREENING: ICD-10-CM

## 2019-07-24 DIAGNOSIS — I10 ESSENTIAL HYPERTENSION: Primary | ICD-10-CM

## 2019-07-24 DIAGNOSIS — E27.40 ADRENAL INSUFFICIENCY (HCC): ICD-10-CM

## 2019-07-24 DIAGNOSIS — Z12.39 BREAST CANCER SCREENING: ICD-10-CM

## 2019-07-24 DIAGNOSIS — J44.9 CHRONIC OBSTRUCTIVE PULMONARY DISEASE, UNSPECIFIED COPD TYPE (HCC): ICD-10-CM

## 2019-07-24 DIAGNOSIS — M32.9 SYSTEMIC LUPUS ERYTHEMATOSUS, UNSPECIFIED SLE TYPE, UNSPECIFIED ORGAN INVOLVEMENT STATUS (HCC): ICD-10-CM

## 2019-07-24 LAB — INR: 2.5 (ref 0.8–1.2)

## 2019-07-24 PROCEDURE — G0444 DEPRESSION SCREEN ANNUAL: HCPCS | Performed by: FAMILY MEDICINE

## 2019-07-24 PROCEDURE — 85610 PROTHROMBIN TIME: CPT | Performed by: FAMILY MEDICINE

## 2019-07-24 PROCEDURE — G0439 PPPS, SUBSEQ VISIT: HCPCS | Performed by: FAMILY MEDICINE

## 2019-07-24 NOTE — PATIENT INSTRUCTIONS
Leatha Hernández's SCREENING SCHEDULE   Tests on this list are recommended by your physician but may not be covered, or covered at this frequency, by your insurer. Please check with your insurance carrier before scheduling to verify coverage.    Noah Morales Covered every 10 years- more often if abnormal Colonoscopy due on 08/29/2028 Update Health Maintenance if applicable    Flex Sigmoidoscopy Screen  Covered every 5 years No results found for this or any previous visit. No flowsheet data found.      Fecal O Pneumococcal 23 (Pneumovax)  Covered Once after 65 No orders found for this or any previous visit. Please get once after your 65th birthday    Hepatitis B for Moderate/High Risk       No orders found for this or any previous visit.  Medium/high risk factors

## 2019-07-24 NOTE — PROGRESS NOTES
HPI:   Lyn Sesay is a 60year old female who presents for a Medicare Subsequent Annual Wellness visit (Pt already had Initial Annual Wellness).         Her last annual assessment has been over 1 year: Annual Physical due on 07/16/2019         F screening   Colby Otto was screened for Alcohol abuse and had a score of 0 so is at low risk.     Patient Care Team: Patient Care Team:  Ranulfo Jalloh DO as PCP - General (Family Practice)  Ranulfo Jalloh DO as PCP - [facility]    Patient Act past surgical history that includes tubal ligation. Her family history includes Cancer in her mother; Other in her sister. SOCIAL HISTORY:   She  reports that she has been smoking cigarettes. She has a 20.00 pack-year smoking history.  She has never us exercise. No follow-ups on file.      Jennifer Marquis DO, 7/24/2019     General Health            This section provided for quick review of chart, separate sheet to patient  [address] Internal Lab or Procedure Ext 07/16/2018 Please get once after your 65th birthday    Pneumococcal 23 (Pneumovax)  Covered Once after 65 No vaccine history found Please get once after your 65th birthday    Hepatitis B for Moderate/High Risk No vaccine history found Medium/high risk fact

## 2019-07-30 RX ORDER — WARFARIN SODIUM 10 MG/1
TABLET ORAL
Qty: 30 TABLET | Refills: 0 | Status: SHIPPED | OUTPATIENT
Start: 2019-07-30 | End: 2019-09-01

## 2019-08-02 ENCOUNTER — TELEPHONE (OUTPATIENT)
Dept: FAMILY MEDICINE CLINIC | Facility: CLINIC | Age: 59
End: 2019-08-02

## 2019-08-02 DIAGNOSIS — Z12.31 SCREENING MAMMOGRAM FOR HIGH-RISK PATIENT: Primary | ICD-10-CM

## 2019-08-02 RX ORDER — PREDNISONE 10 MG/1
TABLET ORAL
Qty: 30 TABLET | Refills: 0 | Status: SHIPPED | OUTPATIENT
Start: 2019-08-02 | End: 2019-09-17

## 2019-08-02 RX ORDER — CYCLOBENZAPRINE HCL 10 MG
TABLET ORAL
Qty: 90 TABLET | Refills: 0 | Status: SHIPPED | OUTPATIENT
Start: 2019-08-02 | End: 2019-10-21

## 2019-08-02 RX ORDER — LORAZEPAM 0.5 MG/1
TABLET ORAL
Qty: 60 TABLET | Refills: 0 | Status: SHIPPED | OUTPATIENT
Start: 2019-08-02 | End: 2019-09-03 | Stop reason: ALTCHOICE

## 2019-08-02 NOTE — TELEPHONE ENCOUNTER
States she last saw Dr. Wilkinson Necessary as rheumatology. He would only give her tylenol pm and apparently advised her to obtain her other medications from her PCP.

## 2019-08-02 NOTE — TELEPHONE ENCOUNTER
PREDNISONE 10 MG Oral Tab   CYCLOBENZAPRINE HCL 10 MG Oral Tab   LORazepam 0.5 MG Oral Tab   PLEASE SEND REFILLS TO [facility] IN [place].

## 2019-08-02 NOTE — TELEPHONE ENCOUNTER
Last OV 7/24/19  Last ordered:  Lorazepam 7/2/19 #60  Cyclobenzaprine 6/11/19 #90  Prednisone #30, ( takes 1/2 pill per day)  No future appointments scheduled.

## 2019-08-02 NOTE — TELEPHONE ENCOUNTER
Central scheduling rep calls, the dx code we submitted for her screening mammogram needs to be changed to Z12.31

## 2019-08-08 RX ORDER — HYDROXYCHLOROQUINE SULFATE 200 MG/1
TABLET, FILM COATED ORAL
Qty: 180 TABLET | Refills: 1 | Status: SHIPPED | OUTPATIENT
Start: 2019-08-08 | End: 2020-02-10

## 2019-08-14 ENCOUNTER — TELEPHONE (OUTPATIENT)
Dept: FAMILY MEDICINE CLINIC | Facility: CLINIC | Age: 59
End: 2019-08-14

## 2019-08-14 DIAGNOSIS — M79.7 FIBROMYALGIA: Primary | ICD-10-CM

## 2019-08-14 NOTE — TELEPHONE ENCOUNTER
[facility] NEEDS A REFERRAL FOR A NEW RHEUMATOLOGIST. SERENA OR JHONATHAN WITH [facility].   PLEASE CALL Tate Pritchett

## 2019-08-14 NOTE — TELEPHONE ENCOUNTER
Calling the patient - Dr Manny Ashby doesn't do fibromyalgia. He advised that Dr Annamarie Honeycutt or Dr Rosanna Delaney   She is asking which one Dr Carlyn Ruiz would prefer.  I will ask and call her back

## 2019-08-24 RX ORDER — ATENOLOL 25 MG/1
TABLET ORAL
Qty: 90 TABLET | Refills: 0 | Status: SHIPPED | OUTPATIENT
Start: 2019-08-24 | End: 2019-12-12

## 2019-08-24 NOTE — TELEPHONE ENCOUNTER
Last office visit: 7/24/19  Last refill: 5/28/19  Last labs: 12/5/18  Future Appointments   Date Time Provider Meredith Jeter   11/25/2019 11:00 AM MARY JANE POSADA RM1 Deja Kovacs

## 2019-08-26 ENCOUNTER — TELEPHONE (OUTPATIENT)
Dept: FAMILY MEDICINE CLINIC | Facility: CLINIC | Age: 59
End: 2019-08-26

## 2019-08-26 DIAGNOSIS — J44.9 CHRONIC OBSTRUCTIVE PULMONARY DISEASE, UNSPECIFIED COPD TYPE (HCC): Primary | ICD-10-CM

## 2019-08-26 NOTE — TELEPHONE ENCOUNTER
Calling the patient-   She wants a new pulmonologist - even if for a 2nd opinion to see what is going on with her lungs and these nodules. She currently sees Dr Jacob Stanley and several months ago had a couple of tests done and she has never gotten any results.

## 2019-09-03 ENCOUNTER — TELEPHONE (OUTPATIENT)
Dept: FAMILY MEDICINE CLINIC | Facility: CLINIC | Age: 59
End: 2019-09-03

## 2019-09-03 RX ORDER — WARFARIN SODIUM 10 MG/1
TABLET ORAL
Qty: 30 TABLET | Refills: 0 | Status: SHIPPED | OUTPATIENT
Start: 2019-09-03 | End: 2019-09-05

## 2019-09-03 NOTE — TELEPHONE ENCOUNTER
Patient is overdue for her INR   Future Appointments   Date Time Provider Meredith Jeter   61/8/3899 34:43 PM Zina Nagy MD [facility] Jari Cooks   11/25/2019 11:00 AM Jesse Serrano [facility] RM1 Gloria Harada     Calling the patient-   Future Appointments   Zachary

## 2019-09-03 NOTE — TELEPHONE ENCOUNTER
Last Office Visit: 7/24/19  Last Refill: 7/30/19  Last INR: 7/24/19 2.5, repeat in 1 month    Please call patient to set up nurse visit for INR

## 2019-09-03 NOTE — TELEPHONE ENCOUNTER
Karla Powell with [employer] or one of his partners [phone].    He has an office in [facility]           She needs [place] refill and will  on Thursday   Will print out Opioid agreement for her to sign

## 2019-09-03 NOTE — TELEPHONE ENCOUNTER
Nate 30 # SHE WAS GIVEN  [facility] PULMONOLOGIST IS A FAX #, CALL PT WITH CORRECT #

## 2019-09-04 RX ORDER — HYDROCODONE BITARTRATE AND ACETAMINOPHEN 5; 325 MG/1; MG/1
1-2 TABLET ORAL EVERY 4 HOURS PRN
Qty: 30 TABLET | Refills: 0 | Status: SHIPPED | OUTPATIENT
Start: 2019-09-04 | End: 2019-10-21

## 2019-09-04 RX ORDER — ALPRAZOLAM 0.5 MG/1
0.5 TABLET ORAL 3 TIMES DAILY PRN
Qty: 90 TABLET | Refills: 0 | Status: SHIPPED | OUTPATIENT
Start: 2019-09-04 | End: 2019-10-21

## 2019-09-05 ENCOUNTER — ANTI-COAG VISIT (OUTPATIENT)
Dept: FAMILY MEDICINE CLINIC | Facility: CLINIC | Age: 59
End: 2019-09-05
Payer: MEDICARE

## 2019-09-05 LAB — INR: 3.1 (ref 0.8–1.2)

## 2019-09-05 PROCEDURE — 85610 PROTHROMBIN TIME: CPT | Performed by: FAMILY MEDICINE

## 2019-09-05 RX ORDER — WARFARIN SODIUM 10 MG/1
9 TABLET ORAL
Qty: 30 TABLET | Refills: 0 | COMMUNITY
Start: 2019-09-05 | End: 2019-09-17 | Stop reason: DRUGHIGH

## 2019-09-05 NOTE — PROGRESS NOTES
Call placed to patient to see how she plans on taking her Wafarin 9mg dosage. Patient stated at office that she had extra 1mg tablets at home, however patient was previously taking 10mg tablets. Awaiting call back.

## 2019-09-05 NOTE — PROGRESS NOTES
Patient currently has an INR of 3.1. Patient takes 10mg PO Daily. Dr. Chhaya Walsh recommends patient change dose to 9mg PO Daily and recheck in 5-7 days. Patient notified and verbalizes understanding.  Dose changed added historically to medication list.     P

## 2019-09-06 NOTE — PROGRESS NOTES
Francisco Javier Allen called back and she does have enough to make 9mg QHS. She did take a 10mg last night.  She will take 9 mg starting tonight

## 2019-09-09 ENCOUNTER — TELEPHONE (OUTPATIENT)
Dept: FAMILY MEDICINE CLINIC | Facility: CLINIC | Age: 59
End: 2019-09-09

## 2019-09-09 NOTE — TELEPHONE ENCOUNTER
WANTS TO KNOW IF YOU HAVE THE [facility] AND REPORT ON HER LUNG SCAN FROM DR Stephanie Anguiano AT [address] [place] [address]

## 2019-09-09 NOTE — TELEPHONE ENCOUNTER
I advised that we would not have a disc   She asked if there was a way that we could do this? I advised that she will need to contact Dr Murl Sandhoff' office for this information.    She has an upcoming appointment with specialist and she has to have this info

## 2019-09-17 RX ORDER — HYDROCHLOROTHIAZIDE 25 MG/1
TABLET ORAL
Qty: 90 TABLET | Refills: 0 | Status: SHIPPED | OUTPATIENT
Start: 2019-09-17 | End: 2019-12-12

## 2019-09-17 RX ORDER — PREDNISONE 10 MG/1
TABLET ORAL
Qty: 30 TABLET | Refills: 0 | Status: CANCELLED | OUTPATIENT
Start: 2019-09-17

## 2019-09-17 RX ORDER — WARFARIN SODIUM 1 MG/1
TABLET ORAL
Refills: 0 | COMMUNITY
Start: 2019-09-17 | End: 2020-11-05 | Stop reason: DRUGHIGH

## 2019-09-17 RX ORDER — WARFARIN SODIUM 4 MG/1
TABLET ORAL
Qty: 60 TABLET | Refills: 0 | Status: SHIPPED | OUTPATIENT
Start: 2019-09-17 | End: 2019-09-17

## 2019-09-17 RX ORDER — WARFARIN SODIUM 4 MG/1
TABLET ORAL
Qty: 180 TABLET | Refills: 0 | Status: SHIPPED | OUTPATIENT
Start: 2019-09-17 | End: 2020-11-05 | Stop reason: DRUGHIGH

## 2019-09-17 RX ORDER — PREDNISONE 10 MG/1
TABLET ORAL
Qty: 30 TABLET | Refills: 0 | Status: SHIPPED | OUTPATIENT
Start: 2019-09-17 | End: 2019-10-21

## 2019-09-17 NOTE — TELEPHONE ENCOUNTER
Last refill: 6/11/19 #90 w/ 0 refills  Last OV: 7/24/19  Last labs: 12/5/18    Future Appointments   Date Time Provider Meredith Steffany   26/7/1647 88:04 PM Venecia Cordero MD [facility] LORIN Weinberg   11/25/2019 11:00 AM MARY JANE POSADA RM1 Jovi Silva

## 2019-09-30 ENCOUNTER — ANTI-COAG VISIT (OUTPATIENT)
Dept: FAMILY MEDICINE CLINIC | Facility: CLINIC | Age: 59
End: 2019-09-30
Payer: MEDICARE

## 2019-09-30 DIAGNOSIS — Z79.01 WARFARIN ANTICOAGULATION: Primary | ICD-10-CM

## 2019-09-30 LAB — INR: 2 (ref 0.8–1.2)

## 2019-09-30 PROCEDURE — 85610 PROTHROMBIN TIME: CPT | Performed by: FAMILY MEDICINE

## 2019-10-03 RX ORDER — WARFARIN SODIUM 10 MG/1
TABLET ORAL
Qty: 30 TABLET | Refills: 0 | Status: SHIPPED | OUTPATIENT
Start: 2019-10-03 | End: 2019-10-03

## 2019-10-03 RX ORDER — WARFARIN SODIUM 10 MG/1
TABLET ORAL
Qty: 30 TABLET | Refills: 0 | Status: SHIPPED | OUTPATIENT
Start: 2019-10-03 | End: 2020-06-11

## 2019-10-17 RX ORDER — WARFARIN SODIUM 4 MG/1
TABLET ORAL
Qty: 60 TABLET | Refills: 0 | Status: SHIPPED | OUTPATIENT
Start: 2019-10-17 | End: 2020-03-16

## 2019-10-17 NOTE — TELEPHONE ENCOUNTER
Last office visit: 7/24/19  Last refill: 9/17/19  Last PT/INR: 9/30/19  Future Appointments   Date Time Provider Meredith Jeter   31/6/0498 09:29 PM Jackelyn Neves MD [facility] [place] Argue   11/25/2019 11:00 AM MARY JANE POSADA RM1 Beni Da Silva

## 2019-10-21 RX ORDER — ALPRAZOLAM 0.5 MG/1
0.5 TABLET ORAL 3 TIMES DAILY PRN
Qty: 90 TABLET | Refills: 0 | Status: SHIPPED | OUTPATIENT
Start: 2019-10-21 | End: 2019-11-21

## 2019-10-21 RX ORDER — HYDROCODONE BITARTRATE AND ACETAMINOPHEN 5; 325 MG/1; MG/1
1-2 TABLET ORAL EVERY 4 HOURS PRN
Qty: 30 TABLET | Refills: 0 | Status: SHIPPED | OUTPATIENT
Start: 2019-10-21 | End: 2019-12-09

## 2019-10-21 RX ORDER — CYCLOBENZAPRINE HCL 10 MG
TABLET ORAL
Qty: 90 TABLET | Refills: 0 | Status: SHIPPED | OUTPATIENT
Start: 2019-10-21 | End: 2020-03-02

## 2019-10-21 RX ORDER — PREDNISONE 10 MG/1
TABLET ORAL
Qty: 30 TABLET | Refills: 0 | Status: SHIPPED | OUTPATIENT
Start: 2019-10-21 | End: 2019-12-12

## 2019-10-28 ENCOUNTER — MED REC SCAN ONLY (OUTPATIENT)
Dept: FAMILY MEDICINE CLINIC | Facility: CLINIC | Age: 59
End: 2019-10-28

## 2019-11-04 RX ORDER — WARFARIN SODIUM 10 MG/1
TABLET ORAL
Qty: 30 TABLET | Refills: 0 | Status: SHIPPED | OUTPATIENT
Start: 2019-11-04 | End: 2019-11-07

## 2019-11-05 ENCOUNTER — TELEPHONE (OUTPATIENT)
Dept: FAMILY MEDICINE CLINIC | Facility: CLINIC | Age: 59
End: 2019-11-05

## 2019-11-07 ENCOUNTER — TELEPHONE (OUTPATIENT)
Dept: FAMILY MEDICINE CLINIC | Facility: CLINIC | Age: 59
End: 2019-11-07

## 2019-11-07 DIAGNOSIS — Z86.711 HISTORY OF PULMONARY EMBOLUS (PE): ICD-10-CM

## 2019-11-07 DIAGNOSIS — Z79.01 WARFARIN ANTICOAGULATION: Primary | ICD-10-CM

## 2019-11-07 RX ORDER — WARFARIN SODIUM 10 MG/1
TABLET ORAL
Qty: 30 TABLET | Refills: 1 | Status: SHIPPED | OUTPATIENT
Start: 2019-11-07 | End: 2019-11-14

## 2019-11-07 NOTE — TELEPHONE ENCOUNTER
The order reads take 1/2 of a 10 mg tablet with a 4 mg to = 10 mg. Confirmed with patient, she is taking 9 mg qd. Order corrected and sent. Also reminded that she was due to have INR checked 10/30.  States she has an appointment to have labs drawn @ [facility] on

## 2019-11-14 ENCOUNTER — TELEPHONE (OUTPATIENT)
Dept: FAMILY MEDICINE CLINIC | Facility: CLINIC | Age: 59
End: 2019-11-14

## 2019-11-14 ENCOUNTER — ANTI-COAG VISIT (OUTPATIENT)
Dept: FAMILY MEDICINE CLINIC | Facility: CLINIC | Age: 59
End: 2019-11-14
Payer: MEDICARE

## 2019-11-14 PROCEDURE — 85610 PROTHROMBIN TIME: CPT | Performed by: NURSE PRACTITIONER

## 2019-11-14 RX ORDER — WARFARIN SODIUM 10 MG/1
TABLET ORAL
Qty: 30 TABLET | Refills: 1 | COMMUNITY
Start: 2019-11-14 | End: 2020-01-09

## 2019-11-21 NOTE — TELEPHONE ENCOUNTER
Last office visit: 7/24/19  Last refill: 10/21/19  Future Appointments   Date Time Provider Meredith Jeter   11/25/2019 11:00 AM MARY JANE POSADA RM1 Ankur Stoll   11/25/2019  3:00 PM EMG [facility] NURSE [facility] EMG [place]   3/64/0753 23:87 PM Destiny Givens

## 2019-11-22 RX ORDER — ALPRAZOLAM 0.5 MG/1
TABLET ORAL
Qty: 90 TABLET | Refills: 0 | Status: SHIPPED | OUTPATIENT
Start: 2019-11-22 | End: 2019-12-27

## 2019-11-25 ENCOUNTER — ANTI-COAG VISIT (OUTPATIENT)
Dept: FAMILY MEDICINE CLINIC | Facility: CLINIC | Age: 59
End: 2019-11-25
Payer: MEDICARE

## 2019-11-25 ENCOUNTER — HOSPITAL ENCOUNTER (OUTPATIENT)
Dept: MAMMOGRAPHY | Age: 59
Discharge: HOME | End: 2019-11-25
Attending: FAMILY MEDICINE
Payer: MEDICARE

## 2019-11-25 DIAGNOSIS — Z79.01 WARFARIN ANTICOAGULATION: Primary | ICD-10-CM

## 2019-11-25 DIAGNOSIS — Z12.31 SCREENING MAMMOGRAM FOR HIGH-RISK PATIENT: ICD-10-CM

## 2019-11-25 PROCEDURE — 77067 SCR MAMMO BI INCL CAD: CPT | Performed by: FAMILY MEDICINE

## 2019-11-25 PROCEDURE — 85610 PROTHROMBIN TIME: CPT | Performed by: FAMILY MEDICINE

## 2019-12-02 RX ORDER — BENAZEPRIL HYDROCHLORIDE 20 MG/1
TABLET ORAL
Qty: 180 TABLET | Refills: 0 | Status: SHIPPED | OUTPATIENT
Start: 2019-12-02 | End: 2020-05-28

## 2019-12-02 NOTE — TELEPHONE ENCOUNTER
Last refill #180 on 5/28/19  Last office visit on 7/24/19  Future Appointments   Date Time Provider Meredith Jeter   2/54/6356 32:42 PM Brittni Nagy MD [facility] Navin     BP Readings from Last 3 Encounters:  07/24/19 : 110/76  06/04/19 : 114/

## 2019-12-07 NOTE — TELEPHONE ENCOUNTER
Last refill #30 on 10/21/19  Last office visit on 7/24/19  Future Appointments   Date Time Provider Meredith Jeter   8/19/2087 55:57 PM Dante Boateng MD [facility] Randee Miles

## 2019-12-09 RX ORDER — HYDROCODONE BITARTRATE AND ACETAMINOPHEN 5; 325 MG/1; MG/1
1-2 TABLET ORAL EVERY 4 HOURS PRN
Qty: 30 TABLET | Refills: 0 | Status: SHIPPED | OUTPATIENT
Start: 2019-12-09 | End: 2020-01-17

## 2019-12-11 ENCOUNTER — MED REC SCAN ONLY (OUTPATIENT)
Dept: FAMILY MEDICINE CLINIC | Facility: CLINIC | Age: 59
End: 2019-12-11

## 2019-12-12 RX ORDER — HYDROCHLOROTHIAZIDE 25 MG/1
TABLET ORAL
Qty: 90 TABLET | Refills: 0 | Status: SHIPPED | OUTPATIENT
Start: 2019-12-12 | End: 2020-06-04

## 2019-12-12 RX ORDER — PREDNISONE 10 MG/1
TABLET ORAL
Qty: 30 TABLET | Refills: 0 | Status: SHIPPED | OUTPATIENT
Start: 2019-12-12 | End: 2020-01-20

## 2019-12-12 RX ORDER — ATENOLOL 25 MG/1
25 TABLET ORAL
Qty: 90 TABLET | Refills: 0 | Status: SHIPPED | OUTPATIENT
Start: 2019-12-12 | End: 2020-03-09

## 2019-12-12 NOTE — TELEPHONE ENCOUNTER
ATENOLOL 25 MG Oral Tab hydrochlorothiazide 25 MG   predniSONE 10 MG Oral Tab Oral Tab please call into [facility] in [place].   PT will be in town later on today and would like to pick them up then

## 2019-12-12 NOTE — TELEPHONE ENCOUNTER
Last office visit: 7/24/19  Last refill: Prednisone: 10/21/19, Hydrochlorothiazide: 9/17/19, Atenolol: 8/24/19  Future Appointments   Date Time Provider Meredith Jeter   4/92/3539 19:68 PM Judd Reddy MD [facility] Calista Schmid

## 2019-12-27 RX ORDER — ALPRAZOLAM 0.5 MG/1
TABLET ORAL
Qty: 90 TABLET | Refills: 0 | Status: SHIPPED | OUTPATIENT
Start: 2019-12-27 | End: 2020-01-28

## 2019-12-27 NOTE — TELEPHONE ENCOUNTER
Last office visit: 7/24/19  Last refill: 11/22/19  Future Appointments   Date Time Provider Meredith Jeter   3/00/3663 99:30 PM Jayashree Harper MD [facility] Melissa Ford

## 2019-12-31 ENCOUNTER — TELEPHONE (OUTPATIENT)
Dept: FAMILY MEDICINE CLINIC | Facility: CLINIC | Age: 59
End: 2019-12-31

## 2019-12-31 NOTE — TELEPHONE ENCOUNTER
Patient knows that she is due for her INR. I did advise that she needs to be seen regarding her chest congestion and the Prednisone.      Future Appointments   Date Time Provider Meredith Jeter   1/2/2020 10:30 AM Barbara Medrano MD [facility] [place]

## 2019-12-31 NOTE — TELEPHONE ENCOUNTER
PT. VERY CONGESTED AND ASKING FOR PREDNISONE TO BE CALLED IN. I INFORMED HER  [facility] IS OUT OF THE OFFICE AND SHE SAID THEY DO THIS FOR HER ALL THE TIME WITH HER PREDNISONE. I OFFERED APPT. SHE DECLINED.

## 2020-01-02 ENCOUNTER — OFFICE VISIT (OUTPATIENT)
Dept: FAMILY MEDICINE CLINIC | Facility: CLINIC | Age: 60
End: 2020-01-02
Payer: MEDICARE

## 2020-01-02 VITALS
SYSTOLIC BLOOD PRESSURE: 110 MMHG | TEMPERATURE: 97 F | WEIGHT: 281.19 LBS | DIASTOLIC BLOOD PRESSURE: 78 MMHG | BODY MASS INDEX: 42.62 KG/M2 | HEIGHT: 68 IN | RESPIRATION RATE: 18 BRPM | HEART RATE: 68 BPM

## 2020-01-02 DIAGNOSIS — J44.1 CHRONIC OBSTRUCTIVE PULMONARY DISEASE WITH ACUTE EXACERBATION (HCC): Primary | ICD-10-CM

## 2020-01-02 DIAGNOSIS — Z79.899 ENCOUNTER FOR LONG-TERM (CURRENT) USE OF OTHER MEDICATIONS: ICD-10-CM

## 2020-01-02 DIAGNOSIS — Z72.0 TOBACCO USER: ICD-10-CM

## 2020-01-02 DIAGNOSIS — Z79.01 WARFARIN ANTICOAGULATION: ICD-10-CM

## 2020-01-02 PROBLEM — Z86.010 HISTORY OF COLON POLYPS: Status: ACTIVE | Noted: 2018-04-09

## 2020-01-02 PROBLEM — D13.5 ADENOMYOMATOSIS OF GALLBLADDER: Status: ACTIVE | Noted: 2018-11-01

## 2020-01-02 PROBLEM — Z86.0100 HISTORY OF COLON POLYPS: Status: ACTIVE | Noted: 2018-04-09

## 2020-01-02 LAB — INR: 1.8 (ref 0.8–1.3)

## 2020-01-02 PROCEDURE — 99214 OFFICE O/P EST MOD 30 MIN: CPT | Performed by: FAMILY MEDICINE

## 2020-01-02 RX ORDER — TIOTROPIUM BROMIDE AND OLODATEROL 3.124; 2.736 UG/1; UG/1
SPRAY, METERED RESPIRATORY (INHALATION)
Refills: 3 | COMMUNITY
Start: 2019-12-09

## 2020-01-02 RX ORDER — PREDNISONE 10 MG/1
TABLET ORAL
Qty: 35 TABLET | Refills: 0 | Status: SHIPPED | OUTPATIENT
Start: 2020-01-02 | End: 2021-05-10 | Stop reason: ALTCHOICE

## 2020-01-02 RX ORDER — ALBUTEROL SULFATE 90 UG/1
2 AEROSOL, METERED RESPIRATORY (INHALATION) EVERY 6 HOURS PRN
Qty: 1 INHALER | Refills: 2 | Status: SHIPPED | OUTPATIENT
Start: 2020-01-02 | End: 2020-02-08

## 2020-01-02 RX ORDER — AMOXICILLIN AND CLAVULANATE POTASSIUM 875; 125 MG/1; MG/1
1 TABLET, FILM COATED ORAL 2 TIMES DAILY
Qty: 20 TABLET | Refills: 0 | Status: SHIPPED | OUTPATIENT
Start: 2020-01-02 | End: 2020-01-08

## 2020-01-02 RX ORDER — LORAZEPAM 0.5 MG/1
0.5 TABLET ORAL
COMMUNITY
End: 2020-02-07 | Stop reason: ALTCHOICE

## 2020-01-02 NOTE — PROGRESS NOTES
Patient presents with:  Cough: inrm 6    Chief Complaint Reviewed and Verified  Nursing Notes Reviewed and   Verified  Tobacco Reviewed  Allergies Reviewed  Medications Reviewed    Problem List Reviewed  Medical History Reviewed  Surgical History   Reviewe needed for Pain. 30 tablet 0   • BENAZEPRIL HCL 20 MG Oral Tab TAKE 1 TABLET BY MOUTH EVERY  tablet 0   • Warfarin Sodium 10 MG Oral Tab Take one tablet every evening except Monday and Friday.  On those days take 1/2 tab of a 10 mg tab with a 5 mg ta (obstructive sleep apnea)     Unable to tolerate CPAP   • Positive cardiolipin antibodies     Borderline positive, IgG   • SLE (systemic lupus erythematosus) (HCC)     sister and brother also have   • Tubular adenoma of colon 1/19/2017   • Visual impairmen murmur  LYMPH: min ant cerv adenopathy    Results for orders placed or performed in visit on 01/02/20   POCT COAGUCHECK    Collection Time: 01/02/20 11:07 AM   Result Value Ref Range    INR 1.8 (A) 0.8 - 1.3    INR Cartridge [id number]        ASSESSMENT AN

## 2020-01-08 ENCOUNTER — TELEPHONE (OUTPATIENT)
Dept: FAMILY MEDICINE CLINIC | Facility: CLINIC | Age: 60
End: 2020-01-08

## 2020-01-08 NOTE — TELEPHONE ENCOUNTER
SEEN BY DR. Kenisha Feldman LAST WEEK AND WAS PUT ON AN ANTIBIOTIC NOT FEELING BETTER SHE STATES THE ANTIBIOTIC  MESSING WITH HER HEAD AND MAKING HER FEEL WORSE. SHE IS GOING TO STOP TAKING THE ANTIBIOTIC, WANTS TO KNOW IF DR. Bozena Hart WANTS TO PUT HER ON A DI

## 2020-01-08 NOTE — TELEPHONE ENCOUNTER
I called the patient and advised that she will need to come in and see Dr Aurelio Lorenzana   Date Time Provider Meredith Jeter   1/9/2020  9:15 AM Rj Horan,  [facility] [place]   1/13/2020 11:00 AM EM[facility] [address]

## 2020-01-09 ENCOUNTER — OFFICE VISIT (OUTPATIENT)
Dept: FAMILY MEDICINE CLINIC | Facility: CLINIC | Age: 60
End: 2020-01-09
Payer: MEDICARE

## 2020-01-09 ENCOUNTER — APPOINTMENT (OUTPATIENT)
Dept: LAB | Age: 60
End: 2020-01-09
Attending: FAMILY MEDICINE
Payer: MEDICARE

## 2020-01-09 VITALS
WEIGHT: 281 LBS | HEIGHT: 68 IN | HEART RATE: 70 BPM | SYSTOLIC BLOOD PRESSURE: 120 MMHG | TEMPERATURE: 97 F | RESPIRATION RATE: 14 BRPM | BODY MASS INDEX: 42.59 KG/M2 | DIASTOLIC BLOOD PRESSURE: 82 MMHG

## 2020-01-09 DIAGNOSIS — Z72.0 TOBACCO USER: ICD-10-CM

## 2020-01-09 DIAGNOSIS — M32.9 SYSTEMIC LUPUS ERYTHEMATOSUS, UNSPECIFIED SLE TYPE, UNSPECIFIED ORGAN INVOLVEMENT STATUS (HCC): Primary | ICD-10-CM

## 2020-01-09 DIAGNOSIS — J44.1 CHRONIC OBSTRUCTIVE PULMONARY DISEASE WITH ACUTE EXACERBATION (HCC): ICD-10-CM

## 2020-01-09 DIAGNOSIS — M32.9 SYSTEMIC LUPUS ERYTHEMATOSUS, UNSPECIFIED SLE TYPE, UNSPECIFIED ORGAN INVOLVEMENT STATUS (HCC): ICD-10-CM

## 2020-01-09 DIAGNOSIS — I26.99 PULMONARY EMBOLISM, UNSPECIFIED CHRONICITY, UNSPECIFIED PULMONARY EMBOLISM TYPE, UNSPECIFIED WHETHER ACUTE COR PULMONALE PRESENT (HCC): ICD-10-CM

## 2020-01-09 DIAGNOSIS — N18.30 CKD (CHRONIC KIDNEY DISEASE) STAGE 3, GFR 30-59 ML/MIN (HCC): ICD-10-CM

## 2020-01-09 DIAGNOSIS — E27.40 ADRENAL INSUFFICIENCY (HCC): ICD-10-CM

## 2020-01-09 LAB
ALBUMIN SERPL-MCNC: 3.4 G/DL (ref 3.4–5)
ALBUMIN/GLOB SERPL: 1 {RATIO} (ref 1–2)
ALP LIVER SERPL-CCNC: 68 U/L (ref 46–118)
ALT SERPL-CCNC: 23 U/L (ref 13–56)
ANION GAP SERPL CALC-SCNC: 6 MMOL/L (ref 0–18)
AST SERPL-CCNC: 17 U/L (ref 15–37)
BILIRUB SERPL-MCNC: 0.4 MG/DL (ref 0.1–2)
BUN BLD-MCNC: 19 MG/DL (ref 7–18)
BUN/CREAT SERPL: 17 (ref 10–20)
CALCIUM BLD-MCNC: 8.8 MG/DL (ref 8.5–10.1)
CHLORIDE SERPL-SCNC: 106 MMOL/L (ref 98–112)
CO2 SERPL-SCNC: 28 MMOL/L (ref 21–32)
CREAT BLD-MCNC: 1.12 MG/DL (ref 0.55–1.02)
GLOBULIN PLAS-MCNC: 3.5 G/DL (ref 2.8–4.4)
GLUCOSE BLD-MCNC: 91 MG/DL (ref 70–99)
INR: 1.6 (ref 0.8–1.2)
M PROTEIN MFR SERPL ELPH: 6.9 G/DL (ref 6.4–8.2)
OSMOLALITY SERPL CALC.SUM OF ELEC: 292 MOSM/KG (ref 275–295)
PATIENT FASTING Y/N/NP: NO
POTASSIUM SERPL-SCNC: 4 MMOL/L (ref 3.5–5.1)
SODIUM SERPL-SCNC: 140 MMOL/L (ref 136–145)

## 2020-01-09 PROCEDURE — 80053 COMPREHEN METABOLIC PANEL: CPT

## 2020-01-09 PROCEDURE — 36415 COLL VENOUS BLD VENIPUNCTURE: CPT

## 2020-01-09 PROCEDURE — 99214 OFFICE O/P EST MOD 30 MIN: CPT | Performed by: FAMILY MEDICINE

## 2020-01-09 PROCEDURE — 85610 PROTHROMBIN TIME: CPT | Performed by: FAMILY MEDICINE

## 2020-01-09 RX ORDER — WARFARIN SODIUM 10 MG/1
TABLET ORAL
Qty: 30 TABLET | Refills: 1 | COMMUNITY
Start: 2020-01-09 | End: 2020-03-16

## 2020-01-09 NOTE — PROGRESS NOTES
Herrera Contreras is a 60year old female. Patient presents with:  Cough: inrm 2      HPI:   Armand Nava was here on January 2 with an acute exacerbation of COPD.   She was given a tapering dose of prednisone, told to continue her albuterol every 4-6 hours and also gi nightly, Disp: 30 tablet, Rfl: 0  Warfarin Sodium 1 MG Oral Tab, Take 1 PO with 8mg=9mg QHS, Disp: , Rfl: 0  WARFARIN SODIUM 4 MG Oral Tab, TAKE 2 TABLETS BY MOUTH DAILY, Disp: 180 tablet, Rfl: 0  HYDROXYCHLOROQUINE SULFATE 200 MG Oral Tab, TAKE 2 TABLETS erythematosus) [facility])     sister and brother also have   • Tubular adenoma of colon 1/19/2017   • Visual impairment     glasses for reading   • Warfarin anticoagulation 7/19/2017     Past Surgical History:   Procedure Laterality Date   • CHOLECYSTECTOMY Strip Lot # [id number] Numeric    Test Strip Expiration Date 09/30/2020 Date           ASSESSMENT AND PLAN:     Systemic lupus erythematosus, unspecified sle type, unspecified organ involvement status (hcc)  (primary encounter diagnosis)  Pulmonary embolis

## 2020-01-10 DIAGNOSIS — Z79.01 WARFARIN ANTICOAGULATION: Primary | ICD-10-CM

## 2020-01-10 DIAGNOSIS — N18.30 CKD (CHRONIC KIDNEY DISEASE) STAGE 3, GFR 30-59 ML/MIN (HCC): ICD-10-CM

## 2020-01-15 ENCOUNTER — OFFICE VISIT (OUTPATIENT)
Dept: RHEUMATOLOGY | Facility: CLINIC | Age: 60
End: 2020-01-15
Payer: MEDICARE

## 2020-01-15 ENCOUNTER — ANTI-COAG VISIT (OUTPATIENT)
Dept: FAMILY MEDICINE CLINIC | Facility: CLINIC | Age: 60
End: 2020-01-15
Payer: MEDICARE

## 2020-01-15 ENCOUNTER — LAB ENCOUNTER (OUTPATIENT)
Dept: LAB | Age: 60
End: 2020-01-15
Attending: FAMILY MEDICINE
Payer: MEDICARE

## 2020-01-15 VITALS
BODY MASS INDEX: 43.65 KG/M2 | WEIGHT: 288 LBS | RESPIRATION RATE: 20 BRPM | DIASTOLIC BLOOD PRESSURE: 84 MMHG | HEART RATE: 78 BPM | HEIGHT: 68 IN | SYSTOLIC BLOOD PRESSURE: 120 MMHG

## 2020-01-15 DIAGNOSIS — M17.0 PRIMARY OSTEOARTHRITIS OF BOTH KNEES: ICD-10-CM

## 2020-01-15 DIAGNOSIS — M79.7 FIBROMYALGIA: ICD-10-CM

## 2020-01-15 DIAGNOSIS — Z79.01 WARFARIN ANTICOAGULATION: Primary | ICD-10-CM

## 2020-01-15 DIAGNOSIS — E55.9 VITAMIN D DEFICIENCY: ICD-10-CM

## 2020-01-15 DIAGNOSIS — M32.9 SYSTEMIC LUPUS ERYTHEMATOSUS, UNSPECIFIED SLE TYPE, UNSPECIFIED ORGAN INVOLVEMENT STATUS (HCC): Primary | ICD-10-CM

## 2020-01-15 DIAGNOSIS — E66.01 CLASS 3 SEVERE OBESITY DUE TO EXCESS CALORIES WITH SERIOUS COMORBIDITY AND BODY MASS INDEX (BMI) OF 45.0 TO 49.9 IN ADULT (HCC): ICD-10-CM

## 2020-01-15 DIAGNOSIS — M47.816 SPONDYLOSIS OF LUMBAR REGION WITHOUT MYELOPATHY OR RADICULOPATHY: ICD-10-CM

## 2020-01-15 DIAGNOSIS — Z72.0 TOBACCO USER: ICD-10-CM

## 2020-01-15 DIAGNOSIS — M32.9 SYSTEMIC LUPUS ERYTHEMATOSUS, UNSPECIFIED SLE TYPE, UNSPECIFIED ORGAN INVOLVEMENT STATUS (HCC): ICD-10-CM

## 2020-01-15 DIAGNOSIS — Z86.711 HISTORY OF PULMONARY EMBOLISM: ICD-10-CM

## 2020-01-15 DIAGNOSIS — M16.0 PRIMARY OSTEOARTHRITIS OF BOTH HIPS: ICD-10-CM

## 2020-01-15 PROBLEM — E66.813 CLASS 3 SEVERE OBESITY DUE TO EXCESS CALORIES WITH SERIOUS COMORBIDITY AND BODY MASS INDEX (BMI) OF 45.0 TO 49.9 IN ADULT (HCC): Status: ACTIVE | Noted: 2020-01-15

## 2020-01-15 PROBLEM — E66.813 CLASS 3 SEVERE OBESITY DUE TO EXCESS CALORIES WITH SERIOUS COMORBIDITY AND BODY MASS INDEX (BMI) OF 45.0 TO 49.9 IN ADULT: Status: ACTIVE | Noted: 2020-01-15

## 2020-01-15 LAB
BASOPHILS # BLD AUTO: 0.08 X10(3) UL (ref 0–0.2)
BASOPHILS NFR BLD AUTO: 0.4 %
C3 SERPL-MCNC: 92 MG/DL (ref 90–180)
C4 SERPL-MCNC: 15.5 MG/DL (ref 10–40)
CRP SERPL-MCNC: 1 MG/DL (ref ?–0.3)
DEPRECATED RDW RBC AUTO: 50.1 FL (ref 35.1–46.3)
EOSINOPHIL # BLD AUTO: 0.05 X10(3) UL (ref 0–0.7)
EOSINOPHIL NFR BLD AUTO: 0.2 %
ERYTHROCYTE [DISTWIDTH] IN BLOOD BY AUTOMATED COUNT: 14 % (ref 11–15)
HCT VFR BLD AUTO: 44.6 % (ref 35–48)
HGB BLD-MCNC: 14.2 G/DL (ref 12–16)
IMM GRANULOCYTES # BLD AUTO: 0.13 X10(3) UL (ref 0–1)
IMM GRANULOCYTES NFR BLD: 0.6 %
INR: 2.4 (ref 0.8–1.2)
LYMPHOCYTES # BLD AUTO: 1.26 X10(3) UL (ref 1–4)
LYMPHOCYTES NFR BLD AUTO: 6.1 %
MCH RBC QN AUTO: 30.9 PG (ref 26–34)
MCHC RBC AUTO-ENTMCNC: 31.8 G/DL (ref 31–37)
MCV RBC AUTO: 97 FL (ref 80–100)
MONOCYTES # BLD AUTO: 1.18 X10(3) UL (ref 0.1–1)
MONOCYTES NFR BLD AUTO: 5.7 %
NEUTROPHILS # BLD AUTO: 17.84 X10 (3) UL (ref 1.5–7.7)
NEUTROPHILS # BLD AUTO: 17.84 X10(3) UL (ref 1.5–7.7)
NEUTROPHILS NFR BLD AUTO: 87 %
PLATELET # BLD AUTO: 229 10(3)UL (ref 150–450)
RBC # BLD AUTO: 4.6 X10(6)UL (ref 3.8–5.3)
SED RATE-ML: 8 MM/HR (ref 0–25)
TSI SER-ACNC: 0.48 MIU/ML (ref 0.36–3.74)
VIT D+METAB SERPL-MCNC: 20.7 NG/ML (ref 30–100)
WBC # BLD AUTO: 20.5 X10(3) UL (ref 4–11)

## 2020-01-15 PROCEDURE — 85610 PROTHROMBIN TIME: CPT | Performed by: FAMILY MEDICINE

## 2020-01-15 PROCEDURE — 86160 COMPLEMENT ANTIGEN: CPT

## 2020-01-15 PROCEDURE — 85025 COMPLETE CBC W/AUTO DIFF WBC: CPT

## 2020-01-15 PROCEDURE — 86256 FLUORESCENT ANTIBODY TITER: CPT

## 2020-01-15 PROCEDURE — 36415 COLL VENOUS BLD VENIPUNCTURE: CPT

## 2020-01-15 PROCEDURE — 85652 RBC SED RATE AUTOMATED: CPT

## 2020-01-15 PROCEDURE — 99205 OFFICE O/P NEW HI 60 MIN: CPT | Performed by: INTERNAL MEDICINE

## 2020-01-15 PROCEDURE — 82306 VITAMIN D 25 HYDROXY: CPT

## 2020-01-15 PROCEDURE — 86140 C-REACTIVE PROTEIN: CPT

## 2020-01-15 PROCEDURE — 86038 ANTINUCLEAR ANTIBODIES: CPT

## 2020-01-15 PROCEDURE — 84443 ASSAY THYROID STIM HORMONE: CPT

## 2020-01-15 NOTE — PROGRESS NOTES
[facility] RHE[facility]AT[facility]  Report of Consultation    Stefani Owusu Patient Status:  No patient class for patient encounter    1960 MRN [id number]   Location [facility] PCP Yossi Leiva DO     Date of Consult:  1/15/20    Reason for Consultat Medications:   Current Outpatient Medications:   •  STIOLTO RESPIMAT 2.5-2.5 MCG/ACT Inhalation Aero Soln, INL 2 PFS PO QD, Disp: , Rfl: 3  •  Albuterol Sulfate  (90 Base) MCG/ACT Inhalation Aero Soln, Inhale 2 puffs into the lungs every 6 (six) evening, Disp: 30 tablet, Rfl: 1  •  LORazepam 0.5 MG Oral Tab, Take 0.5 mg by mouth., Disp: , Rfl:   •  predniSONE 10 MG Oral Tab, 4 tabs daily 5 days 2 tab daily 5 days 1 tab 5 days (Patient not taking: Reported on 1/15/2020 ), Disp: 35 tablet, Rfl: 0  • extremities. Knees bilaterally tender. Skin: Red blotches V of neck.     Laboratory Data: 1/9/2020 chemistry profile glucose 91 sodium 140 potassium 4.0 chloride 106 BUN 19 creatinine 1.12 calcium 8.8 GFR 54  AST 17 ALT 23 alkaline phosphatase 68 bilirubi [facility])    Recommendations:   Patient Instructions   Labs for Lupus, thyroid disease, anemia and Vit D deficiency ordered. Continue Plaquenil 200 mg twice a day. Use Prednisone 10 mg per day, once infection over lower to 5 mg per day.   Additional lupus med

## 2020-01-15 NOTE — PATIENT INSTRUCTIONS
Labs for Lupus, thyroid disease, anemia and Vit D deficiency ordered. Continue Plaquenil 200 mg twice a day. Use Prednisone 10 mg per day, once infection over lower to 5 mg per day.   Additional lupus medications are methotrexate, CellCept, or the injecti

## 2020-01-16 ENCOUNTER — TELEPHONE (OUTPATIENT)
Dept: RHEUMATOLOGY | Facility: CLINIC | Age: 60
End: 2020-01-16

## 2020-01-16 RX ORDER — ERGOCALCIFEROL 1.25 MG/1
CAPSULE ORAL
Qty: 8 CAPSULE | Refills: 0 | Status: SHIPPED | OUTPATIENT
Start: 2020-01-16 | End: 2020-09-01 | Stop reason: ALTCHOICE

## 2020-01-16 RX ORDER — WARFARIN SODIUM 4 MG/1
TABLET ORAL
Qty: 180 TABLET | Refills: 0 | OUTPATIENT
Start: 2020-01-16

## 2020-01-16 NOTE — TELEPHONE ENCOUNTER
*spoke w/patient. States she currently does not need this med refilled.      LOV: 1/9/20    LAST LAB: 1/16/20    LAST RX: 10/14/19, 60 tabs    Next OV:   Future Appointments   Date Time Provider Meredith Jeter   0/1/0898 97:06 Kamaljit Mckeon MD EM

## 2020-01-17 LAB — ANA SCREEN: NEGATIVE

## 2020-01-17 RX ORDER — HYDROCODONE BITARTRATE AND ACETAMINOPHEN 5; 325 MG/1; MG/1
1-2 TABLET ORAL EVERY 4 HOURS PRN
Qty: 30 TABLET | Refills: 0 | Status: SHIPPED | OUTPATIENT
Start: 2020-01-17 | End: 2020-03-09

## 2020-01-17 NOTE — TELEPHONE ENCOUNTER
EARLY FILL OF HYDROCODONE    HAS BEEN ILL SINCE BEGINING OF YEAR   HAS 1 LEFT    CALL TO [facility] IN [facility]

## 2020-01-17 NOTE — TELEPHONE ENCOUNTER
LOV: 1/9/20    LAST LAB: 01/15/20    LAST RX: 12/9/19, 30 tabs, 0 refills    Next OV:   Future Appointments   Date Time Provider Meredith Jeter   1/3/5972 93:37 AM Brit Herzog MD [facility] Katherine Cordova       PROTOCOL: n/a

## 2020-01-18 ENCOUNTER — TELEPHONE (OUTPATIENT)
Dept: FAMILY MEDICINE CLINIC | Facility: CLINIC | Age: 60
End: 2020-01-18

## 2020-01-18 NOTE — TELEPHONE ENCOUNTER
QUESTION ABOUT MEDICATION SHE HAS AT HOME. WONDERING IF SHE CAN TAKE IT?  SHE DOES NOT WANT TO GO OUT ANYWHERE TODAY.

## 2020-01-18 NOTE — TELEPHONE ENCOUNTER
Calling the patient-     2 days ago headache, sore throat   Now she is coughing and coughing up green stuff. She got it from her son     She has some Augmentin and would like to know if she can take it.   I advised that she may have a viral infection and th

## 2020-01-20 ENCOUNTER — OFFICE VISIT (OUTPATIENT)
Dept: FAMILY MEDICINE CLINIC | Facility: CLINIC | Age: 60
End: 2020-01-20
Payer: MEDICARE

## 2020-01-20 VITALS
SYSTOLIC BLOOD PRESSURE: 120 MMHG | TEMPERATURE: 97 F | OXYGEN SATURATION: 96 % | WEIGHT: 280 LBS | DIASTOLIC BLOOD PRESSURE: 80 MMHG | BODY MASS INDEX: 43 KG/M2 | HEART RATE: 80 BPM

## 2020-01-20 DIAGNOSIS — J44.1 CHRONIC OBSTRUCTIVE PULMONARY DISEASE WITH ACUTE EXACERBATION (HCC): Primary | ICD-10-CM

## 2020-01-20 PROCEDURE — 99214 OFFICE O/P EST MOD 30 MIN: CPT | Performed by: FAMILY MEDICINE

## 2020-01-20 RX ORDER — PREDNISONE 20 MG/1
20 TABLET ORAL 2 TIMES DAILY
Qty: 14 TABLET | Refills: 0 | Status: SHIPPED | OUTPATIENT
Start: 2020-01-20 | End: 2020-03-09

## 2020-01-20 RX ORDER — CLARITHROMYCIN 500 MG/1
500 TABLET, COATED ORAL 2 TIMES DAILY
Qty: 7 TABLET | Refills: 0 | Status: SHIPPED | OUTPATIENT
Start: 2020-01-20 | End: 2020-12-29

## 2020-01-20 NOTE — PROGRESS NOTES
Henrry Pandya is a 60year old female. Patient presents with:  Cough: fup on cough-started 1 month ago-has been seen for this before-pt has a specimen of mucus. .... [facility] room 2      HPI:   Patient was seen originally in the beginning of January with an [facility] [place] Oral Tab, Take 500 mg by mouth every 6 (six) hours as needed for Pain., Disp: , Rfl:   Omeprazole 40 MG Oral Capsule Delayed Release, Take 40 mg by mouth daily. , Disp: , Rfl:   Mometasone Furoate 0.1 % External Cream, Apply 1 Application topically 2 (two) adenoma of colon 1/19/2017   • Visual impairment     glasses for reading   • Warfarin anticoagulation 7/19/2017     Past Surgical History:   Procedure Laterality Date   • CHOLECYSTECTOMY      2018Corey   • COLONOSCOPY     • LAPAROSCOPIC CHOLECYSTECTOMY N will repeat another course of prednisone. Going to have her try clarithromycin to cover for the atypical bacteria. I have instructed her to decrease her warfarin from 10 mg down to 5 mg daily while she is on the clarithromycin. Increase fluids.   If no r

## 2020-01-21 ENCOUNTER — TELEPHONE (OUTPATIENT)
Dept: FAMILY MEDICINE CLINIC | Facility: CLINIC | Age: 60
End: 2020-01-21

## 2020-01-21 NOTE — TELEPHONE ENCOUNTER
Called the patient-     She is seeing the Rheumatologist and he put her on Vitamin D and she wanted to make sure that it will not interfere with her antibiotic?      I advised that she will be ok to take them both

## 2020-01-28 RX ORDER — ALPRAZOLAM 0.5 MG/1
TABLET ORAL
Qty: 90 TABLET | Refills: 0 | Status: SHIPPED | OUTPATIENT
Start: 2020-01-28 | End: 2020-03-02

## 2020-01-28 NOTE — TELEPHONE ENCOUNTER
Last refill #90 on 12/27/19  Last office visit on 1/20/20  Future Appointments   Date Time Provider Meredith Jeter   4/3/8447 31:53 AM Charles Perez MD [facility] Max Fallon     Routed to Dr. Elisabeth Fuller

## 2020-02-07 ENCOUNTER — TELEPHONE (OUTPATIENT)
Dept: FAMILY MEDICINE CLINIC | Facility: CLINIC | Age: 60
End: 2020-02-07

## 2020-02-07 ENCOUNTER — OFFICE VISIT (OUTPATIENT)
Dept: RHEUMATOLOGY | Facility: CLINIC | Age: 60
End: 2020-02-07
Payer: MEDICARE

## 2020-02-07 VITALS
RESPIRATION RATE: 20 BRPM | HEART RATE: 80 BPM | SYSTOLIC BLOOD PRESSURE: 124 MMHG | BODY MASS INDEX: 43.35 KG/M2 | WEIGHT: 286 LBS | DIASTOLIC BLOOD PRESSURE: 70 MMHG | HEIGHT: 68 IN

## 2020-02-07 DIAGNOSIS — M32.9 SYSTEMIC LUPUS ERYTHEMATOSUS, UNSPECIFIED SLE TYPE, UNSPECIFIED ORGAN INVOLVEMENT STATUS (HCC): Primary | ICD-10-CM

## 2020-02-07 DIAGNOSIS — J06.9 VIRAL UPPER RESPIRATORY TRACT INFECTION: ICD-10-CM

## 2020-02-07 DIAGNOSIS — E55.9 VITAMIN D DEFICIENCY: ICD-10-CM

## 2020-02-07 DIAGNOSIS — Z72.0 TOBACCO USER: ICD-10-CM

## 2020-02-07 DIAGNOSIS — R05.9 COUGH: Primary | ICD-10-CM

## 2020-02-07 DIAGNOSIS — M47.816 SPONDYLOSIS OF LUMBAR REGION WITHOUT MYELOPATHY OR RADICULOPATHY: ICD-10-CM

## 2020-02-07 DIAGNOSIS — M79.7 FIBROMYALGIA: ICD-10-CM

## 2020-02-07 DIAGNOSIS — E66.01 CLASS 3 SEVERE OBESITY DUE TO EXCESS CALORIES WITH SERIOUS COMORBIDITY AND BODY MASS INDEX (BMI) OF 45.0 TO 49.9 IN ADULT (HCC): ICD-10-CM

## 2020-02-07 DIAGNOSIS — J44.1 CHRONIC OBSTRUCTIVE PULMONARY DISEASE WITH ACUTE EXACERBATION (HCC): ICD-10-CM

## 2020-02-07 PROCEDURE — 99214 OFFICE O/P EST MOD 30 MIN: CPT | Performed by: INTERNAL MEDICINE

## 2020-02-07 NOTE — TELEPHONE ENCOUNTER
Pt states she has completed 7 days of Clarithromycin. Still not feeling well. States she is coughing up yellow/green mucous x 2 days. Looks like you recommended a chest xray if symptoms did not improve. Would you like to order this?      Does she need a

## 2020-02-07 NOTE — PATIENT INSTRUCTIONS
Current plan use Plaquenil 200 mg twice a day. Use Prednisone 10 mg per day. Use Voltaren gel as needed. Quit smoking. Eat a low fat low calorie diet. Take your cough syrup, drink plenty of fluid.   SIRIA/GARY/DNA now negative which suggest Lupus is monique

## 2020-02-07 NOTE — PROGRESS NOTES
[facility]  Dr. Uvaldo Araujo Progress Note     Subjective:   Joe Hebert is a(n) 60year old female.    Current complaints: Patient presents with:  SLE: 1 month f/u, here to review blood tests and outline plan of care  Hx of positive SIRIA with pleurisy 5 ye back.  Remain D 50,000 units/week, along with over-the-counter vitamin D 1000 units daily. Return to office 3 months.         Sylvan Kanner, MD 0/3/8575 73:15 PM

## 2020-02-07 NOTE — TELEPHONE ENCOUNTER
Please have her make an appointment this afternoon. Please have her get a chest x-ray before I see her.

## 2020-02-07 NOTE — TELEPHONE ENCOUNTER
Left detailed message for pt to call and let us know what her current symptoms are  Advised that inhaler has refills but concerned that she has used all 200 doses in one month.     Dr Elisabeth Fuller does pt need to see you again for recheck and Chest xray per yo

## 2020-02-08 RX ORDER — PREDNISONE 20 MG/1
20 TABLET ORAL 2 TIMES DAILY
Qty: 14 TABLET | Refills: 0 | Status: SHIPPED | OUTPATIENT
Start: 2020-02-08 | End: 2020-02-10 | Stop reason: ALTCHOICE

## 2020-02-08 RX ORDER — ALBUTEROL SULFATE 90 UG/1
2 AEROSOL, METERED RESPIRATORY (INHALATION) EVERY 6 HOURS PRN
Qty: 1 INHALER | Refills: 2 | Status: SHIPPED | OUTPATIENT
Start: 2020-02-08

## 2020-02-08 NOTE — TELEPHONE ENCOUNTER
Reason For Visit  Areli Sofia is here today for a nurse visit for TB reading TB read 0.0mm negative. Current Meds   1. ValACYclovir HCl - 500 MG Oral Tablet; Take 1 tablet twice a day; Therapy: [id number] to (Evaluate:[id number])  Requested for: [id number]; Last   Rx:[id number] Ordered    Allergies  No Known Allergies    Signatures   Electronically signed by :  Carleen Buckley, ; May  8 2017  1:05PM CST Spoke with patient who states she has had a dry cough since December. Is requesting refill of Albuterol, prednisone, and antibiotic. Per Dr. Zhao Christiansen will refill Albuterol, prescribe prednisone and he would like to see patient.  Appointment made for Monday

## 2020-02-10 ENCOUNTER — OFFICE VISIT (OUTPATIENT)
Dept: FAMILY MEDICINE CLINIC | Facility: CLINIC | Age: 60
End: 2020-02-10
Payer: MEDICARE

## 2020-02-10 VITALS
OXYGEN SATURATION: 98 % | DIASTOLIC BLOOD PRESSURE: 74 MMHG | HEIGHT: 68 IN | SYSTOLIC BLOOD PRESSURE: 120 MMHG | HEART RATE: 82 BPM | WEIGHT: 286.38 LBS | BODY MASS INDEX: 43.4 KG/M2 | TEMPERATURE: 97 F

## 2020-02-10 DIAGNOSIS — J44.1 CHRONIC OBSTRUCTIVE PULMONARY DISEASE WITH ACUTE EXACERBATION (HCC): ICD-10-CM

## 2020-02-10 DIAGNOSIS — J84.9 INTERSTITIAL LUNG DISEASE (HCC): ICD-10-CM

## 2020-02-10 DIAGNOSIS — Z72.0 TOBACCO USER: Primary | ICD-10-CM

## 2020-02-10 PROCEDURE — 99214 OFFICE O/P EST MOD 30 MIN: CPT | Performed by: FAMILY MEDICINE

## 2020-02-10 NOTE — PROGRESS NOTES
Amalia Stein is a 60year old female. Patient presents with:  URI: cough, congestion-started 12/02/19-has been taking mucinex dm, robitussin dm, mucinex dm 12 hour, using inhalers, prednisone. ...room 2  Other: pt got tarbar to quit smoking but started cou MG Oral Tab, TAKE 1 TABLET BY MOUTH EVERY DAY, Disp: 180 tablet, Rfl: 0  Cyclobenzaprine HCl 10 MG Oral Tab, 1 po up to tid prn muscle spasms, Disp: 90 tablet, Rfl: 0  WARFARIN SODIUM 4 MG Oral Tab, TAKE 2 TABLETS BY MOUTH DAILY, Disp: 60 tablet, Rfl: 0  W Inhaler, Rfl: 2    No current facility-administered medications on file prior to visit.         Past Medical History:   Diagnosis Date   • Adrenal insufficiency ([facility] [address]     due to steroids   • Anxiety state    • Chronic low back pain    • COPD (chronic obstru denies headaches    EXAM:   /74   Pulse 82   Temp 97 °F (36.1 °C) (Tympanic)   Ht 68\"   Wt 286 lb 6 oz (129.9 kg)   SpO2 98%   BMI 43.54 kg/m²   GENERAL: well developed, well nourished,in no apparent distress  SKIN: no rashes,no suspicious lesions

## 2020-02-11 RX ORDER — HYDROXYCHLOROQUINE SULFATE 200 MG/1
TABLET, FILM COATED ORAL
Qty: 180 TABLET | Refills: 1 | Status: SHIPPED | OUTPATIENT
Start: 2020-02-11 | End: 2020-05-11

## 2020-02-17 ENCOUNTER — ANTI-COAG VISIT (OUTPATIENT)
Dept: FAMILY MEDICINE CLINIC | Facility: CLINIC | Age: 60
End: 2020-02-17
Payer: MEDICARE

## 2020-02-17 LAB — INR: 2.6 (ref 0.8–1.2)

## 2020-02-17 PROCEDURE — 93793 ANTICOAG MGMT PT WARFARIN: CPT | Performed by: FAMILY MEDICINE

## 2020-02-17 PROCEDURE — 85610 PROTHROMBIN TIME: CPT | Performed by: FAMILY MEDICINE

## 2020-03-02 RX ORDER — CYCLOBENZAPRINE HCL 10 MG
TABLET ORAL
Qty: 90 TABLET | Refills: 0 | Status: SHIPPED | OUTPATIENT
Start: 2020-03-02 | End: 2020-05-06

## 2020-03-02 RX ORDER — ALPRAZOLAM 0.5 MG/1
TABLET ORAL
Qty: 90 TABLET | Refills: 0 | Status: SHIPPED | OUTPATIENT
Start: 2020-03-02 | End: 2020-04-02

## 2020-03-02 NOTE — TELEPHONE ENCOUNTER
Last refill #90 on 1/28/2020  Last refill flexeril #90 on 10/21/19  Last office visit on 2/10/2020  Future Appointments   Date Time Provider Meredith Jeter   3/18/2020  4:00 PM EM[facility] NURSE CORWIN Perdomo   2/74/0069 93:73 AM Jerod Speaks

## 2020-03-09 RX ORDER — HYDROCODONE BITARTRATE AND ACETAMINOPHEN 5; 325 MG/1; MG/1
1-2 TABLET ORAL EVERY 4 HOURS PRN
Qty: 30 TABLET | Refills: 0 | Status: SHIPPED | OUTPATIENT
Start: 2020-03-09 | End: 2020-05-11

## 2020-03-09 RX ORDER — PREDNISONE 20 MG/1
TABLET ORAL
Qty: 14 TABLET | Refills: 0 | Status: SHIPPED | OUTPATIENT
Start: 2020-03-09 | End: 2020-04-03

## 2020-03-09 RX ORDER — ATENOLOL 25 MG/1
TABLET ORAL
Qty: 90 TABLET | Refills: 0 | Status: SHIPPED | OUTPATIENT
Start: 2020-03-09 | End: 2020-06-09

## 2020-03-09 NOTE — TELEPHONE ENCOUNTER
Last refill on prednisone #14 on 1/20/2020  Last refill on atenolol #90 on 12/12/19  Last office visit on 2/10/2020  Future Appointments   Date Time Provider Meredith Jeter   3/18/2020  4:00 PM EM[facility] NURSE CORWIN Perdomo   5/11/2020 11:45 AM

## 2020-03-13 RX ORDER — ERGOCALCIFEROL 1.25 MG/1
CAPSULE ORAL
Qty: 8 CAPSULE | Refills: 0 | OUTPATIENT
Start: 2020-03-13

## 2020-03-16 ENCOUNTER — OFFICE VISIT (OUTPATIENT)
Dept: FAMILY MEDICINE CLINIC | Facility: CLINIC | Age: 60
End: 2020-03-16
Payer: MEDICARE

## 2020-03-16 VITALS
TEMPERATURE: 98 F | SYSTOLIC BLOOD PRESSURE: 122 MMHG | HEIGHT: 68 IN | BODY MASS INDEX: 44.12 KG/M2 | OXYGEN SATURATION: 96 % | HEART RATE: 72 BPM | WEIGHT: 291.13 LBS | DIASTOLIC BLOOD PRESSURE: 74 MMHG

## 2020-03-16 DIAGNOSIS — R30.0 DYSURIA: Primary | ICD-10-CM

## 2020-03-16 LAB
APPEARANCE: CLEAR
BILIRUBIN: NEGATIVE
GLUCOSE (URINE DIPSTICK): NEGATIVE MG/DL
INR: 3 (ref 0.8–1.2)
KETONES (URINE DIPSTICK): NEGATIVE MG/DL
MULTISTIX LOT#: NORMAL NUMERIC
NITRITE, URINE: NEGATIVE
OCCULT BLOOD: NEGATIVE
PH, URINE: 5.5 (ref 4.5–8)
PROTEIN (URINE DIPSTICK): NEGATIVE MG/DL
SPECIFIC GRAVITY: 1.02 (ref 1–1.03)
URINE-COLOR: YELLOW
UROBILINOGEN,SEMI-QN: 0.2 MG/DL (ref 0–1.9)

## 2020-03-16 PROCEDURE — 99213 OFFICE O/P EST LOW 20 MIN: CPT | Performed by: FAMILY MEDICINE

## 2020-03-16 PROCEDURE — 87186 SC STD MICRODIL/AGAR DIL: CPT | Performed by: FAMILY MEDICINE

## 2020-03-16 PROCEDURE — 87077 CULTURE AEROBIC IDENTIFY: CPT | Performed by: FAMILY MEDICINE

## 2020-03-16 PROCEDURE — 85610 PROTHROMBIN TIME: CPT | Performed by: FAMILY MEDICINE

## 2020-03-16 PROCEDURE — 87086 URINE CULTURE/COLONY COUNT: CPT | Performed by: FAMILY MEDICINE

## 2020-03-16 PROCEDURE — 81003 URINALYSIS AUTO W/O SCOPE: CPT | Performed by: FAMILY MEDICINE

## 2020-03-16 RX ORDER — WARFARIN SODIUM 10 MG/1
10 TABLET ORAL NIGHTLY
Qty: 30 TABLET | Refills: 0 | Status: SHIPPED | OUTPATIENT
Start: 2020-03-16 | End: 2020-03-16

## 2020-03-16 RX ORDER — NITROFURANTOIN MACROCRYSTALS 100 MG/1
100 CAPSULE ORAL 2 TIMES DAILY
Qty: 14 CAPSULE | Refills: 0 | Status: SHIPPED | OUTPATIENT
Start: 2020-03-16 | End: 2020-06-20 | Stop reason: SDUPTHER

## 2020-03-16 RX ORDER — WARFARIN SODIUM 10 MG/1
TABLET ORAL
Qty: 90 TABLET | Refills: 0 | Status: SHIPPED | OUTPATIENT
Start: 2020-03-16 | End: 2020-03-16

## 2020-03-16 NOTE — TELEPHONE ENCOUNTER
Last office visit:  02/10/20  Last inr:  02/17/20    Future Appointments   Date Time Provider Meredith Jeter   3/16/2020  3:30 PM Aliza Kinney DO [facility] [place]   3/18/2020  4:00 PM [facility] [place] NURSE [facility] [place]   5/11/2020 11:45 AM L

## 2020-03-16 NOTE — TELEPHONE ENCOUNTER
Last refill #30 on 10/3/19  Last office visit on 2/10/2020  Future Appointments   Date Time Provider Meredith Jeter   3/16/2020  3:30 PM Cristel Del Cid DO [facility] [place]   3/18/2020  4:00 PM [facility] [place] NURSE [facility] [place]   5/11/2020 11:

## 2020-03-16 NOTE — PROGRESS NOTES
Cyrus Sheikh is a 60year old female. Patient presents with:  Dysuria: dysuria since 03/13-has not taken anything otc. ...room 2      HPI:     Patient presents with symptoms of UTI. Complaining of urinary frequency, urgency, dysuria, suprapubic pain.    Philipp Urrutia 4 MG Oral Tab, TAKE 2 TABLETS BY MOUTH DAILY, Disp: 180 tablet, Rfl: 0  VENTOLIN  (90 Base) MCG/ACT Inhalation Aero Soln, INL 2 PUFFS PO Q 6 H PRF WHZ, Disp: , Rfl: 10  acetaminophen 500 MG Oral Tab, Take 500 mg by mouth every 6 (six) hours as neede Packs/day: 0.50        Years: 40.00        Pack years: 20        Types: Cigarettes      Smokeless tobacco: Never Used      Tobacco comment: cutting back certainly now    Alcohol use:  Yes      Alcohol/week: 0.0 standard drinks      Comment: social    Drug u understanding of these issues and agrees to the plan. The patient is asked to return in 3 days if not better. Call if fever, vomiting, worsening symptoms. Urine sent for culture . We will call with results as availiable.   Orders Placed This Encounter

## 2020-04-02 RX ORDER — ALPRAZOLAM 0.5 MG/1
TABLET ORAL
Qty: 90 TABLET | Refills: 0 | Status: SHIPPED | OUTPATIENT
Start: 2020-04-02 | End: 2020-05-06

## 2020-04-03 RX ORDER — PREDNISONE 20 MG/1
TABLET ORAL
Qty: 14 TABLET | Refills: 0 | Status: SHIPPED | OUTPATIENT
Start: 2020-04-03 | End: 2020-04-16

## 2020-04-16 ENCOUNTER — TELEPHONE (OUTPATIENT)
Dept: FAMILY MEDICINE CLINIC | Facility: CLINIC | Age: 60
End: 2020-04-16

## 2020-04-16 ENCOUNTER — APPOINTMENT (OUTPATIENT)
Dept: FAMILY MEDICINE CLINIC | Facility: CLINIC | Age: 60
End: 2020-04-16
Payer: MEDICARE

## 2020-04-16 DIAGNOSIS — Z02.9 ENCOUNTERS FOR UNSPECIFIED ADMINISTRATIVE PURPOSE: Primary | ICD-10-CM

## 2020-04-16 DIAGNOSIS — S76.112A STRAIN OF LEFT QUADRICEPS, INITIAL ENCOUNTER: Primary | ICD-10-CM

## 2020-04-16 PROCEDURE — 99441 PHONE E/M BY PHYS 5-10 MIN: CPT | Performed by: FAMILY MEDICINE

## 2020-04-16 NOTE — TELEPHONE ENCOUNTER
Left knee pain started can barley walk all the tissue around the knee is causing pain she states she does have a tissue disorder also her left eye has been  bloody. Didn't know if this needed to be seen in office. Also is out of pain medication.      ok'd t

## 2020-04-16 NOTE — TELEPHONE ENCOUNTER
0Virtual/Telephone Check-In    Melany Lombard  verbally consents to a Virtual/Telephone Check-In service on 4/16/2020 . Patient understands and accepts financial responsibility for any deductible, co-insurance and/or co-pays associated with this service. of the ordinary that she thinks may have injured it.     Allergies:    Magnesium               SWELLING  Potassium Chloride      ANGIOEDEMA  Augmentin [Amoxicil*    DIZZINESS  Ciprofloxacin           OTHER (SEE COMMENTS)    Comment:unsure  Ibuprofen Warfarin Sodium 10 MG Oral Tab Take 1/2 tab nightly with a 4mg tab to equal total dose of 9mg nightly 30 tablet 0   • Warfarin Sodium 1 MG Oral Tab Take 1 PO with 8mg=9mg QHS  0   • WARFARIN SODIUM 4 MG Oral Tab TAKE 2 TABLETS BY MOUTH DAILY 180 tablet 0 TUBAL LIGATION        Family History   Problem Relation Age of Onset   • Cancer Mother         lung   • Other (Other) Sister         SLE   • Ovarian Cancer Maternal Aunt 61      Social History    Tobacco Use      Smoking status: Current Every Day Smoker opportunity to ask questions and they were answered to her satisfaction. Meds & Refills for this Visit:  Requested Prescriptions      No prescriptions requested or ordered in this encounter       Imaging & Consults:  None    No follow-ups on file.

## 2020-04-25 DIAGNOSIS — J44.1 CHRONIC OBSTRUCTIVE PULMONARY DISEASE WITH ACUTE EXACERBATION (HCC): ICD-10-CM

## 2020-04-26 NOTE — TELEPHONE ENCOUNTER
Asthma & COPD Medication Protocol Failed4/25 8:44 AM   Asthma Action Score greater than or equal to 20    AAP/ACT given in last 12 months    Appointment in past 6 or next 3 months      PT WAS SEEN 3-16-20 FOR UTI    PT HAD REFILL 2-8-20 X 3 INHALERS, TOO S

## 2020-05-06 RX ORDER — CYCLOBENZAPRINE HCL 10 MG
TABLET ORAL
Qty: 90 TABLET | Refills: 0 | Status: SHIPPED | OUTPATIENT
Start: 2020-05-06 | End: 2020-08-26

## 2020-05-06 RX ORDER — ALPRAZOLAM 0.5 MG/1
TABLET ORAL
Qty: 90 TABLET | Refills: 0 | Status: SHIPPED | OUTPATIENT
Start: 2020-05-06 | End: 2020-06-24

## 2020-05-06 NOTE — TELEPHONE ENCOUNTER
LOV:  3/16/2020   LAB:  1/15/2020   LRX:    ALPRAZOLAM 0.5 MG Oral Tab 90 tablet 0 refill 4/2/2020     NOV:    Future Appointments   Date Time Provider Meredith Jeter   1/78/1427 63:69 AM Juan Shoemaker MD [facility] Jassi Fournier

## 2020-05-06 NOTE — TELEPHONE ENCOUNTER
LOV:  3/16/2020   LRX:  cyclobenzaprine 10 MG Oral Tab 90 tablet 0 refill 3/2/2020  LAB:  1/15/2020   NOV:  Future Appointments   Date Time Provider Meredith Jeter   5/32/8394 72:37 AM Lavinia Ayala MD [facility] Jacek Mujica

## 2020-05-11 ENCOUNTER — VIRTUAL PHONE E/M (OUTPATIENT)
Dept: RHEUMATOLOGY | Facility: CLINIC | Age: 60
End: 2020-05-11
Payer: MEDICARE

## 2020-05-11 DIAGNOSIS — M32.9 SYSTEMIC LUPUS ERYTHEMATOSUS, UNSPECIFIED SLE TYPE, UNSPECIFIED ORGAN INVOLVEMENT STATUS (HCC): Primary | ICD-10-CM

## 2020-05-11 DIAGNOSIS — M16.0 PRIMARY OSTEOARTHRITIS OF BOTH HIPS: ICD-10-CM

## 2020-05-11 DIAGNOSIS — M79.7 FIBROMYALGIA: ICD-10-CM

## 2020-05-11 DIAGNOSIS — M47.816 SPONDYLOSIS OF LUMBAR REGION WITHOUT MYELOPATHY OR RADICULOPATHY: ICD-10-CM

## 2020-05-11 DIAGNOSIS — M79.605 LEFT LEG PAIN: ICD-10-CM

## 2020-05-11 PROCEDURE — 99442 PHONE E/M BY PHYS 11-20 MIN: CPT | Performed by: INTERNAL MEDICINE

## 2020-05-11 RX ORDER — HYDROCODONE BITARTRATE AND ACETAMINOPHEN 5; 325 MG/1; MG/1
1-2 TABLET ORAL EVERY 4 HOURS PRN
Qty: 30 TABLET | Refills: 0 | Status: SHIPPED | OUTPATIENT
Start: 2020-05-11 | End: 2020-07-16

## 2020-05-11 RX ORDER — HYDROXYCHLOROQUINE SULFATE 200 MG/1
TABLET, FILM COATED ORAL
Qty: 180 TABLET | Refills: 1 | Status: SHIPPED | OUTPATIENT
Start: 2020-05-11 | End: 2020-11-12

## 2020-05-11 NOTE — TELEPHONE ENCOUNTER
HYDROcodone-acetaminophen 5-325 MG Oral Tab    [facility] [place]    LAST SEEN IN OFFICE ON 03/16/2020    PT ALSO AWARE RX COULD BE REFILLED BY ANOTHER MD IN THE OFFICE AS KK IS OUT OF THE OFFICE

## 2020-05-11 NOTE — PROGRESS NOTES
Virtual Telephone Check-In    Melany Lombard verbally consents to a Virtual/Telephone Check-In visit on 05/11/20. Patient understands and accepts financial responsibility for any deductible, co-insurance and/or co-pays associated with this service.     Dur you are on Coumadin. Occasional use of Norco is okay. Return to office for exam in 3 months. Talia Hastings MD

## 2020-05-13 ENCOUNTER — ANTI-COAG VISIT (OUTPATIENT)
Dept: FAMILY MEDICINE CLINIC | Facility: CLINIC | Age: 60
End: 2020-05-13
Payer: MEDICARE

## 2020-05-13 DIAGNOSIS — Z79.01 LONG TERM CURRENT USE OF ANTICOAGULANT: ICD-10-CM

## 2020-05-13 DIAGNOSIS — Z86.711 HISTORY OF PULMONARY EMBOLISM: Primary | ICD-10-CM

## 2020-05-13 PROCEDURE — 93793 ANTICOAG MGMT PT WARFARIN: CPT | Performed by: FAMILY MEDICINE

## 2020-05-13 PROCEDURE — 85610 PROTHROMBIN TIME: CPT | Performed by: FAMILY MEDICINE

## 2020-05-20 ENCOUNTER — ANTI-COAG VISIT (OUTPATIENT)
Dept: FAMILY MEDICINE CLINIC | Facility: CLINIC | Age: 60
End: 2020-05-20
Payer: MEDICARE

## 2020-05-20 DIAGNOSIS — Z86.711 HISTORY OF PULMONARY EMBOLUS (PE): Primary | ICD-10-CM

## 2020-05-20 DIAGNOSIS — Z79.01 LONG TERM CURRENT USE OF ANTICOAGULANT: ICD-10-CM

## 2020-05-20 PROCEDURE — 93793 ANTICOAG MGMT PT WARFARIN: CPT | Performed by: FAMILY MEDICINE

## 2020-05-20 PROCEDURE — 85610 PROTHROMBIN TIME: CPT | Performed by: FAMILY MEDICINE

## 2020-05-28 RX ORDER — BENAZEPRIL HYDROCHLORIDE 20 MG/1
TABLET ORAL
Qty: 180 TABLET | Refills: 0 | Status: SHIPPED | OUTPATIENT
Start: 2020-05-28 | End: 2021-01-21

## 2020-05-28 NOTE — TELEPHONE ENCOUNTER
Last office visit:  03/16/20  Last refill:  12/02/19   #180, no refills   Last cmp:  01/09/20  Last bp:  03/16/20    122/74     Future Appointments   Date Time Provider Meredith Jeter   6/3/2020 11:00 AM [facility] NURSE CORWIN Perdomo   8/18/2020

## 2020-06-03 ENCOUNTER — ANTI-COAG VISIT (OUTPATIENT)
Dept: FAMILY MEDICINE CLINIC | Facility: CLINIC | Age: 60
End: 2020-06-03
Payer: MEDICARE

## 2020-06-03 DIAGNOSIS — Z79.01 LONG TERM (CURRENT) USE OF ANTICOAGULANTS: ICD-10-CM

## 2020-06-03 DIAGNOSIS — Z86.711 HISTORY OF PULMONARY EMBOLUS (PE): Primary | ICD-10-CM

## 2020-06-03 PROCEDURE — 93793 ANTICOAG MGMT PT WARFARIN: CPT | Performed by: FAMILY MEDICINE

## 2020-06-03 PROCEDURE — 85610 PROTHROMBIN TIME: CPT | Performed by: FAMILY MEDICINE

## 2020-06-04 RX ORDER — HYDROCHLOROTHIAZIDE 25 MG/1
TABLET ORAL
Qty: 90 TABLET | Refills: 0 | Status: SHIPPED | OUTPATIENT
Start: 2020-06-04 | End: 2020-12-21

## 2020-06-04 NOTE — TELEPHONE ENCOUNTER
Hypertension Medications Protocol Passed6/4 7:17 AM   CMP or BMP in past 12 months    Last serum creatinine< 2.0    Appointment in past 6 or next 3 months     Future Appointments   Date Time Provider Meredith Jeter   2/31/5905  7:28 PM Lavinia Ayala

## 2020-06-09 RX ORDER — ATENOLOL 25 MG/1
TABLET ORAL
Qty: 90 TABLET | Refills: 0 | Status: SHIPPED | OUTPATIENT
Start: 2020-06-09 | End: 2020-08-26

## 2020-06-09 NOTE — TELEPHONE ENCOUNTER
Last office visit:  03/16/20  Last refill:  03/09/20  #90, no refills  Last cmp:  01/09/20  Last bp:  03/16/20   122/74

## 2020-06-11 RX ORDER — WARFARIN SODIUM 10 MG/1
TABLET ORAL
Qty: 90 TABLET | Refills: 0 | Status: SHIPPED | OUTPATIENT
Start: 2020-06-11 | End: 2020-11-05

## 2020-06-11 NOTE — TELEPHONE ENCOUNTER
LOV:  3/16/2020     LAB:    1/15/2020   6/3/2020  Pro-time 2.6    LRX:    Warfarin Sodium 10 MG Oral Tab (Discontinued) 30 tablet 0 3/16/2020    NOV:     Future Appointments   Date Time Provider Meredith Jeter   1/03/7450  0:43 PM Adelina Wasserman MD

## 2020-06-17 ENCOUNTER — TELEPHONE (OUTPATIENT)
Dept: FAMILY MEDICINE CLINIC | Facility: CLINIC | Age: 60
End: 2020-06-17

## 2020-06-17 ENCOUNTER — OFFICE VISIT (OUTPATIENT)
Dept: FAMILY MEDICINE CLINIC | Facility: CLINIC | Age: 60
End: 2020-06-17
Payer: MEDICARE

## 2020-06-17 VITALS
OXYGEN SATURATION: 98 % | HEIGHT: 68 IN | RESPIRATION RATE: 20 BRPM | HEART RATE: 78 BPM | DIASTOLIC BLOOD PRESSURE: 78 MMHG | TEMPERATURE: 98 F | SYSTOLIC BLOOD PRESSURE: 124 MMHG | WEIGHT: 291 LBS | BODY MASS INDEX: 44.1 KG/M2

## 2020-06-17 DIAGNOSIS — R39.11 URINARY HESITANCY: ICD-10-CM

## 2020-06-17 DIAGNOSIS — K59.00 CONSTIPATION, UNSPECIFIED CONSTIPATION TYPE: Primary | ICD-10-CM

## 2020-06-17 PROCEDURE — 87086 URINE CULTURE/COLONY COUNT: CPT | Performed by: FAMILY MEDICINE

## 2020-06-17 PROCEDURE — 99213 OFFICE O/P EST LOW 20 MIN: CPT | Performed by: FAMILY MEDICINE

## 2020-06-17 PROCEDURE — 87186 SC STD MICRODIL/AGAR DIL: CPT | Performed by: FAMILY MEDICINE

## 2020-06-17 PROCEDURE — 87088 URINE BACTERIA CULTURE: CPT | Performed by: FAMILY MEDICINE

## 2020-06-17 PROCEDURE — 81003 URINALYSIS AUTO W/O SCOPE: CPT | Performed by: FAMILY MEDICINE

## 2020-06-17 NOTE — PROGRESS NOTES
Joe Hebert is a 60year old female. Patient presents with:  UTI: She said she feels like she cant go uriante one day and the next day she feels fine,she did go to [facility] and they did a UTI on her came back neg.       HPI:   Patient complains that for MG Oral Tab, 4 tabs daily 5 days 2 tab daily 5 days 1 tab 5 days (Patient taking differently: 10 mg. 4 tabs daily 5 days 2 tab daily 5 days 1 tab 5 days ), Disp: 35 tablet, Rfl: 0  Warfarin Sodium 1 MG Oral Tab, Take 1 PO with 8mg=9mg QHS, Disp: , Rfl: 0 Tubular adenoma of colon 1/19/2017   • Visual impairment     glasses for reading   • Warfarin anticoagulation 7/19/2017     Past Surgical History:   Procedure Laterality Date   • CHOLECYSTECTOMY      2018Corey   • COLONOSCOPY     • LAPAROSCOPIC CHOLECYST cystoscopy to further evaluate the issues with the urinary hesitancy. It could be related to constipation. Discussed constipation. First step is to limit starchy foods such as bread, pasta, rice as well as cheese and ban nanas.   Need to have adequate wa

## 2020-06-17 NOTE — TELEPHONE ENCOUNTER
Pt reports UTI symptoms since March. States she took abx x 1 week. Symptoms still come and go; has been managing it by increasing fluid intake and cranberry juice.      States she had an ultrasound, urine test completed at Dr. Mary Swift office and results wer

## 2020-06-17 NOTE — TELEPHONE ENCOUNTER
Pt thinks she has pelvis inflammatory disease, is there a test she can do? Dr Adam Roberts found nothing wrong with her.

## 2020-06-20 RX ORDER — NITROFURANTOIN 25; 75 MG/1; MG/1
100 CAPSULE ORAL 2 TIMES DAILY
Qty: 14 CAPSULE | Refills: 0 | Status: SHIPPED | OUTPATIENT
Start: 2020-06-20 | End: 2020-06-27

## 2020-06-24 RX ORDER — ALPRAZOLAM 0.5 MG/1
TABLET ORAL
Qty: 90 TABLET | Refills: 0 | Status: SHIPPED | OUTPATIENT
Start: 2020-06-24 | End: 2020-07-28

## 2020-06-24 NOTE — TELEPHONE ENCOUNTER
Last OV: 6/17/20  Last refill: 5/6/20 #90 Tablets w/ 0 refills  Requested Prescriptions     Pending Prescriptions Disp Refills   • ALPRAZOLAM 0.5 MG Oral Tab [Pharmacy Med Name: ALPRAZOLAM 0.5MG TABLETS] 90 tablet 0     Sig: TAKE 1 TABLET BY MOUTH THREE TI

## 2020-06-29 RX ORDER — PREDNISONE 20 MG/1
TABLET ORAL
Qty: 90 TABLET | Refills: 0 | Status: SHIPPED | OUTPATIENT
Start: 2020-06-29 | End: 2020-10-15

## 2020-06-29 NOTE — TELEPHONE ENCOUNTER
LOV 6/17/20    LAST LAB    LAST RX 4/16/20     Next OV   Future Appointments   Date Time Provider Meredith Jeter   1/43/8181  2:48 PM Charles Perez MD [facility] Max [place]         PROTOCOL  NONE

## 2020-07-13 ENCOUNTER — ANTI-COAG VISIT (OUTPATIENT)
Dept: FAMILY MEDICINE CLINIC | Facility: CLINIC | Age: 60
End: 2020-07-13
Payer: MEDICARE

## 2020-07-13 DIAGNOSIS — Z79.01 LONG TERM CURRENT USE OF ANTICOAGULANT: ICD-10-CM

## 2020-07-13 DIAGNOSIS — Z86.711 HISTORY OF PULMONARY EMBOLUS (PE): Primary | ICD-10-CM

## 2020-07-13 LAB — INR: 2.8 (ref 0.8–1.2)

## 2020-07-13 PROCEDURE — 85610 PROTHROMBIN TIME: CPT | Performed by: FAMILY MEDICINE

## 2020-07-13 PROCEDURE — 93793 ANTICOAG MGMT PT WARFARIN: CPT | Performed by: FAMILY MEDICINE

## 2020-07-16 NOTE — TELEPHONE ENCOUNTER
HYDROcodone-acetaminophen 5-325 MG Oral Tab   CALL TO [facility] AWARE DR. RODRIGUEZ OUT FOR TODAY.

## 2020-07-17 RX ORDER — HYDROCODONE BITARTRATE AND ACETAMINOPHEN 5; 325 MG/1; MG/1
1-2 TABLET ORAL EVERY 4 HOURS PRN
Qty: 30 TABLET | Refills: 0 | Status: SHIPPED | OUTPATIENT
Start: 2020-07-17 | End: 2020-09-15

## 2020-07-23 ENCOUNTER — TELEPHONE (OUTPATIENT)
Dept: FAMILY MEDICINE CLINIC | Facility: CLINIC | Age: 60
End: 2020-07-23

## 2020-07-23 DIAGNOSIS — Z86.711 HISTORY OF PULMONARY EMBOLISM: Primary | ICD-10-CM

## 2020-07-23 NOTE — TELEPHONE ENCOUNTER
Patient is asking to see cardiologist for evaluation of her symptoms- left leg pain is better, but her ability to walk has worsensed, \"can't walk\" thinks might need stents. She has smoked cigarettes for years.   Has never seen a cardiologist, and thinks

## 2020-07-27 ENCOUNTER — TELEPHONE (OUTPATIENT)
Dept: CARDIOLOGY | Age: 60
End: 2020-07-27

## 2020-07-28 RX ORDER — ALPRAZOLAM 0.5 MG/1
TABLET ORAL
Qty: 90 TABLET | Refills: 0 | Status: SHIPPED | OUTPATIENT
Start: 2020-07-28 | End: 2020-08-26

## 2020-07-28 NOTE — TELEPHONE ENCOUNTER
LOV: 06/14/20    LAST LAB: n/a    LAST RX: 06/24/20    Next OV:   Future Appointments   Date Time Provider Meredith Steffany   4/81/5506  9:51 PM Gregory Fernandez MD [facility] Lucia Watkins       PROTOCOL: n/a

## 2020-08-26 RX ORDER — ALPRAZOLAM 0.5 MG/1
TABLET ORAL
Qty: 90 TABLET | Refills: 0 | Status: SHIPPED | OUTPATIENT
Start: 2020-08-26 | End: 2020-09-26

## 2020-08-26 RX ORDER — CYCLOBENZAPRINE HCL 10 MG
TABLET ORAL
Qty: 90 TABLET | Refills: 0 | Status: SHIPPED | OUTPATIENT
Start: 2020-08-26 | End: 2020-11-19

## 2020-08-26 RX ORDER — ATENOLOL 25 MG/1
TABLET ORAL
Qty: 90 TABLET | Refills: 0 | Status: SHIPPED | OUTPATIENT
Start: 2020-08-26 | End: 2020-12-19

## 2020-08-26 NOTE — TELEPHONE ENCOUNTER
LOV: 06/17/20    LAST RX: cyclobenzaprine - 5/6/20  Atenolol - 6/9/20  Alprazolam - 7/28/20    Next OV:   Future Appointments   Date Time Provider Meredith Jeter   1/33/0896 61:83 PM Lino Gallegos MD [facility] Joey Bingham       PROTOCOL    Hypertensi

## 2020-09-01 ENCOUNTER — LAB ENCOUNTER (OUTPATIENT)
Dept: LAB | Age: 60
End: 2020-09-01
Attending: FAMILY MEDICINE
Payer: MEDICARE

## 2020-09-01 ENCOUNTER — OFFICE VISIT (OUTPATIENT)
Dept: FAMILY MEDICINE CLINIC | Facility: CLINIC | Age: 60
End: 2020-09-01
Payer: MEDICARE

## 2020-09-01 VITALS
OXYGEN SATURATION: 96 % | DIASTOLIC BLOOD PRESSURE: 70 MMHG | TEMPERATURE: 98 F | HEIGHT: 67.5 IN | HEART RATE: 68 BPM | WEIGHT: 284.38 LBS | BODY MASS INDEX: 44.11 KG/M2 | SYSTOLIC BLOOD PRESSURE: 120 MMHG

## 2020-09-01 DIAGNOSIS — N18.30 CKD (CHRONIC KIDNEY DISEASE) STAGE 3, GFR 30-59 ML/MIN (HCC): ICD-10-CM

## 2020-09-01 DIAGNOSIS — Z00.00 ENCOUNTER FOR ANNUAL HEALTH EXAMINATION: Primary | ICD-10-CM

## 2020-09-01 DIAGNOSIS — E66.01 CLASS 3 SEVERE OBESITY DUE TO EXCESS CALORIES WITH SERIOUS COMORBIDITY AND BODY MASS INDEX (BMI) OF 45.0 TO 49.9 IN ADULT (HCC): ICD-10-CM

## 2020-09-01 DIAGNOSIS — I10 ESSENTIAL HYPERTENSION: ICD-10-CM

## 2020-09-01 DIAGNOSIS — M32.9 SYSTEMIC LUPUS ERYTHEMATOSUS, UNSPECIFIED SLE TYPE, UNSPECIFIED ORGAN INVOLVEMENT STATUS (HCC): ICD-10-CM

## 2020-09-01 DIAGNOSIS — E27.40 ADRENAL INSUFFICIENCY (HCC): ICD-10-CM

## 2020-09-01 DIAGNOSIS — J44.9 CHRONIC OBSTRUCTIVE PULMONARY DISEASE, UNSPECIFIED COPD TYPE (HCC): ICD-10-CM

## 2020-09-01 DIAGNOSIS — Z12.31 VISIT FOR SCREENING MAMMOGRAM: ICD-10-CM

## 2020-09-01 DIAGNOSIS — Z13.31 DEPRESSION SCREENING: ICD-10-CM

## 2020-09-01 PROBLEM — M79.605 LEFT LEG PAIN: Status: RESOLVED | Noted: 2020-05-11 | Resolved: 2020-09-01

## 2020-09-01 PROBLEM — Z86.010 HISTORY OF COLON POLYPS: Status: RESOLVED | Noted: 2018-04-09 | Resolved: 2020-09-01

## 2020-09-01 PROBLEM — J06.9 VIRAL UPPER RESPIRATORY TRACT INFECTION: Status: RESOLVED | Noted: 2020-02-07 | Resolved: 2020-09-01

## 2020-09-01 PROBLEM — Z86.711 HISTORY OF PULMONARY EMBOLISM: Status: RESOLVED | Noted: 2020-01-15 | Resolved: 2020-09-01

## 2020-09-01 PROBLEM — D13.5 ADENOMYOMATOSIS OF GALLBLADDER: Status: RESOLVED | Noted: 2018-11-01 | Resolved: 2020-09-01

## 2020-09-01 PROBLEM — M79.7 FIBROMYALGIA: Status: RESOLVED | Noted: 2020-01-15 | Resolved: 2020-09-01

## 2020-09-01 PROBLEM — M17.0 PRIMARY OSTEOARTHRITIS OF BOTH KNEES: Status: RESOLVED | Noted: 2020-01-15 | Resolved: 2020-09-01

## 2020-09-01 PROBLEM — Z86.0100 HISTORY OF COLON POLYPS: Status: RESOLVED | Noted: 2018-04-09 | Resolved: 2020-09-01

## 2020-09-01 PROBLEM — M47.816 SPONDYLOSIS OF LUMBAR REGION WITHOUT MYELOPATHY OR RADICULOPATHY: Status: RESOLVED | Noted: 2020-01-15 | Resolved: 2020-09-01

## 2020-09-01 PROBLEM — E55.9 VITAMIN D DEFICIENCY: Status: RESOLVED | Noted: 2020-02-07 | Resolved: 2020-09-01

## 2020-09-01 LAB
ALBUMIN SERPL-MCNC: 3.5 G/DL (ref 3.4–5)
ALBUMIN/GLOB SERPL: 1 {RATIO} (ref 1–2)
ALP LIVER SERPL-CCNC: 52 U/L (ref 46–118)
ALT SERPL-CCNC: 19 U/L (ref 13–56)
ANION GAP SERPL CALC-SCNC: 8 MMOL/L (ref 0–18)
AST SERPL-CCNC: 16 U/L (ref 15–37)
BASOPHILS # BLD AUTO: 0.07 X10(3) UL (ref 0–0.2)
BASOPHILS NFR BLD AUTO: 0.5 %
BILIRUB SERPL-MCNC: 0.5 MG/DL (ref 0.1–2)
BUN BLD-MCNC: 14 MG/DL (ref 7–18)
BUN/CREAT SERPL: 11.4 (ref 10–20)
CALCIUM BLD-MCNC: 9.1 MG/DL (ref 8.5–10.1)
CHLORIDE SERPL-SCNC: 105 MMOL/L (ref 98–112)
CHOLEST SMN-MCNC: 179 MG/DL (ref ?–200)
CO2 SERPL-SCNC: 27 MMOL/L (ref 21–32)
CREAT BLD-MCNC: 1.23 MG/DL (ref 0.55–1.02)
CRP SERPL HS-MCNC: 7.41 MG/L (ref ?–3)
DEPRECATED RDW RBC AUTO: 52.1 FL (ref 35.1–46.3)
EOSINOPHIL # BLD AUTO: 0.13 X10(3) UL (ref 0–0.7)
EOSINOPHIL NFR BLD AUTO: 1 %
ERYTHROCYTE [DISTWIDTH] IN BLOOD BY AUTOMATED COUNT: 14.2 % (ref 11–15)
EST. AVERAGE GLUCOSE BLD GHB EST-MCNC: 117 MG/DL (ref 68–126)
GLOBULIN PLAS-MCNC: 3.5 G/DL (ref 2.8–4.4)
GLUCOSE BLD-MCNC: 110 MG/DL (ref 70–99)
HBA1C MFR BLD HPLC: 5.7 % (ref ?–5.7)
HCT VFR BLD AUTO: 45.4 % (ref 35–48)
HDLC SERPL-MCNC: 73 MG/DL (ref 40–59)
HGB BLD-MCNC: 14.3 G/DL (ref 12–16)
IMM GRANULOCYTES # BLD AUTO: 0.09 X10(3) UL (ref 0–1)
IMM GRANULOCYTES NFR BLD: 0.7 %
INR: 2.9 (ref 0.8–1.2)
LDLC SERPL CALC-MCNC: 65 MG/DL (ref ?–100)
LYMPHOCYTES # BLD AUTO: 1.31 X10(3) UL (ref 1–4)
LYMPHOCYTES NFR BLD AUTO: 10.1 %
M PROTEIN MFR SERPL ELPH: 7 G/DL (ref 6.4–8.2)
MCH RBC QN AUTO: 31.2 PG (ref 26–34)
MCHC RBC AUTO-ENTMCNC: 31.5 G/DL (ref 31–37)
MCV RBC AUTO: 99.1 FL (ref 80–100)
MONOCYTES # BLD AUTO: 0.87 X10(3) UL (ref 0.1–1)
MONOCYTES NFR BLD AUTO: 6.7 %
NEUTROPHILS # BLD AUTO: 10.54 X10 (3) UL (ref 1.5–7.7)
NEUTROPHILS # BLD AUTO: 10.54 X10(3) UL (ref 1.5–7.7)
NEUTROPHILS NFR BLD AUTO: 81 %
NONHDLC SERPL-MCNC: 106 MG/DL (ref ?–130)
OSMOLALITY SERPL CALC.SUM OF ELEC: 291 MOSM/KG (ref 275–295)
PLATELET # BLD AUTO: 223 10(3)UL (ref 150–450)
POTASSIUM SERPL-SCNC: 3.6 MMOL/L (ref 3.5–5.1)
RBC # BLD AUTO: 4.58 X10(6)UL (ref 3.8–5.3)
SODIUM SERPL-SCNC: 140 MMOL/L (ref 136–145)
TRIGL SERPL-MCNC: 206 MG/DL (ref 30–149)
VLDLC SERPL CALC-MCNC: 41 MG/DL (ref 0–30)
WBC # BLD AUTO: 13 X10(3) UL (ref 4–11)

## 2020-09-01 PROCEDURE — G0444 DEPRESSION SCREEN ANNUAL: HCPCS | Performed by: FAMILY MEDICINE

## 2020-09-01 PROCEDURE — 36415 COLL VENOUS BLD VENIPUNCTURE: CPT

## 2020-09-01 PROCEDURE — 80053 COMPREHEN METABOLIC PANEL: CPT

## 2020-09-01 PROCEDURE — 86141 C-REACTIVE PROTEIN HS: CPT

## 2020-09-01 PROCEDURE — 85610 PROTHROMBIN TIME: CPT | Performed by: FAMILY MEDICINE

## 2020-09-01 PROCEDURE — 83036 HEMOGLOBIN GLYCOSYLATED A1C: CPT

## 2020-09-01 PROCEDURE — 80061 LIPID PANEL: CPT

## 2020-09-01 PROCEDURE — 85025 COMPLETE CBC W/AUTO DIFF WBC: CPT

## 2020-09-01 PROCEDURE — G0439 PPPS, SUBSEQ VISIT: HCPCS | Performed by: FAMILY MEDICINE

## 2020-09-01 RX ORDER — FEXOFENADINE HCL 180 MG/1
180 TABLET ORAL DAILY
Qty: 90 TABLET | Refills: 0 | Status: SHIPPED | OUTPATIENT
Start: 2020-09-01

## 2020-09-01 NOTE — PROGRESS NOTES
HPI:   Rosalino Sosa is a 60year old female who presents for a Medicare Subsequent Annual Wellness visit (Pt already had Initial Annual Wellness).     No complaints  Her last annual assessment has been over 1 year: Annual Physical due on 07/24/2020 Team: Patient Care Team:  Ranulfo Jalloh DO as PCP - General (Family Practice)  Ranulfo Jalloh DO as PCP - Jeronimo Samayoa MD (RHEUMATOLOGY)    Patient Active Problem List:     HTN (hypertension)     COPD (chronic obstructive pulmonary d EVERY NIGHT  hydrochlorothiazide 25 MG Oral Tab, TAKE 1/2 TABLET BY MOUTH EVERY DAY  BENAZEPRIL HCL 20 MG Oral Tab, TAKE 1 TABLET BY MOUTH EVERY DAY  Hydroxychloroquine Sulfate 200 MG Oral Tab, TAKE 2 TABLETS BY MOUTH ONCE DAILY  Albuterol Sulfate A 108 colonoscopy. Her family history includes Cancer in her mother; Other in her sister; Ovarian Cancer (age of onset: 61) in her maternal aunt. SOCIAL HISTORY:   She  reports that she has been smoking cigarettes. She has a 20.00 pack-year smoking history. midline,mucosa normal, no drainage or sinus tenderness   Throat: Lips, mucosa, and tongue normal; teeth and gums normal   Neck: Supple, symmetrical, trachea midline, no adenopathy;  thyroid: not enlarged, symmetric, no tenderness/mass/nodules; no carotid b Essential hypertension  Controlled and stable    - PROTHROMBIN TIME  - COMP METABOLIC PANEL (14); Future  - LIPID PANEL; Future    9.  Systemic lupus erythematosus, unspecified SLE type, unspecified organ involvement status ([facility] [address]  Controlled and stable    - patient  PREVENTATIVE SERVICES  INDICATIONS AND SCHEDULE Internal Lab or Procedure External Lab or Procedure   Diabetes Screening      HbgA1C   Annually Lab Results   Component Value Date    A1C 5.8 (H) 11/14/2018    No flowsheet data found.     Fasting Blo Hepatitis B for Moderate/High Risk No vaccine history found Medium/high risk factors:   End-stage renal disease   Hemophiliacs who received Factor VIII or IX concentrates   Clients of institutions for the mentally retarded   Persons who live in the same [place]

## 2020-09-01 NOTE — PATIENT INSTRUCTIONS
Ildefonso Cassidy's SCREENING SCHEDULE   Tests on this list are recommended by your physician but may not be covered, or covered at this frequency, by your insurer. Please check with your insurance carrier before scheduling to verify coverage.    PREVENTATIVE every 10 years- more often if abnormal Colonoscopy due on 08/29/2028 Update [facility] if applicable    Flex Sigmoidoscopy Screen  Covered every 5 years No results found for this or any previous visit. No flowsheet data found.      Fecal Occult Bloo (Pneumovax)  Covered Once after 65 No orders found for this or any previous visit. Please get once after your 65th birthday    Hepatitis B for Moderate/High Risk       No orders found for this or any previous visit.  Medium/high risk factors:   End-stage re

## 2020-09-02 DIAGNOSIS — R73.01 IMPAIRED FASTING BLOOD SUGAR: ICD-10-CM

## 2020-09-02 DIAGNOSIS — E27.40 ADRENAL INSUFFICIENCY (HCC): ICD-10-CM

## 2020-09-02 DIAGNOSIS — I10 ESSENTIAL HYPERTENSION: Primary | ICD-10-CM

## 2020-09-15 RX ORDER — HYDROCODONE BITARTRATE AND ACETAMINOPHEN 5; 325 MG/1; MG/1
1-2 TABLET ORAL EVERY 4 HOURS PRN
Qty: 30 TABLET | Refills: 0 | Status: SHIPPED | OUTPATIENT
Start: 2020-09-15 | End: 2020-11-19

## 2020-09-17 RX ORDER — FOLIC ACID 1 MG/1
2 TABLET ORAL
COMMUNITY

## 2020-09-17 RX ORDER — FEXOFENADINE HCL 180 MG/1
180 TABLET ORAL
COMMUNITY
Start: 2020-09-01

## 2020-09-17 RX ORDER — CYCLOBENZAPRINE HCL 10 MG
TABLET ORAL
COMMUNITY
Start: 2020-08-26

## 2020-09-17 RX ORDER — ATENOLOL 25 MG/1
TABLET ORAL
COMMUNITY
Start: 2020-08-26

## 2020-09-17 RX ORDER — ALPRAZOLAM 0.5 MG/1
TABLET ORAL
COMMUNITY
Start: 2020-06-24

## 2020-09-17 RX ORDER — HYDROCODONE BITARTRATE AND ACETAMINOPHEN 5; 325 MG/1; MG/1
1-2 TABLET ORAL
COMMUNITY
Start: 2020-09-15

## 2020-09-17 RX ORDER — HYDROXYCHLOROQUINE SULFATE 200 MG/1
TABLET, FILM COATED ORAL
COMMUNITY
Start: 2020-05-11

## 2020-09-17 RX ORDER — PREDNISONE 20 MG/1
10 TABLET ORAL
COMMUNITY
Start: 2020-06-29

## 2020-09-17 RX ORDER — HYDROCHLOROTHIAZIDE 25 MG/1
TABLET ORAL
COMMUNITY
Start: 2020-06-04

## 2020-09-17 RX ORDER — BENAZEPRIL HYDROCHLORIDE 20 MG/1
TABLET ORAL
COMMUNITY
Start: 2020-05-28

## 2020-09-17 RX ORDER — WARFARIN SODIUM 10 MG/1
TABLET ORAL
COMMUNITY
Start: 2020-06-11

## 2020-09-18 ENCOUNTER — OFFICE VISIT (OUTPATIENT)
Dept: CARDIOLOGY | Age: 60
End: 2020-09-18

## 2020-09-18 VITALS
HEART RATE: 68 BPM | BODY MASS INDEX: 42.53 KG/M2 | DIASTOLIC BLOOD PRESSURE: 72 MMHG | SYSTOLIC BLOOD PRESSURE: 116 MMHG | HEIGHT: 68 IN | WEIGHT: 280.6 LBS

## 2020-09-18 DIAGNOSIS — I26.99 OTHER ACUTE PULMONARY EMBOLISM WITHOUT ACUTE COR PULMONALE (CMD): ICD-10-CM

## 2020-09-18 DIAGNOSIS — J43.8 OTHER EMPHYSEMA (CMD): ICD-10-CM

## 2020-09-18 DIAGNOSIS — N18.30 CKD (CHRONIC KIDNEY DISEASE) STAGE 3, GFR 30-59 ML/MIN (CMD): ICD-10-CM

## 2020-09-18 DIAGNOSIS — I10 ESSENTIAL HYPERTENSION: Primary | ICD-10-CM

## 2020-09-18 DIAGNOSIS — R06.09 DYSPNEA ON EFFORT: ICD-10-CM

## 2020-09-18 DIAGNOSIS — R07.2 PRECORDIAL PAIN: ICD-10-CM

## 2020-09-18 PROBLEM — J44.9 COPD (CHRONIC OBSTRUCTIVE PULMONARY DISEASE) (CMD): Status: ACTIVE | Noted: 2020-09-18

## 2020-09-18 PROBLEM — E66.01 CLASS 3 SEVERE OBESITY DUE TO EXCESS CALORIES WITH SERIOUS COMORBIDITY AND BODY MASS INDEX (BMI) OF 45.0 TO 49.9 IN ADULT (CMD): Status: ACTIVE | Noted: 2020-01-15

## 2020-09-18 PROCEDURE — 99205 OFFICE O/P NEW HI 60 MIN: CPT | Performed by: INTERNAL MEDICINE

## 2020-09-18 RX ORDER — WARFARIN SODIUM 4 MG/1
TABLET ORAL
COMMUNITY
Start: 2020-08-26

## 2020-09-18 ASSESSMENT — ENCOUNTER SYMPTOMS
BRUISES/BLEEDS EASILY: 0
SUSPICIOUS LESIONS: 0
CHILLS: 0
HEMOPTYSIS: 0
ALLERGIC/IMMUNOLOGIC COMMENTS: NO NEW FOOD ALLERGIES
WEIGHT GAIN: 0
COUGH: 0
HEMATOCHEZIA: 0
FEVER: 0
WEIGHT LOSS: 0

## 2020-09-18 ASSESSMENT — PATIENT HEALTH QUESTIONNAIRE - PHQ9
CLINICAL INTERPRETATION OF PHQ2 SCORE: NO FURTHER SCREENING NEEDED
2. FEELING DOWN, DEPRESSED OR HOPELESS: NOT AT ALL
SUM OF ALL RESPONSES TO PHQ9 QUESTIONS 1 AND 2: 0
1. LITTLE INTEREST OR PLEASURE IN DOING THINGS: NOT AT ALL
SUM OF ALL RESPONSES TO PHQ9 QUESTIONS 1 AND 2: 0
CLINICAL INTERPRETATION OF PHQ9 SCORE: NO FURTHER SCREENING NEEDED

## 2020-09-26 RX ORDER — ALPRAZOLAM 0.5 MG/1
TABLET ORAL
Qty: 90 TABLET | Refills: 0 | Status: SHIPPED | OUTPATIENT
Start: 2020-09-26 | End: 2020-10-26

## 2020-09-26 NOTE — TELEPHONE ENCOUNTER
Last refill: 08/26/20  Qty: 90  W. 0 refills  Last ov: 09/01/20    Requested Prescriptions     Pending Prescriptions Disp Refills   • ALPRAZOLAM 0.5 MG Oral Tab [Pharmacy Med Name: ALPRAZOLAM 0.5MG TABLETS] 90 tablet 0     Sig: TAKE 1 TABLET BY MOUTH THREE

## 2020-09-30 ENCOUNTER — HOSPITAL ENCOUNTER (OUTPATIENT)
Dept: CV DIAGNOSTICS | Facility: HOSPITAL | Age: 60
Discharge: HOME | End: 2020-09-30
Attending: CLINICAL NURSE SPECIALIST
Payer: MEDICARE

## 2020-09-30 DIAGNOSIS — R06.00 DYSPNEA ON EFFORT: ICD-10-CM

## 2020-09-30 DIAGNOSIS — I10 HYPERTENSION, ESSENTIAL: ICD-10-CM

## 2020-09-30 DIAGNOSIS — I26.99 OTHER ACUTE PULMONARY EMBOLISM WITHOUT ACUTE COR PULMONALE (HCC): ICD-10-CM

## 2020-09-30 DIAGNOSIS — R07.2 PRECORDIAL PAIN: ICD-10-CM

## 2020-09-30 PROCEDURE — 93306 TTE W/DOPPLER COMPLETE: CPT | Performed by: INTERNAL MEDICINE

## 2020-10-02 ENCOUNTER — TELEPHONE (OUTPATIENT)
Dept: CARDIOLOGY | Age: 60
End: 2020-10-02

## 2020-10-15 ENCOUNTER — TELEPHONE (OUTPATIENT)
Dept: FAMILY MEDICINE CLINIC | Facility: CLINIC | Age: 60
End: 2020-10-15

## 2020-10-15 RX ORDER — PREDNISONE 20 MG/1
TABLET ORAL
Qty: 90 TABLET | Refills: 0 | Status: SHIPPED | OUTPATIENT
Start: 2020-10-15 | End: 2020-12-29

## 2020-10-15 NOTE — TELEPHONE ENCOUNTER
SHE WANTS YOU TO KNOW THAT SHE INCREASED HER PREDNISONE DUE TO HER ALLERGIES AND THAT SHE MIGHT NEED ANOTHER REFILL SOONER THAN SHE SHOULD

## 2020-10-26 ENCOUNTER — TELEPHONE (OUTPATIENT)
Dept: FAMILY MEDICINE CLINIC | Facility: CLINIC | Age: 60
End: 2020-10-26

## 2020-10-26 RX ORDER — ALPRAZOLAM 0.5 MG/1
0.5 TABLET ORAL NIGHTLY PRN
Qty: 90 TABLET | Refills: 0 | Status: CANCELLED | OUTPATIENT
Start: 2020-10-26

## 2020-10-26 RX ORDER — ALPRAZOLAM 0.5 MG/1
TABLET ORAL
Qty: 90 TABLET | Refills: 0 | Status: SHIPPED | OUTPATIENT
Start: 2020-10-26 | End: 2020-12-19 | Stop reason: DRUGHIGH

## 2020-10-26 NOTE — TELEPHONE ENCOUNTER
Last refill: 09/26/20  Qty: 90  W/ 0 refills  Last ov: 09/01/20    Requested Prescriptions     Pending Prescriptions Disp Refills   • ALPRAZOLAM 0.5 MG Oral Tab [Pharmacy Med Name: ALPRAZOLAM 0.5MG TABLETS] 90 tablet 0     Sig: TAKE 1 TABLET BY MOUTH THREE

## 2020-10-26 NOTE — TELEPHONE ENCOUNTER
XANAX, GENERIC         [facility] [place]   SHE IS HOPING TO GET THAT TOMORROW [place]

## 2020-10-27 ENCOUNTER — TELEPHONE (OUTPATIENT)
Dept: FAMILY MEDICINE CLINIC | Facility: CLINIC | Age: 60
End: 2020-10-27

## 2020-10-27 RX ORDER — ALPRAZOLAM 1 MG/1
TABLET ORAL
Qty: 45 TABLET | Refills: 0 | Status: SHIPPED | OUTPATIENT
Start: 2020-10-27 | End: 2020-11-25

## 2020-10-27 NOTE — TELEPHONE ENCOUNTER
ALPRAZolam 1 MG Oral Tab   keira telling pt.  This is out of stock she is asking for this to be called to [facility]

## 2020-10-27 NOTE — TELEPHONE ENCOUNTER
Alprazolam 0.5mg is on back order at [facility]. Can 1mg tabs be ordered instead? Pt will need to cut them in half.      LOV: 9/1/20    LAST LAB: n/a    LAST RX: 9/26/20    Next OV:   Future Appointments   Date Time Provider Meredith Jeter   11/12/2020 10

## 2020-11-04 ENCOUNTER — TELEPHONE (OUTPATIENT)
Dept: FAMILY MEDICINE CLINIC | Facility: CLINIC | Age: 60
End: 2020-11-04

## 2020-11-04 NOTE — TELEPHONE ENCOUNTER
Requesting letter to be excused from jury duty d/t health condition/ COPD.      PSR left msg for pt to call back for INR appt on 11/5

## 2020-11-04 NOTE — TELEPHONE ENCOUNTER
Pt wants to come for her inr tomorrow, no openings, call pt. She also wants to know if Janice Rebolledo will write a letter excusing her from jury duty due to her lung condition.

## 2020-11-04 NOTE — TELEPHONE ENCOUNTER
Future Appointments   Date Time Provider Meredith Jeter   11/5/2020  2:00 PM [facility] NURSE [facility] [place]   05/66/2358 65:79 AM Jerod Watkins MD [facility] Jennifer Syed     Pt advised, will be picking up letter. INR scheduled.

## 2020-11-05 ENCOUNTER — ANTI-COAG VISIT (OUTPATIENT)
Dept: FAMILY MEDICINE CLINIC | Facility: CLINIC | Age: 60
End: 2020-11-05
Payer: MEDICARE

## 2020-11-05 DIAGNOSIS — I26.99 PULMONARY EMBOLISM, OTHER, UNSPECIFIED CHRONICITY, UNSPECIFIED WHETHER ACUTE COR PULMONALE PRESENT (HCC): ICD-10-CM

## 2020-11-05 PROBLEM — R06.09 DYSPNEA ON EFFORT: Status: ACTIVE | Noted: 2020-09-18

## 2020-11-05 PROBLEM — R06.00 DYSPNEA ON EFFORT: Status: ACTIVE | Noted: 2020-09-18

## 2020-11-05 PROCEDURE — 85610 PROTHROMBIN TIME: CPT | Performed by: FAMILY MEDICINE

## 2020-11-05 PROCEDURE — 93793 ANTICOAG MGMT PT WARFARIN: CPT | Performed by: FAMILY MEDICINE

## 2020-11-05 RX ORDER — WARFARIN SODIUM 10 MG/1
TABLET ORAL
Qty: 90 TABLET | Refills: 0 | COMMUNITY
Start: 2020-11-05 | End: 2020-12-29

## 2020-11-12 ENCOUNTER — ANTI-COAG VISIT (OUTPATIENT)
Dept: FAMILY MEDICINE CLINIC | Facility: CLINIC | Age: 60
End: 2020-11-12
Payer: MEDICARE

## 2020-11-12 ENCOUNTER — OFFICE VISIT (OUTPATIENT)
Dept: RHEUMATOLOGY | Facility: CLINIC | Age: 60
End: 2020-11-12
Payer: MEDICARE

## 2020-11-12 VITALS
SYSTOLIC BLOOD PRESSURE: 118 MMHG | DIASTOLIC BLOOD PRESSURE: 70 MMHG | TEMPERATURE: 97 F | WEIGHT: 280 LBS | HEIGHT: 68 IN | BODY MASS INDEX: 42.44 KG/M2 | RESPIRATION RATE: 16 BRPM

## 2020-11-12 DIAGNOSIS — J44.9 CHRONIC OBSTRUCTIVE PULMONARY DISEASE, UNSPECIFIED COPD TYPE (HCC): ICD-10-CM

## 2020-11-12 DIAGNOSIS — M32.9 SYSTEMIC LUPUS ERYTHEMATOSUS, UNSPECIFIED SLE TYPE, UNSPECIFIED ORGAN INVOLVEMENT STATUS (HCC): Primary | ICD-10-CM

## 2020-11-12 DIAGNOSIS — E66.01 CLASS 3 SEVERE OBESITY DUE TO EXCESS CALORIES WITH SERIOUS COMORBIDITY AND BODY MASS INDEX (BMI) OF 40.0 TO 44.9 IN ADULT (HCC): ICD-10-CM

## 2020-11-12 PROBLEM — E66.813 CLASS 3 SEVERE OBESITY DUE TO EXCESS CALORIES WITH SERIOUS COMORBIDITY AND BODY MASS INDEX (BMI) OF 40.0 TO 44.9 IN ADULT (HCC): Status: ACTIVE | Noted: 2020-01-15

## 2020-11-12 PROBLEM — E66.813 CLASS 3 SEVERE OBESITY DUE TO EXCESS CALORIES WITH SERIOUS COMORBIDITY AND BODY MASS INDEX (BMI) OF 40.0 TO 44.9 IN ADULT: Status: ACTIVE | Noted: 2020-01-15

## 2020-11-12 PROCEDURE — 99214 OFFICE O/P EST MOD 30 MIN: CPT | Performed by: INTERNAL MEDICINE

## 2020-11-12 PROCEDURE — 85610 PROTHROMBIN TIME: CPT | Performed by: FAMILY MEDICINE

## 2020-11-12 RX ORDER — HYDROXYCHLOROQUINE SULFATE 200 MG/1
TABLET, FILM COATED ORAL
Qty: 180 TABLET | Refills: 3 | Status: SHIPPED | OUTPATIENT
Start: 2020-11-12 | End: 2021-05-13

## 2020-11-12 RX ORDER — BUDESONIDE 90 UG/1
1-2 AEROSOL, POWDER RESPIRATORY (INHALATION) 2 TIMES DAILY
Qty: 1 INHALER | Refills: 1 | Status: SHIPPED | OUTPATIENT
Start: 2020-11-12 | End: 2021-09-22 | Stop reason: ALTCHOICE

## 2020-11-12 NOTE — PROGRESS NOTES
[facility]  Dr. Michelle Booth Progress Note     Subjective:   Jillian Lagos is a(n) 60year old female. Current complaints: Patient presents with:  SLE: Patient states she is doing \"fine. \"  Patient denies any lupus flares.  Taking tylenol and flexeril for unspecified copd type (hcc)   Lupus stable. Plan:   Patient Instructions   Continue use of Plaquenil 200 mg tablets 2 daily for lupus. Use prednisone for both lupus and COPD. Try to use the lowest amount of prednisone you can get by.   When your breathin

## 2020-11-12 NOTE — PATIENT INSTRUCTIONS
Continue use of Plaquenil 200 mg tablets 2 daily for lupus. Use prednisone for both lupus and COPD. Try to use the lowest amount of prednisone you can get by. When your breathing is bad sometimes you have to increase the prednisone.   Currently you are o

## 2020-11-12 NOTE — PROGRESS NOTES
Call placed to patient and instructed her to take her normal dose shagufta and Dr. Pee Lobato nurse will call her tomorrow with recommendations. She verbalized understanding.

## 2020-11-19 RX ORDER — CYCLOBENZAPRINE HCL 10 MG
10 TABLET ORAL 3 TIMES DAILY PRN
Qty: 90 TABLET | Refills: 0 | Status: SHIPPED | OUTPATIENT
Start: 2020-11-19 | End: 2021-02-22

## 2020-11-19 RX ORDER — HYDROCODONE BITARTRATE AND ACETAMINOPHEN 5; 325 MG/1; MG/1
1-2 TABLET ORAL EVERY 4 HOURS PRN
Qty: 30 TABLET | Refills: 0 | Status: SHIPPED | OUTPATIENT
Start: 2020-11-19 | End: 2021-01-19

## 2020-11-19 NOTE — TELEPHONE ENCOUNTER
LOV 9-1-20    LAST LAB 9-1-20    LAST RX 9-15-20 #30    Next OV   Future Appointments   Date Time Provider Meredith Jeter   9/23/7140 34:91 AM Maria M Rodriguez MD [facility] Bryanna Chaves         PROTOCOL  NONE

## 2020-11-25 RX ORDER — ALPRAZOLAM 1 MG/1
TABLET ORAL
Qty: 45 TABLET | Refills: 0 | Status: SHIPPED | OUTPATIENT
Start: 2020-11-25 | End: 2020-12-21

## 2020-11-25 NOTE — TELEPHONE ENCOUNTER
Last office visit: 9/1/20  Last refill: 10/27/20 Qty: 45  Labs Due: 9/2/21  Future Appointments   Date Time Provider Meredith Jeter   2/35/5880 28:23 AM Yordan Mora MD [facility] Lyn Epp      ALPRAZolam 1 MG Oral Tab         Sig: Take 1/2 tablet

## 2020-12-19 DIAGNOSIS — I10 ESSENTIAL HYPERTENSION: Primary | ICD-10-CM

## 2020-12-19 NOTE — TELEPHONE ENCOUNTER
refill generic valium, water pill, atenelolol    call to [facility] in [place] for  on wednesday 12/23    last ov 9/01/2020

## 2020-12-19 NOTE — TELEPHONE ENCOUNTER
Alprazolam last filled 11/25/2020  #45   Brain Cooler refers to this drug as generic Valium because she can't pronounce alprazolam.    Future Appointments   Date Time Provider Meredith Jeter   12/23/2020  2:00 PM [facility] NURSE CORWIN Perdomo   5/13/202

## 2020-12-21 RX ORDER — ATENOLOL 25 MG/1
25 TABLET ORAL DAILY
Qty: 90 TABLET | Refills: 0 | Status: SHIPPED | OUTPATIENT
Start: 2020-12-21 | End: 2021-03-02

## 2020-12-21 RX ORDER — HYDROCHLOROTHIAZIDE 25 MG/1
TABLET ORAL
Qty: 90 TABLET | Refills: 0 | Status: SHIPPED | OUTPATIENT
Start: 2020-12-21 | End: 2021-06-09

## 2020-12-21 RX ORDER — ALPRAZOLAM 1 MG/1
TABLET ORAL
Qty: 45 TABLET | Refills: 0 | Status: SHIPPED | OUTPATIENT
Start: 2020-12-21 | End: 2021-01-21

## 2020-12-29 ENCOUNTER — OFFICE VISIT (OUTPATIENT)
Dept: FAMILY MEDICINE CLINIC | Facility: CLINIC | Age: 60
End: 2020-12-29
Payer: MEDICARE

## 2020-12-29 ENCOUNTER — TELEPHONE (OUTPATIENT)
Dept: FAMILY MEDICINE CLINIC | Facility: CLINIC | Age: 60
End: 2020-12-29

## 2020-12-29 VITALS
DIASTOLIC BLOOD PRESSURE: 80 MMHG | OXYGEN SATURATION: 96 % | TEMPERATURE: 98 F | SYSTOLIC BLOOD PRESSURE: 136 MMHG | HEART RATE: 70 BPM

## 2020-12-29 DIAGNOSIS — J44.1 CHRONIC OBSTRUCTIVE PULMONARY DISEASE WITH ACUTE EXACERBATION (HCC): ICD-10-CM

## 2020-12-29 DIAGNOSIS — Z79.01 LONG TERM (CURRENT) USE OF ANTICOAGULANTS: ICD-10-CM

## 2020-12-29 DIAGNOSIS — J44.1 CHRONIC OBSTRUCTIVE PULMONARY DISEASE WITH ACUTE EXACERBATION (HCC): Primary | ICD-10-CM

## 2020-12-29 DIAGNOSIS — M32.9 SYSTEMIC LUPUS ERYTHEMATOSUS, UNSPECIFIED SLE TYPE, UNSPECIFIED ORGAN INVOLVEMENT STATUS (HCC): ICD-10-CM

## 2020-12-29 DIAGNOSIS — I10 ESSENTIAL HYPERTENSION: ICD-10-CM

## 2020-12-29 PROCEDURE — 99214 OFFICE O/P EST MOD 30 MIN: CPT | Performed by: FAMILY MEDICINE

## 2020-12-29 PROCEDURE — 85610 PROTHROMBIN TIME: CPT | Performed by: FAMILY MEDICINE

## 2020-12-29 RX ORDER — WARFARIN SODIUM 10 MG/1
5 TABLET ORAL NIGHTLY
Qty: 90 TABLET | Refills: 0 | COMMUNITY
Start: 2020-12-31 | End: 2021-02-02

## 2020-12-29 RX ORDER — CLARITHROMYCIN 500 MG/1
500 TABLET, COATED ORAL 2 TIMES DAILY
Qty: 7 TABLET | Refills: 0 | Status: SHIPPED | OUTPATIENT
Start: 2020-12-29 | End: 2021-01-05

## 2020-12-29 RX ORDER — PREDNISONE 20 MG/1
TABLET ORAL
Qty: 90 TABLET | Refills: 0 | Status: SHIPPED | OUTPATIENT
Start: 2020-12-29 | End: 2021-07-20

## 2020-12-29 NOTE — PROGRESS NOTES
HPI:    Patient ID: Kellie Sanchez is a 63year old female. She states for 2 weeks has not been feeling well. Initially sinus symptoms, sore throat.   Now all in her chest.  Has a tendency to have difficulties with infections in her chest.  Positive fatigue Inhaler 2   • STIOLTO RESPIMAT 2.5-2.5 MCG/ACT Inhalation Aero Soln INL 2 PFS PO QD  3   • predniSONE 10 MG Oral Tab 4 tabs daily 5 days 2 tab daily 5 days 1 tab 5 days (Patient taking differently: 10 mg. 4 tabs daily 5 days 2 tab daily 5 days 1 tab 5 days Continue other medications as directed. Call with questions or problems. Worsening symptoms to the emergency room.          ASSESSMENT/PLAN:   Long term (current) use of anticoagulants  Chronic obstructive pulmonary disease with acute exacerbation (hcc)

## 2020-12-29 NOTE — PATIENT INSTRUCTIONS
PATIENT INSTRUCTED AT APPOINTMENT TO HOLD COUMADIN FOR 2 DAYS; THEN RESUME DOSE AT 5 MG PO NIGHTLY AND RECHECK INR IN 7 TO 10 DAYS.

## 2020-12-29 NOTE — TELEPHONE ENCOUNTER
clarithromycin 500 MG Oral Tab script is written for 7 tabs but says  1 tab for twice a day for 7 days, should this be for 14 tabs?   Please call

## 2021-01-06 ENCOUNTER — HOSPITAL ENCOUNTER (OUTPATIENT)
Dept: GENERAL RADIOLOGY | Age: 61
Discharge: HOME | End: 2021-01-06
Attending: FAMILY MEDICINE
Payer: MEDICARE

## 2021-01-06 ENCOUNTER — TELEPHONE (OUTPATIENT)
Dept: FAMILY MEDICINE CLINIC | Facility: CLINIC | Age: 61
End: 2021-01-06

## 2021-01-06 ENCOUNTER — OFFICE VISIT (OUTPATIENT)
Dept: FAMILY MEDICINE CLINIC | Facility: CLINIC | Age: 61
End: 2021-01-06
Payer: MEDICARE

## 2021-01-06 VITALS
WEIGHT: 288 LBS | OXYGEN SATURATION: 95 % | BODY MASS INDEX: 43.65 KG/M2 | RESPIRATION RATE: 22 BRPM | TEMPERATURE: 98 F | SYSTOLIC BLOOD PRESSURE: 122 MMHG | HEIGHT: 68 IN | HEART RATE: 79 BPM | DIASTOLIC BLOOD PRESSURE: 60 MMHG

## 2021-01-06 DIAGNOSIS — J44.1 CHRONIC OBSTRUCTIVE PULMONARY DISEASE WITH ACUTE EXACERBATION (HCC): ICD-10-CM

## 2021-01-06 DIAGNOSIS — J44.1 CHRONIC OBSTRUCTIVE PULMONARY DISEASE WITH ACUTE EXACERBATION (HCC): Primary | ICD-10-CM

## 2021-01-06 LAB — INR: 1.3 (ref 0.8–1.2)

## 2021-01-06 PROCEDURE — 99214 OFFICE O/P EST MOD 30 MIN: CPT | Performed by: FAMILY MEDICINE

## 2021-01-06 PROCEDURE — 85610 PROTHROMBIN TIME: CPT | Performed by: FAMILY MEDICINE

## 2021-01-06 PROCEDURE — 71046 X-RAY EXAM CHEST 2 VIEWS: CPT | Performed by: FAMILY MEDICINE

## 2021-01-06 RX ORDER — LORAZEPAM 0.5 MG/1
0.5 TABLET ORAL
COMMUNITY

## 2021-01-06 RX ORDER — CEFPROZIL 250 MG/1
250 TABLET, FILM COATED ORAL 2 TIMES DAILY
Qty: 14 TABLET | Refills: 0 | Status: SHIPPED | OUTPATIENT
Start: 2021-01-06 | End: 2021-01-13

## 2021-01-06 RX ORDER — WARFARIN SODIUM 1 MG/1
1 TABLET ORAL NIGHTLY
Qty: 30 TABLET | Refills: 0 | Status: SHIPPED | OUTPATIENT
Start: 2021-01-06 | End: 2021-02-02

## 2021-01-06 NOTE — PROGRESS NOTES
Alyne Oppenheim is a 63year old female. Patient presents with: Follow - Up: Room 2-  INR check      HPI:   Patient was seen in the respiratory clinic on December 29. She tested negative for Covid. She would diagnosed with acute exacerbation of COPD.   She Sulfate  (90 Base) MCG/ACT Inhalation Aero Soln, Inhale 2 puffs into the lungs every 6 (six) hours as needed for Wheezing., Disp: 1 Inhaler, Rfl: 2    •  STIOLTO RESPIMAT 2.5-2.5 MCG/ACT Inhalation Aero Soln, INL 2 PFS PO QD, Disp: , Rfl: 3    •  pr Visual impairment     glasses for reading   • Warfarin anticoagulation 7/19/2017     Past Surgical History:   Procedure Laterality Date   • CHOLECYSTECTOMY      2018, [place]   • COLONOSCOPY     • LAPAROSCOPIC CHOLECYSTECTOMY N/A 12/10/2018    Performed by Meka Cano Diffuse rhonchi, poor air exchange  CARDIO: RRR without murmur  ABD soft, nontender, normal BS, no masses, rebound or guarding. No organomegaly or CVA tenderness.   EXTREMITIES: no edema          ASSESSMENT AND PLAN:     Chronic obstructive pulmonary diseas

## 2021-01-19 RX ORDER — HYDROCODONE BITARTRATE AND ACETAMINOPHEN 5; 325 MG/1; MG/1
1-2 TABLET ORAL EVERY 4 HOURS PRN
Qty: 30 TABLET | Refills: 0 | Status: SHIPPED | OUTPATIENT
Start: 2021-01-19 | End: 2021-03-19

## 2021-01-19 NOTE — TELEPHONE ENCOUNTER
LOV: 1/6/21    LAST LAB: n/a    LAST RX: 11/19/20    Next OV:   Future Appointments   Date Time Provider Meredith Jeter   5/00/4182 58:02 AM Brittni Nagy MD [facility] Annie Alejandre       PROTOCOL: n/a

## 2021-01-21 ENCOUNTER — TELEPHONE (OUTPATIENT)
Dept: FAMILY MEDICINE CLINIC | Facility: CLINIC | Age: 61
End: 2021-01-21

## 2021-01-21 RX ORDER — BENAZEPRIL HYDROCHLORIDE 20 MG/1
20 TABLET ORAL DAILY
Qty: 180 TABLET | Refills: 0 | Status: SHIPPED | OUTPATIENT
Start: 2021-01-21 | End: 2021-04-19

## 2021-01-21 RX ORDER — ALPRAZOLAM 1 MG/1
TABLET ORAL
Qty: 45 TABLET | Refills: 0 | Status: SHIPPED | OUTPATIENT
Start: 2021-01-21 | End: 2021-02-22

## 2021-01-21 NOTE — TELEPHONE ENCOUNTER
ALPRAZOLAM    SHE WANTS IT SENT TO THE PHARMACY ASAP BECAUSE SHE WILL BE [place]AV[place]. SHE SAID SHE HAS REQUESTED THIS RECENTLY BUT THE PHARMACY STILL DOES NOT HAVE THE REQUEST

## 2021-01-21 NOTE — TELEPHONE ENCOUNTER
Last refill #180 on 5/28/2020  Last office visit pertaining to refill on 12/29/2020  Future Appointments   Date Time Provider Meredith Jeter   5/44/0230 89:19 AM Jackeline Fowler MD [facility]     BP Readings from Last 3 Encounters:  01/06/21

## 2021-02-02 ENCOUNTER — ANTI-COAG VISIT (OUTPATIENT)
Dept: FAMILY MEDICINE CLINIC | Facility: CLINIC | Age: 61
End: 2021-02-02
Payer: MEDICARE

## 2021-02-02 LAB — INR: 1.4 (ref 0.8–1.2)

## 2021-02-02 PROCEDURE — 85610 PROTHROMBIN TIME: CPT | Performed by: FAMILY MEDICINE

## 2021-02-02 PROCEDURE — 93793 ANTICOAG MGMT PT WARFARIN: CPT | Performed by: FAMILY MEDICINE

## 2021-02-02 RX ORDER — WARFARIN SODIUM 10 MG/1
5 TABLET ORAL NIGHTLY
Qty: 90 TABLET | Refills: 0 | COMMUNITY
Start: 2021-02-02 | End: 2021-02-10

## 2021-02-02 RX ORDER — WARFARIN SODIUM 1 MG/1
2 TABLET ORAL NIGHTLY
Qty: 60 TABLET | Refills: 0 | COMMUNITY
Start: 2021-02-02 | End: 2021-02-10

## 2021-02-10 ENCOUNTER — ANTI-COAG VISIT (OUTPATIENT)
Dept: FAMILY MEDICINE CLINIC | Facility: CLINIC | Age: 61
End: 2021-02-10
Payer: MEDICARE

## 2021-02-10 LAB — INR: 1.6 (ref 0.8–1.2)

## 2021-02-10 PROCEDURE — 85610 PROTHROMBIN TIME: CPT | Performed by: FAMILY MEDICINE

## 2021-02-10 PROCEDURE — 93793 ANTICOAG MGMT PT WARFARIN: CPT | Performed by: FAMILY MEDICINE

## 2021-02-10 RX ORDER — WARFARIN SODIUM 10 MG/1
5 TABLET ORAL NIGHTLY
Qty: 90 TABLET | Refills: 0 | COMMUNITY
Start: 2021-02-10 | End: 2021-03-04

## 2021-02-10 RX ORDER — WARFARIN SODIUM 1 MG/1
3 TABLET ORAL NIGHTLY
Qty: 60 TABLET | Refills: 0 | COMMUNITY
Start: 2021-02-10 | End: 2021-02-17

## 2021-02-17 ENCOUNTER — ANTI-COAG VISIT (OUTPATIENT)
Dept: FAMILY MEDICINE CLINIC | Facility: CLINIC | Age: 61
End: 2021-02-17
Payer: MEDICARE

## 2021-02-17 LAB — INR: 1.6 (ref 0.8–1.2)

## 2021-02-17 PROCEDURE — 93793 ANTICOAG MGMT PT WARFARIN: CPT | Performed by: FAMILY MEDICINE

## 2021-02-17 PROCEDURE — 85610 PROTHROMBIN TIME: CPT | Performed by: FAMILY MEDICINE

## 2021-02-17 RX ORDER — WARFARIN SODIUM 1 MG/1
4 TABLET ORAL NIGHTLY
Qty: 60 TABLET | Refills: 0 | COMMUNITY
Start: 2021-02-17 | End: 2021-03-04 | Stop reason: DRUGHIGH

## 2021-02-22 RX ORDER — ALPRAZOLAM 1 MG/1
TABLET ORAL
Qty: 45 TABLET | Refills: 0 | Status: SHIPPED | OUTPATIENT
Start: 2021-02-22 | End: 2021-03-19

## 2021-02-22 RX ORDER — CYCLOBENZAPRINE HCL 10 MG
10 TABLET ORAL 3 TIMES DAILY PRN
Qty: 90 TABLET | Refills: 0 | Status: SHIPPED | OUTPATIENT
Start: 2021-02-22 | End: 2021-04-19

## 2021-02-22 NOTE — TELEPHONE ENCOUNTER
Last Office- 1/6/21    Last Labs- 9/1/20    Last Refill- Cyclo-11/19/20 90 qty 0 refill    Alpraz-1/21/21  30 Qty 0 refill    Ventolin 12/14/18 no refill      Future Appt- 2/24/21

## 2021-02-24 ENCOUNTER — ANTI-COAG VISIT (OUTPATIENT)
Dept: FAMILY MEDICINE CLINIC | Facility: CLINIC | Age: 61
End: 2021-02-24
Payer: MEDICARE

## 2021-02-24 LAB — INR: 2.1 (ref 0.8–1.2)

## 2021-02-24 PROCEDURE — 93793 ANTICOAG MGMT PT WARFARIN: CPT | Performed by: FAMILY MEDICINE

## 2021-02-24 PROCEDURE — 85610 PROTHROMBIN TIME: CPT | Performed by: FAMILY MEDICINE

## 2021-03-02 DIAGNOSIS — I10 ESSENTIAL HYPERTENSION: ICD-10-CM

## 2021-03-02 RX ORDER — WARFARIN SODIUM 10 MG/1
5 TABLET ORAL NIGHTLY
Qty: 90 TABLET | Refills: 0 | Status: CANCELLED | OUTPATIENT
Start: 2021-03-02

## 2021-03-02 RX ORDER — ATENOLOL 25 MG/1
25 TABLET ORAL DAILY
Qty: 90 TABLET | Refills: 0 | Status: SHIPPED | OUTPATIENT
Start: 2021-03-02 | End: 2021-06-09

## 2021-03-02 NOTE — TELEPHONE ENCOUNTER
Hypertension Medications Protocol [id number]/02/2021 11:57 AM   CMP or BMP in past 12 months Protocol Details    Last serum creatinine< 2.0     Appointment in past 6 or next 3 months      Last refill of Atenolol - 12/21/20 - #90   Last CMP - 9/1/20 - creatinine - 1.23  Last office visit - 1/6/21  Refilled per protocol    Last refill of Warfarin - 2/2/21 -#90   Unable to leave message to verify if patient requested refill.

## 2021-03-04 ENCOUNTER — TELEPHONE (OUTPATIENT)
Dept: FAMILY MEDICINE CLINIC | Facility: CLINIC | Age: 61
End: 2021-03-04

## 2021-03-04 RX ORDER — WARFARIN SODIUM 4 MG/1
TABLET ORAL
Qty: 90 TABLET | Refills: 0 | Status: SHIPPED | OUTPATIENT
Start: 2021-03-04 | End: 2021-06-08

## 2021-03-04 RX ORDER — WARFARIN SODIUM 10 MG/1
TABLET ORAL
Qty: 45 TABLET | Refills: 0 | Status: SHIPPED | OUTPATIENT
Start: 2021-03-04 | End: 2021-06-08 | Stop reason: DRUGHIGH

## 2021-03-04 NOTE — TELEPHONE ENCOUNTER
Last OV 1/6/21  Last INR done on 2/24/21    Pt taking warfarin 9mg at bedtime. It appears she is taking a 1/2 of 10mg tabs (5mg) and 4  1 mg tabs, but is asking for 4 mg tabs now.

## 2021-03-17 DIAGNOSIS — Z23 NEED FOR VACCINATION: ICD-10-CM

## 2021-03-19 RX ORDER — ALPRAZOLAM 1 MG/1
TABLET ORAL
Qty: 45 TABLET | Refills: 0 | Status: SHIPPED | OUTPATIENT
Start: 2021-03-19 | End: 2021-04-19

## 2021-03-19 RX ORDER — HYDROCODONE BITARTRATE AND ACETAMINOPHEN 5; 325 MG/1; MG/1
1-2 TABLET ORAL EVERY 4 HOURS PRN
Qty: 30 TABLET | Refills: 0 | Status: SHIPPED | OUTPATIENT
Start: 2021-03-19 | End: 2021-05-18

## 2021-03-19 NOTE — TELEPHONE ENCOUNTER
LOV: 1/6/21    LAST LAB: n/a    LAST RX: norco - 1/19/21  alprazolam - 2/22/21    Next OV:   Future Appointments   Date Time Provider Meredith Jeter   3/66/7732 00:82 AM Adelina Wasserman MD [facility] Jonas Sarabia       PROTOCOL: n/a

## 2021-04-12 ENCOUNTER — ANTI-COAG VISIT (OUTPATIENT)
Dept: FAMILY MEDICINE CLINIC | Facility: CLINIC | Age: 61
End: 2021-04-12
Payer: MEDICARE

## 2021-04-12 PROCEDURE — 93793 ANTICOAG MGMT PT WARFARIN: CPT | Performed by: FAMILY MEDICINE

## 2021-04-12 PROCEDURE — 85610 PROTHROMBIN TIME: CPT | Performed by: FAMILY MEDICINE

## 2021-04-13 ENCOUNTER — TELEPHONE (OUTPATIENT)
Dept: FAMILY MEDICINE CLINIC | Facility: CLINIC | Age: 61
End: 2021-04-13

## 2021-04-13 NOTE — TELEPHONE ENCOUNTER
Advised she may schedule for either pfizer or moderna - both are recommended by Dr. Aniket Johnson. She denies hx of anaphylactic reaction. Verbalized understanding.

## 2021-04-13 NOTE — TELEPHONE ENCOUNTER
She is asking what covid vaccine Dr. Esthela Britt recommends with all her health conditions. She said she will not get the Pitney Keaton.

## 2021-04-19 RX ORDER — CYCLOBENZAPRINE HCL 10 MG
10 TABLET ORAL 3 TIMES DAILY PRN
Qty: 90 TABLET | Refills: 0 | Status: SHIPPED | OUTPATIENT
Start: 2021-04-19 | End: 2021-05-18

## 2021-04-19 RX ORDER — ALPRAZOLAM 1 MG/1
TABLET ORAL
Qty: 45 TABLET | Refills: 0 | Status: SHIPPED | OUTPATIENT
Start: 2021-04-19 | End: 2021-05-18

## 2021-04-19 RX ORDER — BENAZEPRIL HYDROCHLORIDE 20 MG/1
20 TABLET ORAL DAILY
Qty: 180 TABLET | Refills: 0 | Status: SHIPPED | OUTPATIENT
Start: 2021-04-19 | End: 2022-01-15

## 2021-04-19 NOTE — TELEPHONE ENCOUNTER
Last refill on alprazolam #45 on 3/19/2021  Last refill on benazepril #180 on 1/21/2021    Last office visit pertaining to refill on 12/29/2020  Future Appointments   Date Time Provider Meredith Jeter   4/68/4268 43:94 AM Yordan Mora MD [facility]

## 2021-05-03 ENCOUNTER — OFFICE VISIT (OUTPATIENT)
Dept: CARDIOLOGY | Age: 61
End: 2021-05-03

## 2021-05-03 VITALS
BODY MASS INDEX: 42.89 KG/M2 | DIASTOLIC BLOOD PRESSURE: 69 MMHG | HEIGHT: 68 IN | SYSTOLIC BLOOD PRESSURE: 104 MMHG | HEART RATE: 69 BPM | WEIGHT: 283 LBS

## 2021-05-03 DIAGNOSIS — N18.32 STAGE 3B CHRONIC KIDNEY DISEASE (CMD): ICD-10-CM

## 2021-05-03 DIAGNOSIS — J43.8 OTHER EMPHYSEMA (CMD): ICD-10-CM

## 2021-05-03 DIAGNOSIS — I10 ESSENTIAL HYPERTENSION: Primary | ICD-10-CM

## 2021-05-03 DIAGNOSIS — R06.09 DYSPNEA ON EFFORT: ICD-10-CM

## 2021-05-03 DIAGNOSIS — I26.99 OTHER ACUTE PULMONARY EMBOLISM WITHOUT ACUTE COR PULMONALE (CMD): ICD-10-CM

## 2021-05-03 PROCEDURE — 99214 OFFICE O/P EST MOD 30 MIN: CPT | Performed by: INTERNAL MEDICINE

## 2021-05-03 RX ORDER — IPRATROPIUM BROMIDE AND ALBUTEROL SULFATE 2.5; .5 MG/3ML; MG/3ML
SOLUTION RESPIRATORY (INHALATION)
COMMUNITY
Start: 2021-03-19

## 2021-05-03 RX ORDER — ALBUTEROL SULFATE 90 UG/1
2 AEROSOL, METERED RESPIRATORY (INHALATION)
COMMUNITY
Start: 2021-02-22

## 2021-05-03 ASSESSMENT — PATIENT HEALTH QUESTIONNAIRE - PHQ9
CLINICAL INTERPRETATION OF PHQ2 SCORE: NO FURTHER SCREENING NEEDED
1. LITTLE INTEREST OR PLEASURE IN DOING THINGS: NOT AT ALL
SUM OF ALL RESPONSES TO PHQ9 QUESTIONS 1 AND 2: 0
2. FEELING DOWN, DEPRESSED OR HOPELESS: NOT AT ALL
SUM OF ALL RESPONSES TO PHQ9 QUESTIONS 1 AND 2: 0
CLINICAL INTERPRETATION OF PHQ9 SCORE: NO FURTHER SCREENING NEEDED

## 2021-05-10 ENCOUNTER — LAB ENCOUNTER (OUTPATIENT)
Dept: LAB | Age: 61
End: 2021-05-10
Attending: FAMILY MEDICINE
Payer: MEDICARE

## 2021-05-10 ENCOUNTER — OFFICE VISIT (OUTPATIENT)
Dept: FAMILY MEDICINE CLINIC | Facility: CLINIC | Age: 61
End: 2021-05-10
Payer: MEDICARE

## 2021-05-10 VITALS
DIASTOLIC BLOOD PRESSURE: 78 MMHG | BODY MASS INDEX: 43.95 KG/M2 | HEIGHT: 68 IN | OXYGEN SATURATION: 97 % | WEIGHT: 290 LBS | TEMPERATURE: 97 F | HEART RATE: 88 BPM | SYSTOLIC BLOOD PRESSURE: 110 MMHG

## 2021-05-10 DIAGNOSIS — N64.89 BILATERAL PENDULOUS BREASTS: ICD-10-CM

## 2021-05-10 DIAGNOSIS — M54.41 CHRONIC RIGHT-SIDED LOW BACK PAIN WITH RIGHT-SIDED SCIATICA: ICD-10-CM

## 2021-05-10 DIAGNOSIS — R73.01 IFG (IMPAIRED FASTING GLUCOSE): ICD-10-CM

## 2021-05-10 DIAGNOSIS — R63.5 WEIGHT GAIN: ICD-10-CM

## 2021-05-10 DIAGNOSIS — G89.29 CHRONIC RIGHT-SIDED LOW BACK PAIN WITH RIGHT-SIDED SCIATICA: ICD-10-CM

## 2021-05-10 DIAGNOSIS — J84.9 INTERSTITIAL LUNG DISEASE (HCC): ICD-10-CM

## 2021-05-10 DIAGNOSIS — J44.1 CHRONIC OBSTRUCTIVE PULMONARY DISEASE WITH ACUTE EXACERBATION (HCC): Primary | ICD-10-CM

## 2021-05-10 PROCEDURE — 36415 COLL VENOUS BLD VENIPUNCTURE: CPT

## 2021-05-10 PROCEDURE — 99214 OFFICE O/P EST MOD 30 MIN: CPT | Performed by: FAMILY MEDICINE

## 2021-05-10 PROCEDURE — 85610 PROTHROMBIN TIME: CPT | Performed by: FAMILY MEDICINE

## 2021-05-10 PROCEDURE — 80053 COMPREHEN METABOLIC PANEL: CPT

## 2021-05-10 PROCEDURE — 84443 ASSAY THYROID STIM HORMONE: CPT

## 2021-05-10 PROCEDURE — 80061 LIPID PANEL: CPT

## 2021-05-10 PROCEDURE — 83036 HEMOGLOBIN GLYCOSYLATED A1C: CPT

## 2021-05-10 NOTE — PROGRESS NOTES
Elisabeth Wilkerson is a 63year old female. Patient presents with:  Test Results: go over test results. . room 1      HPI:   Patient has several concerns. She had a CT scan of the chest in March. It showed a lower lobe mass.   She had a PET scan in April which Wheezing., Disp: 1 Inhaler, Rfl: 1  LORazepam 0.5 MG Oral Tab, Take 0.5 mg by mouth., Disp: , Rfl:   predniSONE 20 MG Oral Tab, TAKE ONE TABLET BY MOUTH DAILY, Disp: 90 tablet, Rfl: 0  hydrochlorothiazide 25 MG Oral Tab, TAKE 1/2 TABLET BY MOUTH EVERY DAY, dysplasia 1/19/2017   • History of colon polyps 4/9/2018   • History of pulmonary embolus (PE)     2014, right sided, at time was driving 8 hrs for work everyday    • HTN (hypertension)    • OJHN (obstructive sleep apnea)     Unable to tolerate CPAP   • Pos : 280 lb (127 kg)  09/01/20 : 284 lb 6 oz (129 kg)  06/17/20 : 291 lb (132 kg)  03/16/20 : 291 lb 2 oz (132.1 kg)    Ideal body weight: 63.9 kg (140 lb 14 oz)  Adjusted ideal body weight: 76.3 kg (168 lb 2 oz)    GENERAL: well developed, well nourished,in

## 2021-05-13 ENCOUNTER — OFFICE VISIT (OUTPATIENT)
Dept: RHEUMATOLOGY | Facility: CLINIC | Age: 61
End: 2021-05-13
Payer: MEDICARE

## 2021-05-13 VITALS
BODY MASS INDEX: 43.35 KG/M2 | TEMPERATURE: 98 F | DIASTOLIC BLOOD PRESSURE: 80 MMHG | WEIGHT: 286 LBS | SYSTOLIC BLOOD PRESSURE: 125 MMHG | HEIGHT: 68 IN | HEART RATE: 75 BPM

## 2021-05-13 DIAGNOSIS — E66.01 CLASS 3 SEVERE OBESITY DUE TO EXCESS CALORIES WITH SERIOUS COMORBIDITY AND BODY MASS INDEX (BMI) OF 40.0 TO 44.9 IN ADULT (HCC): ICD-10-CM

## 2021-05-13 DIAGNOSIS — J44.9 CHRONIC OBSTRUCTIVE PULMONARY DISEASE, UNSPECIFIED COPD TYPE (HCC): ICD-10-CM

## 2021-05-13 DIAGNOSIS — Z86.711 HISTORY OF PULMONARY EMBOLUS (PE): ICD-10-CM

## 2021-05-13 DIAGNOSIS — M32.9 SYSTEMIC LUPUS ERYTHEMATOSUS, UNSPECIFIED SLE TYPE, UNSPECIFIED ORGAN INVOLVEMENT STATUS (HCC): Primary | ICD-10-CM

## 2021-05-13 PROCEDURE — 99214 OFFICE O/P EST MOD 30 MIN: CPT | Performed by: INTERNAL MEDICINE

## 2021-05-13 RX ORDER — HYDROXYCHLOROQUINE SULFATE 200 MG/1
TABLET, FILM COATED ORAL
Qty: 180 TABLET | Refills: 3 | Status: SHIPPED | OUTPATIENT
Start: 2021-05-13 | End: 2021-11-16

## 2021-05-13 NOTE — PATIENT INSTRUCTIONS
Use Plaquenil 200 mg twice a day for the Lupus. Use Prednisone 10 mg per day for your breathing. If breathing stabilizes, lower the Prednisione to 5 mg per day. Ventolin inhaler as needed. Nebulizor use as needed.   CT of Lung in 3 months to recheck abn

## 2021-05-13 NOTE — PROGRESS NOTES
[facility]  Dr. Ryan Malik Progress Note     Subjective:   Jayna Pettit is a(n) 63year old female. Current complaints: Patient presents with:   Follow - Up: LOV 11/12/20, had to go back on flexeril to be able to walk from the 05/04-05/08 due to her deep nonspecific pulmonary micronodules without any significant progression since prior exam.  #3 extensive coronary atheroartery atherosclerosis. #4 old thoracic compression fracture.   Assessment:   Systemic lupus erythematosus, unspecified sle type, unspecif

## 2021-05-18 RX ORDER — ALPRAZOLAM 1 MG/1
TABLET ORAL
Qty: 45 TABLET | Refills: 0 | Status: SHIPPED | OUTPATIENT
Start: 2021-05-18 | End: 2021-06-22

## 2021-05-18 RX ORDER — CYCLOBENZAPRINE HCL 10 MG
10 TABLET ORAL 3 TIMES DAILY PRN
Qty: 90 TABLET | Refills: 0 | Status: SHIPPED | OUTPATIENT
Start: 2021-05-18 | End: 2021-08-24

## 2021-05-18 RX ORDER — HYDROCODONE BITARTRATE AND ACETAMINOPHEN 5; 325 MG/1; MG/1
1-2 TABLET ORAL EVERY 4 HOURS PRN
Qty: 30 TABLET | Refills: 0 | Status: SHIPPED | OUTPATIENT
Start: 2021-05-18 | End: 2021-08-02

## 2021-05-18 NOTE — TELEPHONE ENCOUNTER
Cyclobenzaprine   Last refill: 04/19/21  Qty: 90  W/ 0 refills  Last ov: 05/10/21    Hydrocodone   Last refill: 03/19/21  Qty: 30  W/ 0 refills  Last ov 05/10/21    Alprazolam   Last refill: 04/19/21  Qty: 45  W/ 0 refills  Last ov: 05/10/21    Requested P

## 2021-05-18 NOTE — TELEPHONE ENCOUNTER
cyclobenzaprine 10 MG Oral Tab, HYDROcodone-acetaminophen 5-325 MG Oral Tab and ALPRAZolam 1 MG Oral Tab please call into [employer]

## 2021-06-08 DIAGNOSIS — I10 ESSENTIAL HYPERTENSION: ICD-10-CM

## 2021-06-08 NOTE — TELEPHONE ENCOUNTER
atenolol 25 MG Oral Tab  Pt. Asking if she can get 4 and 5 mg. Of warfarin since she is currently taking 9 mg. Instead of the warfarin 10 mg.  Since she has to break those in half.   hydrochlorothiazide 25 MG Oral Tab   Agnes

## 2021-06-09 RX ORDER — WARFARIN SODIUM 5 MG/1
TABLET ORAL
Qty: 90 TABLET | Refills: 0 | Status: SHIPPED | OUTPATIENT
Start: 2021-06-09 | End: 2021-07-12

## 2021-06-09 RX ORDER — HYDROCHLOROTHIAZIDE 25 MG/1
TABLET ORAL
Qty: 90 TABLET | Refills: 0 | Status: SHIPPED | OUTPATIENT
Start: 2021-06-09 | End: 2021-11-29

## 2021-06-09 RX ORDER — ATENOLOL 25 MG/1
25 TABLET ORAL DAILY
Qty: 90 TABLET | Refills: 0 | Status: SHIPPED | OUTPATIENT
Start: 2021-06-09 | End: 2021-09-07

## 2021-06-09 RX ORDER — WARFARIN SODIUM 4 MG/1
TABLET ORAL
Qty: 90 TABLET | Refills: 0 | Status: SHIPPED | OUTPATIENT
Start: 2021-06-09 | End: 2021-07-12 | Stop reason: DRUGHIGH

## 2021-06-09 NOTE — TELEPHONE ENCOUNTER
LOV: 5-10-21    LAST LAB: 5-10-21    LAST RX:            Medication Quantity Refills Start End   Warfarin Sodium 4 MG Oral Tab 90 tablet 0 3/4/2021    Si po at bedtime (along with 1/2 of 10mg tab to =9mg)       Medication Quantity Refills Start End

## 2021-06-22 RX ORDER — ALPRAZOLAM 1 MG/1
TABLET ORAL
Qty: 45 TABLET | Refills: 0 | Status: SHIPPED | OUTPATIENT
Start: 2021-06-22 | End: 2021-07-20

## 2021-06-22 NOTE — TELEPHONE ENCOUNTER
Last office visit:  05/10/21  Last refill:  05/18/21  #45, no refills      Future Appointments   Date Time Provider Meredith Steffany   9/8/2021 10:30 AM Rico Conteh DO [facility] [place]   30/69/6370 17:86 AM Corazon Richey MD [facility]

## 2021-07-12 ENCOUNTER — ANTI-COAG VISIT (OUTPATIENT)
Dept: FAMILY MEDICINE CLINIC | Facility: CLINIC | Age: 61
End: 2021-07-12
Payer: MEDICARE

## 2021-07-12 LAB — INR: 3.4 (ref 0.8–1.2)

## 2021-07-12 PROCEDURE — 85610 PROTHROMBIN TIME: CPT | Performed by: FAMILY MEDICINE

## 2021-07-12 PROCEDURE — 93793 ANTICOAG MGMT PT WARFARIN: CPT | Performed by: FAMILY MEDICINE

## 2021-07-12 RX ORDER — WARFARIN SODIUM 1 MG/1
2 TABLET ORAL NIGHTLY
Qty: 180 TABLET | Refills: 0 | Status: SHIPPED | OUTPATIENT
Start: 2021-07-12 | End: 2021-07-20

## 2021-07-12 NOTE — TELEPHONE ENCOUNTER
LOV: 5/10/21    LAST LAB: 7/12/21    LAST RX: 6/9/21    Next OV:   Future Appointments   Date Time Provider Meredith Jeter   7/20/2021 10:00 AM [facility] NURSE [facility] [place]   9/8/2021 10:30 AM Hanane Duckworth DO [facility] [place]   11/16/2

## 2021-07-12 NOTE — PROGRESS NOTES
Jayda Doty is here for an INR check. Coumadin dose: 9mg nightly. Blood sample was collected without complications. INR 3.4     She states she self adjusted her prednisone; increased dose from 10mg to 20 mg for 5 days b/c her inhaler was not helping.  She reports

## 2021-07-20 ENCOUNTER — ANTI-COAG VISIT (OUTPATIENT)
Dept: FAMILY MEDICINE CLINIC | Facility: CLINIC | Age: 61
End: 2021-07-20
Payer: MEDICARE

## 2021-07-20 DIAGNOSIS — Z79.01 CURRENT USE OF LONG TERM ANTICOAGULATION: Primary | ICD-10-CM

## 2021-07-20 DIAGNOSIS — J44.1 CHRONIC OBSTRUCTIVE PULMONARY DISEASE WITH ACUTE EXACERBATION (HCC): ICD-10-CM

## 2021-07-20 LAB — INR: 1.8 (ref 0.8–1.2)

## 2021-07-20 PROCEDURE — 93793 ANTICOAG MGMT PT WARFARIN: CPT | Performed by: FAMILY MEDICINE

## 2021-07-20 PROCEDURE — 85610 PROTHROMBIN TIME: CPT | Performed by: FAMILY MEDICINE

## 2021-07-20 RX ORDER — ALPRAZOLAM 1 MG/1
TABLET ORAL
Qty: 45 TABLET | Refills: 0 | Status: SHIPPED | OUTPATIENT
Start: 2021-07-20 | End: 2021-08-24

## 2021-07-20 RX ORDER — PREDNISONE 20 MG/1
TABLET ORAL
Qty: 90 TABLET | Refills: 0 | Status: SHIPPED | OUTPATIENT
Start: 2021-07-20 | End: 2021-11-29

## 2021-07-20 NOTE — PROGRESS NOTES
Margo Montano is here for an INR recheck. She is on coumadin 7mg nightly. Blood sample collected without complications. INR 1.8     Result routed to Dr. Danae Camacho for review.

## 2021-07-20 NOTE — TELEPHONE ENCOUNTER
Last refill #90 on 12/29/2020  Last office visit pertaining to refill on 5/10/2021  Future Appointments   Date Time Provider Meredith Jeter   7/20/2021 10:00 AM [facility] NURSE [facility] [place]   9/8/2021 10:30 AM Celina Mena DO [facility] [place]

## 2021-07-27 ENCOUNTER — ANTI-COAG VISIT (OUTPATIENT)
Dept: FAMILY MEDICINE CLINIC | Facility: CLINIC | Age: 61
End: 2021-07-27
Payer: MEDICARE

## 2021-07-27 LAB — INR: 2 (ref 0.8–1.2)

## 2021-07-27 PROCEDURE — 93793 ANTICOAG MGMT PT WARFARIN: CPT | Performed by: FAMILY MEDICINE

## 2021-07-27 PROCEDURE — 85610 PROTHROMBIN TIME: CPT | Performed by: FAMILY MEDICINE

## 2021-07-29 NOTE — TELEPHONE ENCOUNTER
Last office visit: 5/10/21  Last refill: 5/13/21  Labs Due: 11/11/21  Future Appointments   Date Time Provider Meredith Steffany   9/8/2021 10:30 AM Rico Conteh DO [facility] [place]   04/25/6870 95:44 AM Corazon Richey MD [facility] Judit Ortez

## 2021-07-31 NOTE — TELEPHONE ENCOUNTER
HYDROcodone-acetaminophen 5-325 MG Oral Tab    PLEASE SEND REFILL TO [facility] IN [facility]

## 2021-07-31 NOTE — TELEPHONE ENCOUNTER
LOV 5/10/21    LAST LAB 5/10/21 due to rechecl 11/21    LAST RX 5/18/21 #30/0rf    Next OV   Future Appointments   Date Time Provider Meredith Jeter   9/8/2021 10:30 AM Eitan Crandall DO [facility] [place]   71/90/3544 65:21 AM Jaqueline Mail, Tanna Fallon

## 2021-08-02 RX ORDER — HYDROCODONE BITARTRATE AND ACETAMINOPHEN 5; 325 MG/1; MG/1
1-2 TABLET ORAL EVERY 4 HOURS PRN
Qty: 30 TABLET | Refills: 0 | Status: SHIPPED | OUTPATIENT
Start: 2021-08-02 | End: 2021-09-29

## 2021-08-02 RX ORDER — HYDROXYCHLOROQUINE SULFATE 200 MG/1
TABLET, FILM COATED ORAL
Qty: 180 TABLET | Refills: 3 | OUTPATIENT
Start: 2021-08-02

## 2021-08-18 RX ORDER — WARFARIN SODIUM 4 MG/1
8 TABLET ORAL NIGHTLY
Qty: 180 TABLET | Refills: 1 | Status: SHIPPED | OUTPATIENT
Start: 2021-08-18 | End: 2021-09-22

## 2021-08-18 NOTE — TELEPHONE ENCOUNTER
WARFARIN 4MG. SHE IS CURRENTLY TAKING 8MG. SO SHE WOULD LIKE TO GET 4MG FILLED SO SHE ONLY HAS TO TAKE 2 PILLS.    Abril Mcnally

## 2021-08-18 NOTE — TELEPHONE ENCOUNTER
LOV: 05/10/21    LAST LAB: 7/27/21 - currently on 8mg nightly    LAST RX: 07/20/21    Next OV:   Future Appointments   Date Time Provider Meredith Jeter   9/8/2021 10:30 AM Eitan Crandall DO [facility] [place]   83/31/4087 91:19 AM Rudy Sandoval

## 2021-08-24 RX ORDER — CYCLOBENZAPRINE HCL 10 MG
TABLET ORAL
Qty: 90 TABLET | Refills: 0 | Status: SHIPPED | OUTPATIENT
Start: 2021-08-24 | End: 2021-09-22

## 2021-08-24 RX ORDER — ALPRAZOLAM 1 MG/1
TABLET ORAL
Qty: 45 TABLET | Refills: 0 | Status: SHIPPED | OUTPATIENT
Start: 2021-08-24 | End: 2021-09-22

## 2021-08-24 NOTE — TELEPHONE ENCOUNTER
Alprazolam   Last refill: 07/20/21  Qty: 45  W/ 0 refills  Last ov: 05/10/21    Cyclobenzaprine   Last refill: 05/18/21  Qty: 90  W/ 0 refills   Last ov 05/10/21    Requested Prescriptions     Pending Prescriptions Disp Refills   • ALPRAZOLAM 1 MG Oral Tab

## 2021-09-04 DIAGNOSIS — I10 ESSENTIAL HYPERTENSION: ICD-10-CM

## 2021-09-07 RX ORDER — ATENOLOL 25 MG/1
TABLET ORAL
Qty: 90 TABLET | Refills: 0 | Status: SHIPPED | OUTPATIENT
Start: 2021-09-07

## 2021-09-07 RX ORDER — WARFARIN SODIUM 5 MG/1
TABLET ORAL
Qty: 90 TABLET | Refills: 0 | Status: SHIPPED | OUTPATIENT
Start: 2021-09-07 | End: 2021-09-22

## 2021-09-07 NOTE — TELEPHONE ENCOUNTER
Warfarin 5mg  Last refill: 07/20/21  Qty: 90  W/ 0 refills  Last ov: 05/10/21    Atenolol  Last refill: 06/09/21  Qty: 90  W/ 0 refills  Last ov 05/10/21  Requested Prescriptions     Pending Prescriptions Disp Refills   • warfarin 5 MG Oral Tab [Pharmacy M

## 2021-09-13 ENCOUNTER — MED REC SCAN ONLY (OUTPATIENT)
Dept: FAMILY MEDICINE CLINIC | Facility: CLINIC | Age: 61
End: 2021-09-13

## 2021-09-22 ENCOUNTER — OFFICE VISIT (OUTPATIENT)
Dept: FAMILY MEDICINE CLINIC | Facility: CLINIC | Age: 61
End: 2021-09-22
Payer: MEDICARE

## 2021-09-22 VITALS
HEART RATE: 84 BPM | RESPIRATION RATE: 18 BRPM | WEIGHT: 285.25 LBS | TEMPERATURE: 97 F | OXYGEN SATURATION: 97 % | DIASTOLIC BLOOD PRESSURE: 74 MMHG | SYSTOLIC BLOOD PRESSURE: 120 MMHG | BODY MASS INDEX: 42.25 KG/M2 | HEIGHT: 69 IN

## 2021-09-22 DIAGNOSIS — M32.9 SYSTEMIC LUPUS ERYTHEMATOSUS, UNSPECIFIED SLE TYPE, UNSPECIFIED ORGAN INVOLVEMENT STATUS (HCC): ICD-10-CM

## 2021-09-22 DIAGNOSIS — Z12.31 VISIT FOR SCREENING MAMMOGRAM: ICD-10-CM

## 2021-09-22 DIAGNOSIS — J44.9 CHRONIC OBSTRUCTIVE PULMONARY DISEASE, UNSPECIFIED COPD TYPE (HCC): ICD-10-CM

## 2021-09-22 DIAGNOSIS — E66.01 CLASS 3 SEVERE OBESITY DUE TO EXCESS CALORIES WITH SERIOUS COMORBIDITY AND BODY MASS INDEX (BMI) OF 40.0 TO 44.9 IN ADULT (HCC): ICD-10-CM

## 2021-09-22 DIAGNOSIS — E27.40 ADRENAL INSUFFICIENCY (HCC): ICD-10-CM

## 2021-09-22 DIAGNOSIS — Z00.00 ENCOUNTER FOR ANNUAL HEALTH EXAMINATION: Primary | ICD-10-CM

## 2021-09-22 DIAGNOSIS — N18.31 STAGE 3A CHRONIC KIDNEY DISEASE (HCC): ICD-10-CM

## 2021-09-22 DIAGNOSIS — I10 PRIMARY HYPERTENSION: ICD-10-CM

## 2021-09-22 PROBLEM — R06.00 DYSPNEA ON EFFORT: Status: RESOLVED | Noted: 2020-09-18 | Resolved: 2021-09-22

## 2021-09-22 PROBLEM — Z86.711 HISTORY OF PULMONARY EMBOLUS (PE): Status: RESOLVED | Noted: 2020-01-15 | Resolved: 2021-09-22

## 2021-09-22 PROBLEM — R06.09 DYSPNEA ON EFFORT: Status: RESOLVED | Noted: 2020-09-18 | Resolved: 2021-09-22

## 2021-09-22 LAB — INR: 3.5 (ref 0.8–1.2)

## 2021-09-22 PROCEDURE — 96160 PT-FOCUSED HLTH RISK ASSMT: CPT | Performed by: FAMILY MEDICINE

## 2021-09-22 PROCEDURE — 99396 PREV VISIT EST AGE 40-64: CPT | Performed by: FAMILY MEDICINE

## 2021-09-22 PROCEDURE — G0439 PPPS, SUBSEQ VISIT: HCPCS | Performed by: FAMILY MEDICINE

## 2021-09-22 PROCEDURE — 3078F DIAST BP <80 MM HG: CPT | Performed by: FAMILY MEDICINE

## 2021-09-22 PROCEDURE — 85610 PROTHROMBIN TIME: CPT | Performed by: FAMILY MEDICINE

## 2021-09-22 PROCEDURE — 3074F SYST BP LT 130 MM HG: CPT | Performed by: FAMILY MEDICINE

## 2021-09-22 PROCEDURE — 3008F BODY MASS INDEX DOCD: CPT | Performed by: FAMILY MEDICINE

## 2021-09-22 RX ORDER — CYCLOBENZAPRINE HCL 10 MG
TABLET ORAL
Qty: 90 TABLET | Refills: 0 | Status: SHIPPED | OUTPATIENT
Start: 2021-09-22 | End: 2021-10-29

## 2021-09-22 RX ORDER — BUDESONIDE AND FORMOTEROL FUMARATE DIHYDRATE 160; 4.5 UG/1; UG/1
2 AEROSOL RESPIRATORY (INHALATION) 2 TIMES DAILY PRN
COMMUNITY
Start: 2021-09-09

## 2021-09-22 RX ORDER — ALPRAZOLAM 1 MG/1
TABLET ORAL
Qty: 45 TABLET | Refills: 0 | Status: SHIPPED | OUTPATIENT
Start: 2021-09-22 | End: 2021-10-29

## 2021-09-22 RX ORDER — IPRATROPIUM BROMIDE AND ALBUTEROL SULFATE 2.5; .5 MG/3ML; MG/3ML
SOLUTION RESPIRATORY (INHALATION) AS NEEDED
COMMUNITY
Start: 2021-03-19

## 2021-09-22 NOTE — TELEPHONE ENCOUNTER
Was here today for OV    Alprazolam:  08/24/21  #45, no refill  Cyclobenzaprine:  08/24/21  #90, no refill      Future Appointments   Date Time Provider Meredith Jeter   9/29/2021 11:00 AM [facility] [place] [facility] [place]   54/00/1326 40:19 AM Khoi Rangel

## 2021-09-22 NOTE — PATIENT INSTRUCTIONS
Elisabeth Cassidy's SCREENING SCHEDULE   Tests on this list are recommended by your physician but may not be covered, or covered at this frequency, by your insurer. Please check with your insurance carrier before scheduling to verify coverage.    PREVENTATIV 08/01/2018      No recommendations at this time   Pap and Pelvic    Pap   Covered every 2 years for women at normal risk;  Annually if at high risk 04/20/2018  Pap Smear,3 Years due on 04/20/2021    Chlamydia Annually if high risk -  No recommendations at t necessary form from the [facility]. http://[email or web address] idph.[facility]. [place]/public/books/advin.htm  A link to the [employer].  This site has a lot of good information including definitions of the different types of Ad

## 2021-09-22 NOTE — PROGRESS NOTES
HPI:   Tad Rodriguez is a 63year old female who presents for a Medicare Subsequent Annual Wellness visit (Pt already had Initial Annual Wellness). No acute complaints          Fall/Risk Assessment   She has been screened for Falls and is low risk:  [facility]     SLE (systemic lupus erythematosus) (HCC)     Adrenal insufficiency (HCC)     CKD (chronic kidney disease) stage 3, GFR 30-59 ml/min ([facility])     Class 3 severe obesity due to excess calories with serious comorbidity and body mass index (BMI) of 40. 0 DAY  Hydroxychloroquine Sulfate 200 MG Oral Tab, TAKE 2 TABLETS BY MOUTH ONCE DAILY  Benazepril HCl 20 MG Oral Tab, Take 1 tablet (20 mg total) by mouth daily.   VENTOLIN  (90 Base) MCG/ACT Inhalation Aero Soln, Inhale 2 puffs into the lungs every 4 her maternal aunt. SOCIAL HISTORY:   She  reports that she has been smoking cigarettes. She has a 20.00 pack-year smoking history. She has never used smokeless tobacco. She reports current alcohol use. She reports that she does not use drugs.      REVIEW CVA tenderness   Lungs:   Clear to auscultation bilaterally, respirations unlabored   Heart:  Regular rate and rhythm, S1 and S2 normal, no murmur, rub, or gallop   Abdomen:   Soft, non-tender, bowel sounds active all four quadrants,  no masses, no organom PROTHROMBIN TIME    Adrenal insufficiency (HCC)  -     PROTHROMBIN TIME    Stage 3a chronic kidney disease (HCC)  -     PROTHROMBIN TIME    Class 3 severe obesity due to excess calories with serious comorbidity and body mass index (BMI) of 40.0 to 44.9 in Cardiovascular Disease Screening    Lipid Panel  Cholesterol  Lipoprotein (HDL)  Triglycerides Covered every 5 years for all Medicare beneficiaries without apparent signs or symptoms of cardiovascular disease Lab Results   Component Value Date    CHOLEST (Gixyykb90 & [id number]) covered once after 65 Prevnar 13: 07/16/2018    Rvhmnlfla85: -     Pneumococcal Vaccination(1 of 2 - PPSV23) due on 09/10/2018    Hepatitis B One screening covered for patients with certain risk factors   -  No recommendations at

## 2021-09-29 ENCOUNTER — ANTI-COAG VISIT (OUTPATIENT)
Dept: FAMILY MEDICINE CLINIC | Facility: CLINIC | Age: 61
End: 2021-09-29
Payer: MEDICARE

## 2021-09-29 DIAGNOSIS — Z79.01 ENCOUNTER FOR CURRENT LONG-TERM USE OF ANTICOAGULANTS: Primary | ICD-10-CM

## 2021-09-29 LAB — INR: 2.3 (ref 0.8–1.2)

## 2021-09-29 PROCEDURE — 93793 ANTICOAG MGMT PT WARFARIN: CPT | Performed by: FAMILY MEDICINE

## 2021-09-29 PROCEDURE — 85610 PROTHROMBIN TIME: CPT | Performed by: FAMILY MEDICINE

## 2021-09-29 RX ORDER — HYDROCODONE BITARTRATE AND ACETAMINOPHEN 5; 325 MG/1; MG/1
1-2 TABLET ORAL EVERY 4 HOURS PRN
Qty: 30 TABLET | Refills: 0 | Status: SHIPPED | OUTPATIENT
Start: 2021-09-29 | End: 2021-11-30

## 2021-09-29 NOTE — TELEPHONE ENCOUNTER
[facility] printed and [facility] VERIF[facility] TODAY

## 2021-09-29 NOTE — PROGRESS NOTES
Sweta Meyer is here for INR check. She is on coumadin 7mg nightly. Blood sample collected without complications. INR 2.3, results routed to Dr. Alverto David.

## 2021-09-29 NOTE — TELEPHONE ENCOUNTER
LOV: 09/22/21    LAST LAB: n/a    LAST RX: 8/2/21    Next OV:   Future Appointments   Date Time Provider Meredith Jeter   58/63/5732 86:88 AM Lino Gallegos MD [facility]       PROTOCOL: n/a

## 2021-10-29 ENCOUNTER — TELEPHONE (OUTPATIENT)
Dept: FAMILY MEDICINE CLINIC | Facility: CLINIC | Age: 61
End: 2021-10-29

## 2021-10-29 DIAGNOSIS — Z79.01 ENCOUNTER FOR CURRENT LONG-TERM USE OF ANTICOAGULANTS: Primary | ICD-10-CM

## 2021-10-29 RX ORDER — CYCLOBENZAPRINE HCL 10 MG
10 TABLET ORAL 3 TIMES DAILY PRN
Qty: 90 TABLET | Refills: 0 | Status: SHIPPED | OUTPATIENT
Start: 2021-10-29 | End: 2021-11-30

## 2021-10-29 RX ORDER — ALPRAZOLAM 1 MG/1
0.5 TABLET ORAL 3 TIMES DAILY PRN
Qty: 45 TABLET | Refills: 0 | Status: SHIPPED | OUTPATIENT
Start: 2021-10-29 | End: 2021-11-30

## 2021-10-29 RX ORDER — WARFARIN SODIUM 1 MG/1
2 TABLET ORAL NIGHTLY
Qty: 180 TABLET | Refills: 0 | Status: SHIPPED | OUTPATIENT
Start: 2021-10-29 | End: 2021-11-15 | Stop reason: DRUGHIGH

## 2021-10-29 RX ORDER — WARFARIN SODIUM 4 MG/1
2 TABLET ORAL NIGHTLY
Qty: 45 TABLET | Refills: 1 | COMMUNITY
Start: 2021-10-29 | End: 2021-11-15

## 2021-10-29 NOTE — TELEPHONE ENCOUNTER
LOV 09/22/2021    LAST LAB 05/10/2021    LAST RX  Alprazolam #45 R0 09/22/2021  Flexeril # 80 R0 09/22/2021    Next OV   Future Appointments   Date Time Provider Meredith Jeter   69/30/3750 14:05 AM Dante Boateng MD [facility]     PROTOCOL

## 2021-11-01 NOTE — TELEPHONE ENCOUNTER
MUPIROCIN OINTMENT 2%, PLEASE SEND REFILL TO [facility] [place]      CARMENCITA IS TAKING VIT C WITH [facility], IS THIS OKAY AND CAN SHE GET AN INR?

## 2021-11-01 NOTE — TELEPHONE ENCOUNTER
Why does she need a cream? Should be evaluated. I did not see there is a drug interaction between Coumadin and Vitamin C or [facility].

## 2021-11-01 NOTE — TELEPHONE ENCOUNTER
Pt advised. She states she uses the ointment prn for boils around groin area. She gets them occasionally and has run out of the ointment that was prescribed in 2019. Reports hx of incontinence.  She's been having more problems with incontinence over

## 2021-11-15 ENCOUNTER — OFFICE VISIT (OUTPATIENT)
Dept: FAMILY MEDICINE CLINIC | Facility: CLINIC | Age: 61
End: 2021-11-15
Payer: MEDICARE

## 2021-11-15 ENCOUNTER — LAB ENCOUNTER (OUTPATIENT)
Dept: LAB | Age: 61
End: 2021-11-15
Attending: FAMILY MEDICINE
Payer: MEDICARE

## 2021-11-15 VITALS
HEART RATE: 106 BPM | DIASTOLIC BLOOD PRESSURE: 70 MMHG | OXYGEN SATURATION: 99 % | SYSTOLIC BLOOD PRESSURE: 104 MMHG | TEMPERATURE: 98 F | HEIGHT: 69 IN | WEIGHT: 280 LBS | BODY MASS INDEX: 41.47 KG/M2

## 2021-11-15 DIAGNOSIS — I10 ESSENTIAL HYPERTENSION: ICD-10-CM

## 2021-11-15 DIAGNOSIS — R73.01 IFG (IMPAIRED FASTING GLUCOSE): ICD-10-CM

## 2021-11-15 DIAGNOSIS — M32.9 SYSTEMIC LUPUS ERYTHEMATOSUS, UNSPECIFIED SLE TYPE, UNSPECIFIED ORGAN INVOLVEMENT STATUS (HCC): ICD-10-CM

## 2021-11-15 DIAGNOSIS — J44.1 CHRONIC OBSTRUCTIVE PULMONARY DISEASE WITH ACUTE EXACERBATION (HCC): ICD-10-CM

## 2021-11-15 DIAGNOSIS — R41.3 MEMORY LOSS: ICD-10-CM

## 2021-11-15 DIAGNOSIS — N18.31 STAGE 3A CHRONIC KIDNEY DISEASE (HCC): ICD-10-CM

## 2021-11-15 DIAGNOSIS — Z00.00 PREVENTATIVE HEALTH CARE: ICD-10-CM

## 2021-11-15 DIAGNOSIS — R41.3 MEMORY LOSS: Primary | ICD-10-CM

## 2021-11-15 DIAGNOSIS — I10 PRIMARY HYPERTENSION: ICD-10-CM

## 2021-11-15 PROCEDURE — 85652 RBC SED RATE AUTOMATED: CPT

## 2021-11-15 PROCEDURE — 36415 COLL VENOUS BLD VENIPUNCTURE: CPT

## 2021-11-15 PROCEDURE — 81003 URINALYSIS AUTO W/O SCOPE: CPT | Performed by: FAMILY MEDICINE

## 2021-11-15 PROCEDURE — 87476 LYME DIS DNA AMP PROBE: CPT

## 2021-11-15 PROCEDURE — 80053 COMPREHEN METABOLIC PANEL: CPT

## 2021-11-15 PROCEDURE — 3074F SYST BP LT 130 MM HG: CPT | Performed by: FAMILY MEDICINE

## 2021-11-15 PROCEDURE — 3008F BODY MASS INDEX DOCD: CPT | Performed by: FAMILY MEDICINE

## 2021-11-15 PROCEDURE — 99214 OFFICE O/P EST MOD 30 MIN: CPT | Performed by: FAMILY MEDICINE

## 2021-11-15 PROCEDURE — 85610 PROTHROMBIN TIME: CPT | Performed by: FAMILY MEDICINE

## 2021-11-15 PROCEDURE — 82607 VITAMIN B-12: CPT

## 2021-11-15 PROCEDURE — 85025 COMPLETE CBC W/AUTO DIFF WBC: CPT

## 2021-11-15 PROCEDURE — 3078F DIAST BP <80 MM HG: CPT | Performed by: FAMILY MEDICINE

## 2021-11-15 PROCEDURE — 83036 HEMOGLOBIN GLYCOSYLATED A1C: CPT

## 2021-11-15 PROCEDURE — 84443 ASSAY THYROID STIM HORMONE: CPT

## 2021-11-15 PROCEDURE — 80061 LIPID PANEL: CPT

## 2021-11-15 NOTE — PROGRESS NOTES
Brooklyn Gutierrez is a 63year old female. Patient presents with:  Memory Loss: trouble with memory since Sept 2021-wondering about testing for lymes disease. ... [facility] room 2      HPI:   Patient is concerned about memory loss.   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

## 2021-11-16 ENCOUNTER — OFFICE VISIT (OUTPATIENT)
Dept: RHEUMATOLOGY | Facility: CLINIC | Age: 61
End: 2021-11-16
Payer: MEDICARE

## 2021-11-16 VITALS
HEART RATE: 74 BPM | WEIGHT: 285 LBS | HEIGHT: 68.5 IN | TEMPERATURE: 99 F | OXYGEN SATURATION: 95 % | BODY MASS INDEX: 42.7 KG/M2 | SYSTOLIC BLOOD PRESSURE: 112 MMHG | DIASTOLIC BLOOD PRESSURE: 62 MMHG

## 2021-11-16 DIAGNOSIS — M32.9 SYSTEMIC LUPUS ERYTHEMATOSUS, UNSPECIFIED SLE TYPE, UNSPECIFIED ORGAN INVOLVEMENT STATUS (HCC): ICD-10-CM

## 2021-11-16 DIAGNOSIS — F43.9 STRESS: ICD-10-CM

## 2021-11-16 DIAGNOSIS — I10 PRIMARY HYPERTENSION: Primary | ICD-10-CM

## 2021-11-16 DIAGNOSIS — Z86.711 HISTORY OF PULMONARY EMBOLUS (PE): ICD-10-CM

## 2021-11-16 DIAGNOSIS — R73.01 IFG (IMPAIRED FASTING GLUCOSE): ICD-10-CM

## 2021-11-16 DIAGNOSIS — R41.3 MEMORY LOSS: Primary | ICD-10-CM

## 2021-11-16 DIAGNOSIS — Z00.00 PREVENTATIVE HEALTH CARE: ICD-10-CM

## 2021-11-16 DIAGNOSIS — M79.7 FIBROMYALGIA: Primary | ICD-10-CM

## 2021-11-16 DIAGNOSIS — J44.9 CHRONIC OBSTRUCTIVE PULMONARY DISEASE, UNSPECIFIED COPD TYPE (HCC): ICD-10-CM

## 2021-11-16 DIAGNOSIS — I10 PRIMARY HYPERTENSION: ICD-10-CM

## 2021-11-16 DIAGNOSIS — N18.31 STAGE 3A CHRONIC KIDNEY DISEASE (HCC): ICD-10-CM

## 2021-11-16 DIAGNOSIS — E66.01 CLASS 3 SEVERE OBESITY DUE TO EXCESS CALORIES WITH SERIOUS COMORBIDITY AND BODY MASS INDEX (BMI) OF 40.0 TO 44.9 IN ADULT (HCC): ICD-10-CM

## 2021-11-16 PROBLEM — Z86.2 H/O HYPERCOAGULABLE STATE: Status: ACTIVE | Noted: 2021-11-16

## 2021-11-16 PROCEDURE — 99214 OFFICE O/P EST MOD 30 MIN: CPT | Performed by: INTERNAL MEDICINE

## 2021-11-16 RX ORDER — HYDROXYCHLOROQUINE SULFATE 200 MG/1
TABLET, FILM COATED ORAL
Qty: 180 TABLET | Refills: 3 | Status: SHIPPED | OUTPATIENT
Start: 2021-11-16

## 2021-11-16 NOTE — PROGRESS NOTES
[facility]  Dr. Tawana Mena Progress Note     Subjective:   Jose Maria Roberson is a(n) 63year old female. Current complaints: Patient presents with:   Follow - Up: LOV 5-13-21; saw PCP yesterday, labs done after that visit; c/o \"brain fog\" worried about Lym with serious comorbidity and body mass index (bmi) of 40.0 to 44.9 in adult (hcc)  Stress  Plan:   Patient Instructions   Continue Plaquenil 200 mg  twice a day to suppress the autoimmune activity of lupus.   While on Plaquenil you need to have an eye exam

## 2021-11-16 NOTE — PATIENT INSTRUCTIONS
Continue Plaquenil 200 mg  twice a day to suppress the autoimmune activity of lupus.   While on Plaquenil you need to have an eye exam once yearly make sure Plaquenil doesn't cause a condition called retinopathy, damage to the retina of the eye which helps

## 2021-11-24 ENCOUNTER — LABORATORY ENCOUNTER (OUTPATIENT)
Dept: LAB | Age: 61
End: 2021-11-24
Attending: FAMILY MEDICINE
Payer: MEDICARE

## 2021-11-24 ENCOUNTER — ANTI-COAG VISIT (OUTPATIENT)
Dept: FAMILY MEDICINE CLINIC | Facility: CLINIC | Age: 61
End: 2021-11-24
Payer: MEDICARE

## 2021-11-24 ENCOUNTER — TELEPHONE (OUTPATIENT)
Dept: FAMILY MEDICINE CLINIC | Facility: CLINIC | Age: 61
End: 2021-11-24

## 2021-11-24 VITALS — HEART RATE: 72 BPM | OXYGEN SATURATION: 98 %

## 2021-11-24 DIAGNOSIS — Z86.711 HISTORY OF PULMONARY EMBOLUS (PE): ICD-10-CM

## 2021-11-24 DIAGNOSIS — R00.0 TACHYCARDIA: Primary | ICD-10-CM

## 2021-11-24 DIAGNOSIS — M79.7 FIBROMYALGIA: ICD-10-CM

## 2021-11-24 DIAGNOSIS — M32.9 SYSTEMIC LUPUS ERYTHEMATOSUS, UNSPECIFIED SLE TYPE, UNSPECIFIED ORGAN INVOLVEMENT STATUS (HCC): ICD-10-CM

## 2021-11-24 PROCEDURE — 86160 COMPLEMENT ANTIGEN: CPT

## 2021-11-24 PROCEDURE — 86038 ANTINUCLEAR ANTIBODIES: CPT

## 2021-11-24 PROCEDURE — 36415 COLL VENOUS BLD VENIPUNCTURE: CPT

## 2021-11-24 PROCEDURE — 86140 C-REACTIVE PROTEIN: CPT

## 2021-11-24 PROCEDURE — 93793 ANTICOAG MGMT PT WARFARIN: CPT | Performed by: FAMILY MEDICINE

## 2021-11-24 PROCEDURE — 85610 PROTHROMBIN TIME: CPT | Performed by: FAMILY MEDICINE

## 2021-11-24 NOTE — TELEPHONE ENCOUNTER
Diana Conde is worried about her HR. She monitors it with her watch and says her HR can vary between . Checked HR while pt was in the office - HR 72, SpO2 98%.    She has a strong family history of heart issues; father had 4 heart attacks, mom had one he

## 2021-11-24 NOTE — PROGRESS NOTES
Margo Montano is here for an INR check. She has been taking coumadin 8mg nightly. Blood sample collected without complications. During visit, pt mentioned she had a concerns about her heart rate. She states HR ranges from 's.  Discussed with Dr. William Bravo

## 2021-11-28 DIAGNOSIS — I10 ESSENTIAL HYPERTENSION: ICD-10-CM

## 2021-11-28 DIAGNOSIS — J44.1 CHRONIC OBSTRUCTIVE PULMONARY DISEASE WITH ACUTE EXACERBATION (HCC): ICD-10-CM

## 2021-11-29 ENCOUNTER — HOSPITAL ENCOUNTER (OUTPATIENT)
Dept: MAMMOGRAPHY | Age: 61
Discharge: HOME | End: 2021-11-29
Attending: FAMILY MEDICINE
Payer: MEDICARE

## 2021-11-29 ENCOUNTER — HOSPITAL ENCOUNTER (OUTPATIENT)
Dept: GENERAL RADIOLOGY | Age: 61
Discharge: HOME | End: 2021-11-29
Attending: FAMILY MEDICINE
Payer: MEDICARE

## 2021-11-29 DIAGNOSIS — R00.0 TACHYCARDIA: ICD-10-CM

## 2021-11-29 DIAGNOSIS — Z12.31 VISIT FOR SCREENING MAMMOGRAM: ICD-10-CM

## 2021-11-29 PROCEDURE — 93227 XTRNL ECG REC<48 HR R&I: CPT | Performed by: FAMILY MEDICINE

## 2021-11-29 PROCEDURE — 93225 XTRNL ECG REC<48 HRS REC: CPT | Performed by: FAMILY MEDICINE

## 2021-11-29 PROCEDURE — 77067 SCR MAMMO BI INCL CAD: CPT | Performed by: FAMILY MEDICINE

## 2021-11-29 RX ORDER — PREDNISONE 20 MG/1
TABLET ORAL
Qty: 90 TABLET | Refills: 0 | Status: SHIPPED | OUTPATIENT
Start: 2021-11-29

## 2021-11-29 RX ORDER — HYDROCHLOROTHIAZIDE 25 MG/1
TABLET ORAL
Qty: 90 TABLET | Refills: 0 | Status: SHIPPED | OUTPATIENT
Start: 2021-11-29

## 2021-11-29 NOTE — TELEPHONE ENCOUNTER
No protocol for prednisone.      Hypertension Medications Protocol Passed 11/28/2021 09:35 AM   Protocol Details  CMP or BMP in past 12 months    Last serum creatinine< 2.0    Appointment in past 6 or next 3 months        Last office visit:  11/15/21  Last

## 2021-11-29 NOTE — TELEPHONE ENCOUNTER
Requested Prescriptions     Pending Prescriptions Disp Refills   • ALPRAZOLAM 1 MG Oral Tab [Pharmacy Med Name: ALPRAZOLAM 1MG TABLETS] 45 tablet 0     Sig: TAKE 1/2 TABLET(0.5 MG) BY MOUTH THREE TIMES DAILY AS NEEDED FOR ANXIETY OR SLEEP   • CYCLOBENZAPRI

## 2021-11-30 ENCOUNTER — TELEPHONE (OUTPATIENT)
Dept: FAMILY MEDICINE CLINIC | Facility: CLINIC | Age: 61
End: 2021-11-30

## 2021-11-30 RX ORDER — CYCLOBENZAPRINE HCL 10 MG
TABLET ORAL
Qty: 90 TABLET | Refills: 0 | Status: SHIPPED | OUTPATIENT
Start: 2021-11-30 | End: 2022-01-11

## 2021-11-30 RX ORDER — ALPRAZOLAM 1 MG/1
TABLET ORAL
Qty: 45 TABLET | Refills: 0 | Status: SHIPPED | OUTPATIENT
Start: 2021-11-30 | End: 2022-01-11

## 2021-11-30 RX ORDER — HYDROCODONE BITARTRATE AND ACETAMINOPHEN 5; 325 MG/1; MG/1
1-2 TABLET ORAL EVERY 4 HOURS PRN
Qty: 30 TABLET | Refills: 0 | Status: SHIPPED | OUTPATIENT
Start: 2021-11-30 | End: 2022-02-01

## 2021-11-30 NOTE — TELEPHONE ENCOUNTER
Pt advised that per report, additional views of the right breast are needed. She will call mammogram dept back and schedule appt.

## 2021-11-30 NOTE — TELEPHONE ENCOUNTER
Last refill: 09/29/21  Qty: 30  W/ 0 refills  Last ov: 11/15/21    Requested Prescriptions     Pending Prescriptions Disp Refills   • HYDROcodone-acetaminophen 5-325 MG Oral Tab 30 tablet 0     Sig: Take 1-2 tablets by mouth every 4 (four) hours as needed

## 2021-12-03 RX ORDER — WARFARIN SODIUM 5 MG/1
TABLET ORAL
Qty: 90 TABLET | Refills: 0 | OUTPATIENT
Start: 2021-12-03

## 2021-12-03 NOTE — TELEPHONE ENCOUNTER
Requested Prescriptions     Refused Prescriptions Disp Refills   • WARFARIN 5 MG Oral Tab [Pharmacy Med Name: WARFARIN SOD 5MG TABLETS (PEACH)] 90 tablet 0     Sig: TAKE 1 TABLET BY MOUTH ALONG WITH 4MG TO EQUAL 9MG NIGHTLY     Patient is on 8 mg (4mg tabs

## 2022-01-07 ENCOUNTER — TELEPHONE (OUTPATIENT)
Dept: FAMILY MEDICINE CLINIC | Facility: CLINIC | Age: 62
End: 2022-01-07

## 2022-01-07 NOTE — TELEPHONE ENCOUNTER
Patient informed that it is recommended that she have additional views of right breast. Patient wants to have US done. Advised that if additional views warrant US, then that would be next step.  Patient will call and schedule test.

## 2022-01-11 RX ORDER — CYCLOBENZAPRINE HCL 10 MG
TABLET ORAL
Qty: 90 TABLET | Refills: 0 | Status: SHIPPED | OUTPATIENT
Start: 2022-01-11

## 2022-01-11 RX ORDER — ALPRAZOLAM 1 MG/1
TABLET ORAL
Qty: 45 TABLET | Refills: 0 | Status: SHIPPED | OUTPATIENT
Start: 2022-01-11

## 2022-01-15 RX ORDER — BENAZEPRIL HYDROCHLORIDE 20 MG/1
TABLET ORAL
Qty: 180 TABLET | Refills: 0 | Status: SHIPPED | OUTPATIENT
Start: 2022-01-15

## 2022-01-15 NOTE — TELEPHONE ENCOUNTER
LOV 11/15/2021    LAST LAB 11/24/2021    LAST RX 04/19/2021 180 tablets 0 refills    Next OV   Future Appointments   Date Time Provider Meredith Jeter   1/19/2022 11:00 AM [facility] RM3 [address] [facility]   7/13/2829 98:05 AM Annie Quinones MD [facility]

## 2022-01-27 ENCOUNTER — TELEPHONE (OUTPATIENT)
Dept: FAMILY MEDICINE CLINIC | Facility: CLINIC | Age: 62
End: 2022-01-27

## 2022-01-27 DIAGNOSIS — Z71.89 ENCOUNTER FOR FAMILY COUNSELING: Primary | ICD-10-CM

## 2022-01-27 NOTE — TELEPHONE ENCOUNTER
Pt advised she will be contacted by the referral dept regarding family counseling referrals. Verbalized understanding.

## 2022-01-29 NOTE — TELEPHONE ENCOUNTER
Nilo Pop RN; Madhuri Peng,     I received your navigation order for behavioral health services.  I have reached out to your patient and left a message with my contact information, as well as our 24/7 crisi

## 2022-02-01 RX ORDER — HYDROCODONE BITARTRATE AND ACETAMINOPHEN 5; 325 MG/1; MG/1
1-2 TABLET ORAL EVERY 4 HOURS PRN
Qty: 30 TABLET | Refills: 0 | Status: SHIPPED | OUTPATIENT
Start: 2022-02-01 | End: 2022-03-04

## 2022-02-07 RX ORDER — WARFARIN SODIUM 1 MG/1
TABLET ORAL
Qty: 180 TABLET | Refills: 0 | Status: SHIPPED | OUTPATIENT
Start: 2022-02-07

## 2022-02-09 ENCOUNTER — HOSPITAL ENCOUNTER (OUTPATIENT)
Dept: MAMMOGRAPHY | Facility: HOSPITAL | Age: 62
Discharge: HOME | End: 2022-02-09
Attending: FAMILY MEDICINE
Payer: MEDICARE

## 2022-02-09 DIAGNOSIS — R92.2 INCONCLUSIVE MAMMOGRAM: ICD-10-CM

## 2022-02-09 PROCEDURE — 77061 BREAST TOMOSYNTHESIS UNI: CPT | Performed by: FAMILY MEDICINE

## 2022-02-09 PROCEDURE — 77065 DX MAMMO INCL CAD UNI: CPT | Performed by: FAMILY MEDICINE

## 2022-02-09 RX ORDER — ALPRAZOLAM 1 MG/1
TABLET ORAL
Qty: 45 TABLET | Refills: 0 | Status: SHIPPED | OUTPATIENT
Start: 2022-02-09 | End: 2022-03-07

## 2022-02-09 NOTE — TELEPHONE ENCOUNTER
LOV: 11/15/21    LAST LAB: 11/15/21    LAST RX: 1/11/22    Next OV:   Future Appointments   Date Time Provider Meredith Jeter   2/9/2022 12:40 PM [facility] RM1 [address] [facility], [place]   2/14/2022  3:00 PM [facility] NURSE [facility] [place]   1/31/3615 57:87 AM Jamison Frost MD [facility] Mika Boss       PROTOCOL: n/a

## 2022-02-10 RX ORDER — WARFARIN SODIUM 4 MG/1
TABLET ORAL
Qty: 180 TABLET | Refills: 0 | Status: SHIPPED | OUTPATIENT
Start: 2022-02-10 | End: 2022-02-14

## 2022-02-10 NOTE — TELEPHONE ENCOUNTER
LOV: 11/15/21    LAST LAB: 11/24/21 - INR 2.0; due for INR. She has scheduled an appt.      LAST RX: 2/7/22    Next OV:   Future Appointments   Date Time Provider Meredith Jeter   2/14/2022  3:00 PM [facility] NURSE [facility] [place]   8/18/5129 18:09 AM Yaritza Nieves MD [facility] Arlene [place]dez       PROTOCOL: n/a

## 2022-02-14 ENCOUNTER — ANTI-COAG VISIT (OUTPATIENT)
Dept: FAMILY MEDICINE CLINIC | Facility: CLINIC | Age: 62
End: 2022-02-14
Payer: MEDICARE

## 2022-02-14 DIAGNOSIS — Z79.01 ENCOUNTER FOR CURRENT LONG-TERM USE OF ANTICOAGULANTS: Primary | ICD-10-CM

## 2022-02-14 LAB — INR: 1.9 (ref 0.8–1.2)

## 2022-02-14 PROCEDURE — 85610 PROTHROMBIN TIME: CPT | Performed by: FAMILY MEDICINE

## 2022-02-14 PROCEDURE — 93793 ANTICOAG MGMT PT WARFARIN: CPT | Performed by: FAMILY MEDICINE

## 2022-02-14 RX ORDER — WARFARIN SODIUM 1 MG/1
0.5 TABLET ORAL NIGHTLY
Refills: 0 | COMMUNITY
Start: 2022-02-14

## 2022-02-14 RX ORDER — WARFARIN SODIUM 4 MG/1
8 TABLET ORAL NIGHTLY
Qty: 180 TABLET | Refills: 0 | COMMUNITY
Start: 2022-02-14

## 2022-02-14 NOTE — PROGRESS NOTES
Andrea Roberts is here for an INR check. She is taking coumadin 8mg nightly. Blood sample collected without complications. INR 1.9 - results routed to Dr. Viral Rojas for review.

## 2022-02-21 ENCOUNTER — ANTI-COAG VISIT (OUTPATIENT)
Dept: FAMILY MEDICINE CLINIC | Facility: CLINIC | Age: 62
End: 2022-02-21
Payer: MEDICARE

## 2022-02-21 DIAGNOSIS — Z86.711 HISTORY OF PULMONARY EMBOLUS (PE): ICD-10-CM

## 2022-02-21 DIAGNOSIS — Z86.2 H/O HYPERCOAGULABLE STATE: ICD-10-CM

## 2022-02-21 LAB — INR: 2.2 (ref 0.8–1.2)

## 2022-02-21 PROCEDURE — 85610 PROTHROMBIN TIME: CPT | Performed by: FAMILY MEDICINE

## 2022-02-21 PROCEDURE — 93793 ANTICOAG MGMT PT WARFARIN: CPT | Performed by: FAMILY MEDICINE

## 2022-03-04 RX ORDER — HYDROCODONE BITARTRATE AND ACETAMINOPHEN 5; 325 MG/1; MG/1
1-2 TABLET ORAL EVERY 4 HOURS PRN
Qty: 30 TABLET | Refills: 0 | Status: SHIPPED | OUTPATIENT
Start: 2022-03-04

## 2022-03-04 NOTE — TELEPHONE ENCOUNTER
Last refill on alprazolam #45 on 2/9/2022  Last refill on cyclobenzaprine #90 on 1/11/2022  Last refill on prednisone #90 on 11/29/2021  Lat refill on atenolol #90 on 9/7/2021    Last office visit pertaining to refill on 11/15/2021  Future Appointments   Date Time Provider Meredith Jeter   5/27/6565 00:35 AM Yaritza Nieves MD [facility]     BP Readings from Last 3 Encounters:  11/16/21 : Prieto Beckwith  11/15/21 : 104/70  09/22/21 : 120/74      Labs current

## 2022-03-07 RX ORDER — ALPRAZOLAM 1 MG/1
TABLET ORAL
Qty: 45 TABLET | Refills: 0 | Status: SHIPPED | OUTPATIENT
Start: 2022-03-07 | End: 2022-04-04

## 2022-03-07 RX ORDER — CYCLOBENZAPRINE HCL 10 MG
TABLET ORAL
Qty: 90 TABLET | Refills: 0 | Status: SHIPPED | OUTPATIENT
Start: 2022-03-07

## 2022-03-07 RX ORDER — PREDNISONE 20 MG/1
TABLET ORAL
Qty: 90 TABLET | Refills: 0 | Status: SHIPPED | OUTPATIENT
Start: 2022-03-07

## 2022-03-07 RX ORDER — ATENOLOL 25 MG/1
TABLET ORAL
Qty: 90 TABLET | Refills: 0 | Status: SHIPPED | OUTPATIENT
Start: 2022-03-07

## 2022-03-23 ENCOUNTER — TELEPHONE (OUTPATIENT)
Dept: FAMILY MEDICINE CLINIC | Facility: CLINIC | Age: 62
End: 2022-03-23

## 2022-03-23 NOTE — TELEPHONE ENCOUNTER
[employer] is really calling Accuretic because of a slight increased risk of cancer. Is not clear whether it is the Accupril part or the hydrochlorothiazide part. Hydrochlorothiazide by itself is a very low increased risk of causing nonmelanoma skin cancer. The risk of the hydrochlorothiazide causing cancer is probably less than the risk of eating grilled hamburgers. On the other hand, Andrea Roberts is on a very low-dose and if she feels more comfortable she can stop taking it.

## 2022-03-23 NOTE — TELEPHONE ENCOUNTER
Pt heard today on the news that hydroCHLOROthiazide 25 MG Oral Tab causes cancer. She wanted to discuss further.

## 2022-03-26 ENCOUNTER — ANTI-COAG VISIT (OUTPATIENT)
Dept: FAMILY MEDICINE CLINIC | Facility: CLINIC | Age: 62
End: 2022-03-26
Payer: MEDICARE

## 2022-03-26 LAB — INR: 2.2 (ref 0.8–1.2)

## 2022-03-26 PROCEDURE — 85610 PROTHROMBIN TIME: CPT | Performed by: FAMILY MEDICINE

## 2022-04-04 RX ORDER — ALPRAZOLAM 1 MG/1
0.5 TABLET ORAL 3 TIMES DAILY PRN
Qty: 45 TABLET | Refills: 0 | Status: SHIPPED | OUTPATIENT
Start: 2022-04-04

## 2022-04-23 ENCOUNTER — ANTI-COAG VISIT (OUTPATIENT)
Dept: FAMILY MEDICINE CLINIC | Facility: CLINIC | Age: 62
End: 2022-04-23
Payer: MEDICARE

## 2022-04-23 LAB — INR: 2.4 (ref 0.8–1.2)

## 2022-04-23 PROCEDURE — 85610 PROTHROMBIN TIME: CPT | Performed by: FAMILY MEDICINE

## 2022-04-23 PROCEDURE — 93793 ANTICOAG MGMT PT WARFARIN: CPT | Performed by: FAMILY MEDICINE

## 2022-04-23 NOTE — PROGRESS NOTES
Pt presents today for the need of an INR recheck. Capillary puncture performed on right hand 3rd digit. Pt tolerated well. Value given. Bandage applied. Pt left office in stable condition.

## 2022-05-04 RX ORDER — ALPRAZOLAM 1 MG/1
0.5 TABLET ORAL 3 TIMES DAILY PRN
Qty: 45 TABLET | Refills: 0 | Status: SHIPPED | OUTPATIENT
Start: 2022-05-04

## 2022-05-04 RX ORDER — HYDROCODONE BITARTRATE AND ACETAMINOPHEN 5; 325 MG/1; MG/1
1-2 TABLET ORAL EVERY 4 HOURS PRN
Qty: 30 TABLET | Refills: 0 | Status: SHIPPED | OUTPATIENT
Start: 2022-05-04

## 2022-05-10 ENCOUNTER — TELEPHONE (OUTPATIENT)
Dept: FAMILY MEDICINE CLINIC | Facility: CLINIC | Age: 62
End: 2022-05-10

## 2022-05-10 RX ORDER — WARFARIN SODIUM 1 MG/1
0.5 TABLET ORAL NIGHTLY
Qty: 45 TABLET | Refills: 0 | Status: SHIPPED | OUTPATIENT
Start: 2022-05-10

## 2022-05-10 RX ORDER — WARFARIN SODIUM 1 MG/1
TABLET ORAL
Qty: 180 TABLET | Refills: 0 | OUTPATIENT
Start: 2022-05-10

## 2022-05-10 RX ORDER — WARFARIN SODIUM 4 MG/1
8 TABLET ORAL NIGHTLY
Qty: 180 TABLET | Refills: 0 | Status: SHIPPED | OUTPATIENT
Start: 2022-05-10

## 2022-05-10 RX ORDER — CLINDAMYCIN HYDROCHLORIDE 300 MG/1
300 CAPSULE ORAL 3 TIMES DAILY
Qty: 21 CAPSULE | Refills: 0 | Status: SHIPPED | OUTPATIENT
Start: 2022-05-10 | End: 2022-05-13

## 2022-05-10 NOTE — TELEPHONE ENCOUNTER
Warfarin refilled  Last seen 11/01/2021    Future Appointments   Date Time Provider Meredith Steffany   9/67/5216 19:56 AM Osmin Olmstead MD [facility] [place]

## 2022-05-10 NOTE — TELEPHONE ENCOUNTER
SHE IS ASKING FOR AN ANTIBIOTIC FOR A BROKEN TOOTH AND THE FILLING FELL OUT. SHE IS TRYING TO FIND A DENTIST THAT ACCEPTS HER INSURANCE.    SHE DOES NOT HAVE A WAY TO GET HERE TODAY FOR AN APPT

## 2022-05-13 ENCOUNTER — TELEPHONE (OUTPATIENT)
Dept: FAMILY MEDICINE CLINIC | Facility: CLINIC | Age: 62
End: 2022-05-13

## 2022-05-13 RX ORDER — CEFPROZIL 250 MG/1
250 TABLET, FILM COATED ORAL 2 TIMES DAILY
Qty: 14 TABLET | Refills: 0 | Status: SHIPPED | OUTPATIENT
Start: 2022-05-13 | End: 2022-05-20

## 2022-05-13 NOTE — TELEPHONE ENCOUNTER
PATIENT WAS PUT ON AN ANTIBOTIC CLINDAMYCIN 300 MG FOR TOOTH PAIN/ IT UPSETS HER STOMACH AND GIVE HER DIARREHA CAN SHE GET SOMETHING DIFFERENT / Tanner LATIF

## 2022-05-13 NOTE — TELEPHONE ENCOUNTER
Spoke with patient who was prescribed Clindamycin 300 mg TID qty 21. On 5/10/22 for tooth pain. She states it has been causing her stomach to burn and she is also having diarrhea. She is taking it with food. She would like something different called in. She has allergies to Augmentin, Cipro, and Sulfa antibiotics. She states an antibiotic she took early last year or the year before worked well. I see Cefprozil 250 mg BID x 7 days. Would this be something that would work?

## 2022-05-14 NOTE — TELEPHONE ENCOUNTER
Should have testing for Cdiff an stool culture. Increased risk with Clindamycin. Order placed. Please have patient  stool kit.

## 2022-05-17 ENCOUNTER — OFFICE VISIT (OUTPATIENT)
Dept: RHEUMATOLOGY | Facility: CLINIC | Age: 62
End: 2022-05-17
Payer: MEDICARE

## 2022-05-17 VITALS
OXYGEN SATURATION: 96 % | WEIGHT: 293 LBS | BODY MASS INDEX: 44.41 KG/M2 | SYSTOLIC BLOOD PRESSURE: 112 MMHG | HEART RATE: 77 BPM | TEMPERATURE: 98 F | DIASTOLIC BLOOD PRESSURE: 70 MMHG | HEIGHT: 68 IN

## 2022-05-17 DIAGNOSIS — M32.9 SYSTEMIC LUPUS ERYTHEMATOSUS, UNSPECIFIED SLE TYPE, UNSPECIFIED ORGAN INVOLVEMENT STATUS (HCC): Primary | ICD-10-CM

## 2022-05-17 DIAGNOSIS — M79.7 FIBROMYALGIA: ICD-10-CM

## 2022-05-17 PROCEDURE — 99214 OFFICE O/P EST MOD 30 MIN: CPT | Performed by: INTERNAL MEDICINE

## 2022-05-17 RX ORDER — HYDROXYCHLOROQUINE SULFATE 200 MG/1
TABLET, FILM COATED ORAL
Qty: 180 TABLET | Refills: 3 | Status: SHIPPED | OUTPATIENT
Start: 2022-05-17

## 2022-05-17 RX ORDER — PREDNISONE 10 MG/1
10 TABLET ORAL DAILY
Qty: 90 TABLET | Refills: 3 | Status: SHIPPED | OUTPATIENT
Start: 2022-05-17

## 2022-05-17 NOTE — PATIENT INSTRUCTIONS
Plaquenil 200 mg twice a day for lupus. Most recent lupus test, SIRIA was negative. That is encouraging. Continue prednisone 10 mg/day. Unfortunately the combination of Coumadin and prednisone that you are on the do have the bruising marks on her forearm. If you start feeling better try to lower your prednisone from 10 mg a day to 5 mg a day. Since you are on Coumadin you cannot take any NSAIDs. No ibuprofen or Aleve. For pain you can take extra strength Tylenol 500 mg 1 or 2 at a time up to 4 a day. For severe pain you can take Norco, regular alternate would be to take a CBD gummy once in a while. If you start taking medical marijuana regularly and call me and we would authorize for you to get a [facility] medical marijuana card for discount. Return to office in 1 year for recheck. Routine follow-up with your primary doctor.

## 2022-05-24 ENCOUNTER — OFFICE VISIT (OUTPATIENT)
Dept: FAMILY MEDICINE CLINIC | Facility: CLINIC | Age: 62
End: 2022-05-24
Payer: MEDICARE

## 2022-05-24 ENCOUNTER — LABORATORY ENCOUNTER (OUTPATIENT)
Dept: LAB | Age: 62
End: 2022-05-24
Attending: FAMILY MEDICINE
Payer: MEDICARE

## 2022-05-24 VITALS
HEIGHT: 68 IN | SYSTOLIC BLOOD PRESSURE: 120 MMHG | TEMPERATURE: 97 F | BODY MASS INDEX: 44.14 KG/M2 | RESPIRATION RATE: 18 BRPM | DIASTOLIC BLOOD PRESSURE: 80 MMHG | WEIGHT: 291.25 LBS | OXYGEN SATURATION: 97 % | HEART RATE: 68 BPM

## 2022-05-24 DIAGNOSIS — I10 PRIMARY HYPERTENSION: ICD-10-CM

## 2022-05-24 DIAGNOSIS — R73.01 IFG (IMPAIRED FASTING GLUCOSE): ICD-10-CM

## 2022-05-24 DIAGNOSIS — I10 ESSENTIAL HYPERTENSION: Primary | ICD-10-CM

## 2022-05-24 DIAGNOSIS — M32.9 SYSTEMIC LUPUS ERYTHEMATOSUS, UNSPECIFIED SLE TYPE, UNSPECIFIED ORGAN INVOLVEMENT STATUS (HCC): ICD-10-CM

## 2022-05-24 DIAGNOSIS — I10 ESSENTIAL HYPERTENSION: ICD-10-CM

## 2022-05-24 DIAGNOSIS — I26.99 PULMONARY EMBOLISM, OTHER, UNSPECIFIED CHRONICITY, UNSPECIFIED WHETHER ACUTE COR PULMONALE PRESENT (HCC): ICD-10-CM

## 2022-05-24 DIAGNOSIS — N18.31 STAGE 3A CHRONIC KIDNEY DISEASE (HCC): ICD-10-CM

## 2022-05-24 LAB
ALBUMIN SERPL-MCNC: 3.6 G/DL (ref 3.4–5)
ALBUMIN/GLOB SERPL: 1 {RATIO} (ref 1–2)
ALP LIVER SERPL-CCNC: 66 U/L
ALT SERPL-CCNC: 19 U/L
ANION GAP SERPL CALC-SCNC: 5 MMOL/L (ref 0–18)
AST SERPL-CCNC: 10 U/L (ref 15–37)
BASOPHILS # BLD AUTO: 0.07 X10(3) UL (ref 0–0.2)
BASOPHILS NFR BLD AUTO: 0.6 %
BILIRUB SERPL-MCNC: 0.3 MG/DL (ref 0.1–2)
BUN BLD-MCNC: 15 MG/DL (ref 7–18)
CALCIUM BLD-MCNC: 9.6 MG/DL (ref 8.5–10.1)
CHLORIDE SERPL-SCNC: 104 MMOL/L (ref 98–112)
CO2 SERPL-SCNC: 28 MMOL/L (ref 21–32)
CREAT BLD-MCNC: 1.27 MG/DL
EOSINOPHIL # BLD AUTO: 0.11 X10(3) UL (ref 0–0.7)
EOSINOPHIL NFR BLD AUTO: 0.9 %
ERYTHROCYTE [DISTWIDTH] IN BLOOD BY AUTOMATED COUNT: 13.7 %
EST. AVERAGE GLUCOSE BLD GHB EST-MCNC: 120 MG/DL (ref 68–126)
FASTING STATUS PATIENT QL REPORTED: YES
GLOBULIN PLAS-MCNC: 3.7 G/DL (ref 2.8–4.4)
GLUCOSE BLD-MCNC: 112 MG/DL (ref 70–99)
HBA1C MFR BLD: 5.8 % (ref ?–5.7)
HCT VFR BLD AUTO: 47.1 %
HGB BLD-MCNC: 15.5 G/DL
IMM GRANULOCYTES # BLD AUTO: 0.11 X10(3) UL (ref 0–1)
IMM GRANULOCYTES NFR BLD: 0.9 %
INR: 2.2 (ref 0.8–1.2)
LYMPHOCYTES # BLD AUTO: 0.92 X10(3) UL (ref 1–4)
LYMPHOCYTES NFR BLD AUTO: 7.6 %
MCH RBC QN AUTO: 31.7 PG (ref 26–34)
MCHC RBC AUTO-ENTMCNC: 32.9 G/DL (ref 31–37)
MCV RBC AUTO: 96.3 FL
MONOCYTES # BLD AUTO: 0.73 X10(3) UL (ref 0.1–1)
MONOCYTES NFR BLD AUTO: 6 %
NEUTROPHILS # BLD AUTO: 10.22 X10 (3) UL (ref 1.5–7.7)
NEUTROPHILS # BLD AUTO: 10.22 X10(3) UL (ref 1.5–7.7)
NEUTROPHILS NFR BLD AUTO: 84 %
OSMOLALITY SERPL CALC.SUM OF ELEC: 286 MOSM/KG (ref 275–295)
PLATELET # BLD AUTO: 224 10(3)UL (ref 150–450)
POTASSIUM SERPL-SCNC: 4.7 MMOL/L (ref 3.5–5.1)
PROT SERPL-MCNC: 7.3 G/DL (ref 6.4–8.2)
RBC # BLD AUTO: 4.89 X10(6)UL
SODIUM SERPL-SCNC: 137 MMOL/L (ref 136–145)
WBC # BLD AUTO: 12.2 X10(3) UL (ref 4–11)

## 2022-05-24 PROCEDURE — 83036 HEMOGLOBIN GLYCOSYLATED A1C: CPT

## 2022-05-24 PROCEDURE — 36415 COLL VENOUS BLD VENIPUNCTURE: CPT

## 2022-05-24 PROCEDURE — 99214 OFFICE O/P EST MOD 30 MIN: CPT | Performed by: FAMILY MEDICINE

## 2022-05-24 PROCEDURE — 85025 COMPLETE CBC W/AUTO DIFF WBC: CPT

## 2022-05-24 PROCEDURE — 85610 PROTHROMBIN TIME: CPT | Performed by: FAMILY MEDICINE

## 2022-05-24 PROCEDURE — 80053 COMPREHEN METABOLIC PANEL: CPT

## 2022-05-25 DIAGNOSIS — I10 ESSENTIAL HYPERTENSION: ICD-10-CM

## 2022-05-25 DIAGNOSIS — R73.01 IFG (IMPAIRED FASTING GLUCOSE): Primary | ICD-10-CM

## 2022-05-31 DIAGNOSIS — I10 ESSENTIAL HYPERTENSION: ICD-10-CM

## 2022-05-31 RX ORDER — HYDROCHLOROTHIAZIDE 25 MG/1
12.5 TABLET ORAL
Qty: 18 TABLET | Refills: 1 | Status: SHIPPED | OUTPATIENT
Start: 2022-06-01 | End: 2022-11-28

## 2022-06-02 DIAGNOSIS — I10 ESSENTIAL HYPERTENSION: ICD-10-CM

## 2022-06-02 RX ORDER — ATENOLOL 25 MG/1
25 TABLET ORAL DAILY
Qty: 90 TABLET | Refills: 0 | Status: SHIPPED | OUTPATIENT
Start: 2022-06-02

## 2022-06-02 NOTE — TELEPHONE ENCOUNTER
LOV: 5/24/22    Last Refill: 3/7/22 #90 refills 0    Last Labs: 5/24/22    Medication refilled per protocol    Future Appointments   Date Time Provider Meredith Steffany   6/9/2022  2:00 PM Yoanna To DO [facility] [place]   9/13/2022 11:30 AM Ollie Medrano MD [facility] [place]   6/10/4871 70:57 AM [facility] [place]     Hypertension Medications Protocol Passed 06/02/2022 11:50 AM   Protocol Details  CMP or BMP in past 12 months    Last serum creatinine< 2.0    Appointment in past 6 or next 3 months

## 2022-06-08 RX ORDER — ALPRAZOLAM 1 MG/1
0.5 TABLET ORAL 3 TIMES DAILY PRN
Qty: 45 TABLET | Refills: 0 | Status: SHIPPED | OUTPATIENT
Start: 2022-06-08

## 2022-06-08 RX ORDER — CYCLOBENZAPRINE HCL 10 MG
10 TABLET ORAL 3 TIMES DAILY PRN
Qty: 90 TABLET | Refills: 0 | Status: SHIPPED | OUTPATIENT
Start: 2022-06-08

## 2022-06-08 NOTE — TELEPHONE ENCOUNTER
No protocol for medications.     Last office visit:    Alprazolam:  05/04/22   #45, no refills  Cyclobenzaprine:  03/07/22  #90, no refills    Future Appointments   Date Time Provider Meredith Dardeni   6/9/2022  2:00 PM America Arevalo DO [facility] [place]   9/13/2022 11:30 AM Zaria Medrano MD [facility] [place]   8/22/0262 70:74 AM [facility]abtree [facility] LORIN [facility]

## 2022-06-09 ENCOUNTER — OFFICE VISIT (OUTPATIENT)
Dept: FAMILY MEDICINE CLINIC | Facility: CLINIC | Age: 62
End: 2022-06-09
Payer: MEDICARE

## 2022-06-09 VITALS
DIASTOLIC BLOOD PRESSURE: 60 MMHG | TEMPERATURE: 97 F | HEIGHT: 68 IN | RESPIRATION RATE: 18 BRPM | BODY MASS INDEX: 44.12 KG/M2 | OXYGEN SATURATION: 95 % | HEART RATE: 86 BPM | WEIGHT: 291.13 LBS | SYSTOLIC BLOOD PRESSURE: 120 MMHG

## 2022-06-09 DIAGNOSIS — M32.9 SYSTEMIC LUPUS ERYTHEMATOSUS, UNSPECIFIED SLE TYPE, UNSPECIFIED ORGAN INVOLVEMENT STATUS (HCC): ICD-10-CM

## 2022-06-09 DIAGNOSIS — J44.1 CHRONIC OBSTRUCTIVE PULMONARY DISEASE WITH ACUTE EXACERBATION (HCC): ICD-10-CM

## 2022-06-09 DIAGNOSIS — I10 ESSENTIAL HYPERTENSION: Primary | ICD-10-CM

## 2022-06-09 LAB — INR: 1.6 (ref 0.8–1.2)

## 2022-06-09 RX ORDER — HYDROCODONE BITARTRATE AND ACETAMINOPHEN 5; 325 MG/1; MG/1
1-2 TABLET ORAL EVERY 4 HOURS PRN
Qty: 30 TABLET | Refills: 0 | Status: SHIPPED | OUTPATIENT
Start: 2022-06-09

## 2022-06-09 NOTE — PROGRESS NOTES
Updated the anticoagulation tracking. Changed to 9mg nightly and recheck in 1 week per Dr. Salomón Durand.     Future Appointments   Date Time Provider Meredith Steffany   6/17/2022 10:00 AM [facility] NURSE [facility] [place]   9/13/2022 11:30 AM Daron Medrano MD [facility] [place]   4/88/3361 37:74 AM [facility]as [facility]

## 2022-06-15 ENCOUNTER — TELEPHONE (OUTPATIENT)
Dept: FAMILY MEDICINE CLINIC | Facility: CLINIC | Age: 62
End: 2022-06-15

## 2022-06-15 NOTE — TELEPHONE ENCOUNTER
Patient states has history of Lupus, now is experiencing not only joint pain but muscle pain. Also has developed occasional HA's. Patient thinks she would benefit from seeing neurologist. Advised evaluation with Dr. Jasmina Foster. Appt scheduled.   Future Appointments   Date Time Provider Meredith Steffany   6/17/2022 10:00 AM [facility] NURSE [facility] [place]   6/22/2022  2:30 PM Aneta Estrada MD [facility] [place]   9/13/2022 11:30 AM Aneta Estrada MD [facility] [place]   3/83/3839 72:70 AM [facility]

## 2022-06-15 NOTE — TELEPHONE ENCOUNTER
Pt needs to see neurologist.  She wanted to see Dr Tong Collins. She wanted to know what she needed to do.

## 2022-06-17 ENCOUNTER — NURSE ONLY (OUTPATIENT)
Dept: FAMILY MEDICINE CLINIC | Facility: CLINIC | Age: 62
End: 2022-06-17
Payer: MEDICARE

## 2022-06-17 DIAGNOSIS — Z79.01 LONG TERM (CURRENT) USE OF ANTICOAGULANTS: Primary | ICD-10-CM

## 2022-06-17 LAB — INR: 2.5 (ref 0.8–1.2)

## 2022-06-17 PROCEDURE — 93793 ANTICOAG MGMT PT WARFARIN: CPT | Performed by: FAMILY MEDICINE

## 2022-06-17 PROCEDURE — 85610 PROTHROMBIN TIME: CPT

## 2022-06-23 ENCOUNTER — OFFICE VISIT (OUTPATIENT)
Dept: FAMILY MEDICINE CLINIC | Facility: CLINIC | Age: 62
End: 2022-06-23
Payer: MEDICARE

## 2022-06-23 ENCOUNTER — TELEPHONE (OUTPATIENT)
Dept: FAMILY MEDICINE CLINIC | Facility: CLINIC | Age: 62
End: 2022-06-23

## 2022-06-23 VITALS
HEIGHT: 68 IN | DIASTOLIC BLOOD PRESSURE: 78 MMHG | BODY MASS INDEX: 44.1 KG/M2 | RESPIRATION RATE: 20 BRPM | HEART RATE: 77 BPM | WEIGHT: 291 LBS | TEMPERATURE: 98 F | SYSTOLIC BLOOD PRESSURE: 120 MMHG | OXYGEN SATURATION: 94 %

## 2022-06-23 DIAGNOSIS — J44.1 CHRONIC OBSTRUCTIVE PULMONARY DISEASE WITH ACUTE EXACERBATION (HCC): ICD-10-CM

## 2022-06-23 DIAGNOSIS — E66.01 CLASS 3 SEVERE OBESITY DUE TO EXCESS CALORIES WITH SERIOUS COMORBIDITY AND BODY MASS INDEX (BMI) OF 40.0 TO 44.9 IN ADULT (HCC): ICD-10-CM

## 2022-06-23 DIAGNOSIS — M32.9 SYSTEMIC LUPUS ERYTHEMATOSUS, UNSPECIFIED SLE TYPE, UNSPECIFIED ORGAN INVOLVEMENT STATUS (HCC): ICD-10-CM

## 2022-06-23 DIAGNOSIS — Z79.01 ANTICOAGULATED: Primary | ICD-10-CM

## 2022-06-23 DIAGNOSIS — Z79.01 ENCOUNTER FOR CURRENT LONG-TERM USE OF ANTICOAGULANTS: ICD-10-CM

## 2022-06-23 LAB — INR: 2.1 (ref 0.8–1.2)

## 2022-06-23 PROCEDURE — 99214 OFFICE O/P EST MOD 30 MIN: CPT | Performed by: INTERNAL MEDICINE

## 2022-06-23 PROCEDURE — 85610 PROTHROMBIN TIME: CPT | Performed by: INTERNAL MEDICINE

## 2022-06-23 RX ORDER — HYDROCODONE BITARTRATE AND ACETAMINOPHEN 5; 325 MG/1; MG/1
1-2 TABLET ORAL EVERY 4 HOURS PRN
Qty: 30 TABLET | Refills: 0 | Status: SHIPPED | OUTPATIENT
Start: 2022-06-23

## 2022-06-23 RX ORDER — ALPRAZOLAM 1 MG/1
0.5 TABLET ORAL 3 TIMES DAILY PRN
Qty: 45 TABLET | Refills: 0 | Status: SHIPPED | OUTPATIENT
Start: 2022-06-23

## 2022-06-23 NOTE — TELEPHONE ENCOUNTER
Spoke with patient - instructed to have INR rechecked in 1 month and continue on 9mg per Dr. Marcella Vick  Pt verbalized understanding.

## 2022-07-06 ENCOUNTER — MED REC SCAN ONLY (OUTPATIENT)
Dept: FAMILY MEDICINE CLINIC | Facility: CLINIC | Age: 62
End: 2022-07-06

## 2022-07-15 ENCOUNTER — TELEPHONE (OUTPATIENT)
Dept: RHEUMATOLOGY | Facility: CLINIC | Age: 62
End: 2022-07-15

## 2022-07-15 NOTE — TELEPHONE ENCOUNTER
Called pt, explained Dr Sid Champagne completed his part for the cannabis program, pt will need to complete her portion.
Clear

## 2022-07-15 NOTE — TELEPHONE ENCOUNTER
Last refill: 1/15/22 #180 w/ 0 refills    Last OV: 6/23/22  Last labs: 5/24/22    Future Appointments   Date Time Provider Meredith Dardeni   9/13/2022 11:30 AM Delfino Packer MD [facility] [place]   7/19/9631 24:86 AM Lon Tucker MD [facility] [place]

## 2022-07-18 RX ORDER — BENAZEPRIL HYDROCHLORIDE 20 MG/1
20 TABLET ORAL DAILY
Qty: 90 TABLET | Refills: 1 | Status: SHIPPED | OUTPATIENT
Start: 2022-07-18

## 2022-07-18 NOTE — TELEPHONE ENCOUNTER
Hypertension Medications Protocol Passed 07/15/2022 03:07 PM   Protocol Details  CMP or BMP in past 12 months    Last serum creatinine< 2.0    Appointment in past 6 or next 3 months     Refilled per protocol

## 2022-08-01 ENCOUNTER — ANTI-COAG VISIT (OUTPATIENT)
Dept: FAMILY MEDICINE CLINIC | Facility: CLINIC | Age: 62
End: 2022-08-01
Payer: MEDICARE

## 2022-08-01 DIAGNOSIS — Z79.01 ENCOUNTER FOR CURRENT LONG-TERM USE OF ANTICOAGULANTS: ICD-10-CM

## 2022-08-01 LAB — INR: 2.4 (ref 0.8–1.2)

## 2022-08-01 RX ORDER — WARFARIN SODIUM 1 MG/1
TABLET ORAL
Qty: 45 TABLET | Refills: 0 | COMMUNITY
Start: 2022-08-01

## 2022-08-01 RX ORDER — WARFARIN SODIUM 4 MG/1
TABLET ORAL
Qty: 180 TABLET | Refills: 0 | COMMUNITY
Start: 2022-08-01

## 2022-08-05 NOTE — TELEPHONE ENCOUNTER
Last office visit: 6/23/22  Last refill: 6/8/22  Labs Due: 11/25/22  Future Appointments   Date Time Provider Meredith Jeter   9/13/2022 11:30 AM Carmelo Wolf MD [facility] [place]   4/38/7351 24:16 AM Reza Guzman MD [facility] [place]

## 2022-08-06 RX ORDER — CYCLOBENZAPRINE HCL 10 MG
10 TABLET ORAL 3 TIMES DAILY PRN
Qty: 90 TABLET | Refills: 0 | Status: SHIPPED | OUTPATIENT
Start: 2022-08-06

## 2022-08-11 ENCOUNTER — TELEPHONE (OUTPATIENT)
Dept: FAMILY MEDICINE CLINIC | Facility: CLINIC | Age: 62
End: 2022-08-11

## 2022-08-11 RX ORDER — ALPRAZOLAM 1 MG/1
0.5 TABLET ORAL 3 TIMES DAILY PRN
Qty: 45 TABLET | Refills: 0 | Status: SHIPPED | OUTPATIENT
Start: 2022-08-11

## 2022-08-11 RX ORDER — WARFARIN SODIUM 5 MG/1
TABLET ORAL
Qty: 90 TABLET | Refills: 0 | Status: SHIPPED | OUTPATIENT
Start: 2022-08-11

## 2022-08-15 NOTE — TELEPHONE ENCOUNTER
Patient advised, referral placed. Tissue Cultured Epidermal Autograft Text: The defect edges were debeveled with a #15 scalpel blade.  Given the location of the defect, shape of the defect and the proximity to free margins a tissue cultured epidermal autograft was deemed most appropriate.  The graft was then trimmed to fit the size of the defect.  The graft was then placed in the primary defect and oriented appropriately.

## 2022-08-22 ENCOUNTER — TELEPHONE (OUTPATIENT)
Dept: FAMILY MEDICINE CLINIC | Facility: CLINIC | Age: 62
End: 2022-08-22

## 2022-08-22 DIAGNOSIS — R92.8 ABNORMAL MAMMOGRAM: Primary | ICD-10-CM

## 2022-08-22 NOTE — TELEPHONE ENCOUNTER
Last screening bilateral mammogram was done 11/29/21  Repeat views were don on the right 2/9/22. Diagnostic 2D3D of right ordered.  V.O. Dr Megan Velez RN

## 2022-08-24 RX ORDER — PREDNISONE 10 MG/1
10 TABLET ORAL DAILY
Qty: 90 TABLET | Refills: 3 | OUTPATIENT
Start: 2022-08-24

## 2022-08-24 NOTE — TELEPHONE ENCOUNTER
No protocol for medication    Last office visit:  06/23/22  Last refill:  05/17/22  #90, 3 refills       Future Appointments   Date Time Provider Meredith Steffany   9/13/2022 11:30 AM Sourav Hernandez MD [facility] [place]   2/13/3494 52:52 AM Cas Whelan MD [facility] [place]

## 2022-09-06 RX ORDER — ALPRAZOLAM 1 MG/1
0.5 TABLET ORAL 3 TIMES DAILY PRN
Qty: 45 TABLET | Refills: 0 | Status: SHIPPED | OUTPATIENT
Start: 2022-09-06 | End: 2022-12-01

## 2022-09-06 RX ORDER — CYCLOBENZAPRINE HCL 10 MG
10 TABLET ORAL 3 TIMES DAILY PRN
Qty: 90 TABLET | Refills: 0 | Status: SHIPPED | OUTPATIENT
Start: 2022-09-06

## 2022-09-13 ENCOUNTER — LAB ENCOUNTER (OUTPATIENT)
Dept: LAB | Age: 62
End: 2022-09-13
Attending: INTERNAL MEDICINE
Payer: MEDICARE

## 2022-09-13 ENCOUNTER — OFFICE VISIT (OUTPATIENT)
Dept: FAMILY MEDICINE CLINIC | Facility: CLINIC | Age: 62
End: 2022-09-13
Payer: MEDICARE

## 2022-09-13 VITALS
HEIGHT: 68 IN | SYSTOLIC BLOOD PRESSURE: 128 MMHG | RESPIRATION RATE: 18 BRPM | HEART RATE: 74 BPM | BODY MASS INDEX: 44.41 KG/M2 | TEMPERATURE: 98 F | WEIGHT: 293 LBS | OXYGEN SATURATION: 98 % | DIASTOLIC BLOOD PRESSURE: 74 MMHG

## 2022-09-13 DIAGNOSIS — G89.29 CHRONIC RIGHT-SIDED LOW BACK PAIN WITH RIGHT-SIDED SCIATICA: ICD-10-CM

## 2022-09-13 DIAGNOSIS — Z79.01 LONG TERM (CURRENT) USE OF ANTICOAGULANTS: ICD-10-CM

## 2022-09-13 DIAGNOSIS — G62.9 NEUROPATHY: ICD-10-CM

## 2022-09-13 DIAGNOSIS — M48.061 SPINAL STENOSIS OF LUMBAR REGION WITHOUT NEUROGENIC CLAUDICATION: ICD-10-CM

## 2022-09-13 DIAGNOSIS — N18.31 STAGE 3A CHRONIC KIDNEY DISEASE (HCC): ICD-10-CM

## 2022-09-13 DIAGNOSIS — I10 PRIMARY HYPERTENSION: ICD-10-CM

## 2022-09-13 DIAGNOSIS — M54.41 CHRONIC RIGHT-SIDED LOW BACK PAIN WITH RIGHT-SIDED SCIATICA: ICD-10-CM

## 2022-09-13 DIAGNOSIS — E55.9 VITAMIN D DEFICIENCY: ICD-10-CM

## 2022-09-13 DIAGNOSIS — Z86.2 H/O HYPERCOAGULABLE STATE: ICD-10-CM

## 2022-09-13 DIAGNOSIS — Z00.00 ENCOUNTER FOR ANNUAL HEALTH EXAMINATION: Primary | ICD-10-CM

## 2022-09-13 DIAGNOSIS — J44.9 CHRONIC OBSTRUCTIVE PULMONARY DISEASE, UNSPECIFIED COPD TYPE (HCC): ICD-10-CM

## 2022-09-13 DIAGNOSIS — E27.40 ADRENAL INSUFFICIENCY (HCC): ICD-10-CM

## 2022-09-13 DIAGNOSIS — Z00.00 PREVENTATIVE HEALTH CARE: ICD-10-CM

## 2022-09-13 DIAGNOSIS — Z79.01 ANTICOAGULATED: ICD-10-CM

## 2022-09-13 DIAGNOSIS — I10 ESSENTIAL HYPERTENSION: ICD-10-CM

## 2022-09-13 DIAGNOSIS — R73.01 IFG (IMPAIRED FASTING GLUCOSE): ICD-10-CM

## 2022-09-13 DIAGNOSIS — M32.13 OTHER SYSTEMIC LUPUS ERYTHEMATOSUS WITH LUNG INVOLVEMENT (HCC): ICD-10-CM

## 2022-09-13 DIAGNOSIS — E66.01 CLASS 3 SEVERE OBESITY DUE TO EXCESS CALORIES WITH SERIOUS COMORBIDITY AND BODY MASS INDEX (BMI) OF 40.0 TO 44.9 IN ADULT (HCC): ICD-10-CM

## 2022-09-13 DIAGNOSIS — J44.1 CHRONIC OBSTRUCTIVE PULMONARY DISEASE WITH ACUTE EXACERBATION (HCC): ICD-10-CM

## 2022-09-13 LAB
ALBUMIN SERPL-MCNC: 3.5 G/DL (ref 3.4–5)
ALBUMIN/GLOB SERPL: 1 {RATIO} (ref 1–2)
ALP LIVER SERPL-CCNC: 60 U/L
ALT SERPL-CCNC: 19 U/L
ANION GAP SERPL CALC-SCNC: 4 MMOL/L (ref 0–18)
AST SERPL-CCNC: 13 U/L (ref 15–37)
BASOPHILS # BLD AUTO: 0.04 X10(3) UL (ref 0–0.2)
BASOPHILS NFR BLD AUTO: 0.4 %
BILIRUB SERPL-MCNC: 0.3 MG/DL (ref 0.1–2)
BUN BLD-MCNC: 11 MG/DL (ref 7–18)
CALCIUM BLD-MCNC: 9 MG/DL (ref 8.5–10.1)
CHLORIDE SERPL-SCNC: 108 MMOL/L (ref 98–112)
CHOLEST SERPL-MCNC: 175 MG/DL (ref ?–200)
CO2 SERPL-SCNC: 27 MMOL/L (ref 21–32)
CREAT BLD-MCNC: 1.11 MG/DL
EOSINOPHIL # BLD AUTO: 0.05 X10(3) UL (ref 0–0.7)
EOSINOPHIL NFR BLD AUTO: 0.5 %
ERYTHROCYTE [DISTWIDTH] IN BLOOD BY AUTOMATED COUNT: 14.6 %
FASTING PATIENT LIPID ANSWER: YES
FASTING STATUS PATIENT QL REPORTED: YES
FOLATE SERPL-MCNC: 22.7 NG/ML (ref 8.7–?)
GFR SERPLBLD BASED ON 1.73 SQ M-ARVRAT: 56 ML/MIN/1.73M2 (ref 60–?)
GLOBULIN PLAS-MCNC: 3.4 G/DL (ref 2.8–4.4)
GLUCOSE BLD-MCNC: 124 MG/DL (ref 70–99)
HCT VFR BLD AUTO: 44.4 %
HDLC SERPL-MCNC: 79 MG/DL (ref 40–59)
HGB BLD-MCNC: 14.1 G/DL
IMM GRANULOCYTES # BLD AUTO: 0.1 X10(3) UL (ref 0–1)
IMM GRANULOCYTES NFR BLD: 1 %
INR BLD: 2.71 (ref 0.85–1.16)
LDLC SERPL CALC-MCNC: 75 MG/DL (ref ?–100)
LYMPHOCYTES # BLD AUTO: 1.09 X10(3) UL (ref 1–4)
LYMPHOCYTES NFR BLD AUTO: 10.7 %
MCH RBC QN AUTO: 31.5 PG (ref 26–34)
MCHC RBC AUTO-ENTMCNC: 31.8 G/DL (ref 31–37)
MCV RBC AUTO: 99.1 FL
MONOCYTES # BLD AUTO: 0.6 X10(3) UL (ref 0.1–1)
MONOCYTES NFR BLD AUTO: 5.9 %
NEUTROPHILS # BLD AUTO: 8.3 X10 (3) UL (ref 1.5–7.7)
NEUTROPHILS # BLD AUTO: 8.3 X10(3) UL (ref 1.5–7.7)
NEUTROPHILS NFR BLD AUTO: 81.5 %
NONHDLC SERPL-MCNC: 96 MG/DL (ref ?–130)
OSMOLALITY SERPL CALC.SUM OF ELEC: 289 MOSM/KG (ref 275–295)
PLATELET # BLD AUTO: 231 10(3)UL (ref 150–450)
POTASSIUM SERPL-SCNC: 4.2 MMOL/L (ref 3.5–5.1)
PROT SERPL-MCNC: 6.9 G/DL (ref 6.4–8.2)
PROTHROMBIN TIME: 28.4 SECONDS (ref 11.6–14.8)
RBC # BLD AUTO: 4.48 X10(6)UL
SODIUM SERPL-SCNC: 139 MMOL/L (ref 136–145)
TRIGL SERPL-MCNC: 123 MG/DL (ref 30–149)
VIT B12 SERPL-MCNC: 285 PG/ML (ref 193–986)
VIT D+METAB SERPL-MCNC: 47.7 NG/ML (ref 30–100)
VLDLC SERPL CALC-MCNC: 19 MG/DL (ref 0–30)
WBC # BLD AUTO: 10.2 X10(3) UL (ref 4–11)

## 2022-09-13 PROCEDURE — 36415 COLL VENOUS BLD VENIPUNCTURE: CPT

## 2022-09-13 PROCEDURE — 82607 VITAMIN B-12: CPT

## 2022-09-13 PROCEDURE — G0439 PPPS, SUBSEQ VISIT: HCPCS | Performed by: INTERNAL MEDICINE

## 2022-09-13 PROCEDURE — 85025 COMPLETE CBC W/AUTO DIFF WBC: CPT

## 2022-09-13 PROCEDURE — 80053 COMPREHEN METABOLIC PANEL: CPT

## 2022-09-13 PROCEDURE — 82306 VITAMIN D 25 HYDROXY: CPT

## 2022-09-13 PROCEDURE — 85610 PROTHROMBIN TIME: CPT

## 2022-09-13 PROCEDURE — 82746 ASSAY OF FOLIC ACID SERUM: CPT

## 2022-09-13 PROCEDURE — 80061 LIPID PANEL: CPT

## 2022-09-16 ENCOUNTER — TELEPHONE (OUTPATIENT)
Dept: FAMILY MEDICINE CLINIC | Facility: CLINIC | Age: 62
End: 2022-09-16

## 2022-09-26 ENCOUNTER — TELEPHONE (OUTPATIENT)
Facility: CLINIC | Age: 62
End: 2022-09-26

## 2022-09-26 DIAGNOSIS — R91.1 PULMONARY NODULE: Primary | ICD-10-CM

## 2022-09-27 DIAGNOSIS — R91.1 LUNG NODULE: Primary | ICD-10-CM

## 2022-10-03 ENCOUNTER — TELEPHONE (OUTPATIENT)
Dept: FAMILY MEDICINE CLINIC | Facility: CLINIC | Age: 62
End: 2022-10-03

## 2022-10-03 DIAGNOSIS — M32.9 SYSTEMIC LUPUS ERYTHEMATOSUS, UNSPECIFIED SLE TYPE, UNSPECIFIED ORGAN INVOLVEMENT STATUS (HCC): ICD-10-CM

## 2022-10-03 RX ORDER — ALPRAZOLAM 1 MG/1
0.5 TABLET ORAL 3 TIMES DAILY PRN
Qty: 45 TABLET | Refills: 0 | Status: CANCELLED | OUTPATIENT
Start: 2022-10-03

## 2022-10-03 RX ORDER — ALPRAZOLAM 1 MG/1
0.5 TABLET ORAL 3 TIMES DAILY PRN
Qty: 45 TABLET | Refills: 0 | Status: SHIPPED | OUTPATIENT
Start: 2022-10-03

## 2022-10-03 RX ORDER — HYDROCODONE BITARTRATE AND ACETAMINOPHEN 5; 325 MG/1; MG/1
1-2 TABLET ORAL EVERY 4 HOURS PRN
Qty: 30 TABLET | Refills: 0 | Status: SHIPPED | OUTPATIENT
Start: 2022-10-03

## 2022-10-03 NOTE — TELEPHONE ENCOUNTER
KYLERI:    SHE HAS HER MAMMO APPT ON THE 24TH AND THE MRI IS SCHEDULED FOR THE 31ST.     ALSO SHE WANTS TO HAVE HER CYDROCODONE AND LORAZEPAM REFILLED AT [address]

## 2022-10-04 ENCOUNTER — TELEPHONE (OUTPATIENT)
Facility: CLINIC | Age: 62
End: 2022-10-04

## 2022-10-04 NOTE — TELEPHONE ENCOUNTER
Polly Schuster is going to shut off patients electricity on 10/9 unless they receive a letter from the doctor that she needs the power due to her nebulizer.  The fax number for the letter is [phone]

## 2022-10-04 NOTE — TELEPHONE ENCOUNTER
Refill request denied as medication was refilled in Telephone message yesterday 10/3/22.   Mahendra ALBRECHT MA, 10/04/22, 5:22 AM

## 2022-10-05 ENCOUNTER — TELEPHONE (OUTPATIENT)
Facility: CLINIC | Age: 62
End: 2022-10-05

## 2022-10-05 NOTE — TELEPHONE ENCOUNTER
Pt called and informed us that [employer] received electricity letter but it was not signed by EK. Letter printed, signed by EK and faxed to [phone]. Pt notified.

## 2022-10-27 NOTE — TELEPHONE ENCOUNTER
PEDIATRIC ILLNESS VISIT   10/27/2022        Roomed by: Lauren Koo MD 11:16 AM      SUBJECTIVE    accompanied by mother  Roxana is a 33 month old female who is complaining of cold symptoms for more than week, and also had new fever overnight after 7 days of antibiotics.    Symptoms:  Fever: recurrent fever  Review of Systems   Constitutional: Negative for activity change and appetite change.   HENT: Positive for congestion and postnasal drip.    Eyes: Negative.    Respiratory: Positive for cough.    Cardiovascular: Negative.    Gastrointestinal: Negative.    Skin: Negative.      Allergies:  ALLERGIES:   Allergen Reactions   • Cashew   (Food Or Med) SWELLING   • Cefdinir DIARRHEA     Perfused diarrhea       Current Outpatient Medications   Medication Sig Dispense Refill   • azithromycin (Zithromax) 100 MG/5ML suspension 6 ml day 1 and then 3 ml day 2-5 18 mL 0   • amoxicillin (AMOXIL) 400 MG/5ML suspension 6 ml by mouth twice daily for 10 days 100 mL 0   • fluticasone (Flonase) 50 MCG/ACT nasal spray Spray 2 sprays in each nostril daily. 1 each 3   • hydroCORTisone (CORTIZONE) 2.5 % ointment Apply twice daily to affected areas for two weeks 60 g 2   • nystatin (MYCOSTATIN) 329095 UNIT/GM ointment Apply TID for 7-10 days 30 g 1   • polyethylene glycol (MIRALAX) 17 g packet      • LACTOBACILLUS RHAMNOSUS, GG, PO Take 13 mg by mouth daily.       No current facility-administered medications for this visit.       No family history on file.  No other family members have acute illness     Social history:   Attends school    OBJECTIVE:   Visit Vitals  Pulse 100   Temp 99.7 °F (37.6 °C) (Temporal)   Resp 24   Wt 11.1 kg (24 lb 6.4 oz)     Physical Exam  Constitutional:       Appearance: well-developed.   HENT:      Right Ear: A middle ear effusion is present. erythemic TM     Left Ear: A middle ear effusion is present. Slight erythema     Nose: Mucosal edema, congestion (thicken mucus) and rhinorrhea present.       Last office visit:  07/24/19  Last inr:  07/24/19  Last refill:  06/25/19   #30, no refills      No future appointments. Mouth/Throat:      Pharynx: Posterior oropharyngeal erythema present.   Eyes:      Conjunctiva/sclera: Conjunctivae normal.   Cardiovascular:      Rate and Rhythm: Normal rate and regular rhythm.   Pulmonary:      Effort: Pulmonary effort is normal.      Breath sounds: occassional upper airway congested sounds.   Abdominal:      General: Abdomen is flat. Bowel sounds are normal.      Palpations: Abdomen is soft.   Musculoskeletal:      Cervical back: Normal range of motion.   Skin:     General: Skin is warm.      Capillary Refill: Capillary refill takes less than 2 seconds.   Neurological:      Mental Status: He is alert.   Psychiatric:         Mood and Affect: Mood normal.         ASSESSMENT/PLAN    Bronchospasm with viral illness -  push fluids, get plenty of rest, nasal saline spray/wash/gel recommended, vaporizer recommended, OTC cough/cold medications recommended, discussed signs and symptoms of respiratory distress and to be seen, call if not improving after 7 days or if worsening at any time  No orders of the defined types were placed in this encounter.     Otitis:.................................Oral antibiotic and Ibuprofen or Tylenol for pain and temperature  URI:..................................Push fluids, Tylenol/Advil for fever, Call if worsens, Suction nose with bulb syringe, Saline nose drops tid to help remove mucous, Use vaporizer while sleeping and Discussed signs and symptoms of respiratory distress.      Medication changes: Yes.  Was advised on side effects and potential interactions of the new medication(s).  Was advised on the risks of not taking medication(s) or adhering to the medication schedule.    Immunizations given today? No.  See Orders:  Instructed to call if the problem worsens or does not improve within the next 24 to 48 hours.    Schedule follow-up: consider follow up in 10-14 days to see resolution, otherwise PRN    Parent informed of negative, flu and covid testing.           Medication changes: yes    Immunizations given today? No  See Orders:  Instructed to call if the problem worsens or does not improve within the next 24 to 48 hours.    Schedule follow-up: bonny Koo MD

## 2022-11-01 ENCOUNTER — TELEPHONE (OUTPATIENT)
Dept: FAMILY MEDICINE CLINIC | Facility: CLINIC | Age: 62
End: 2022-11-01

## 2022-11-01 DIAGNOSIS — M32.9 SYSTEMIC LUPUS ERYTHEMATOSUS, UNSPECIFIED SLE TYPE, UNSPECIFIED ORGAN INVOLVEMENT STATUS (HCC): ICD-10-CM

## 2022-11-01 RX ORDER — ALPRAZOLAM 1 MG/1
TABLET ORAL
Qty: 45 TABLET | Refills: 0 | Status: SHIPPED | OUTPATIENT
Start: 2022-11-01

## 2022-11-03 ENCOUNTER — TELEPHONE (OUTPATIENT)
Dept: FAMILY MEDICINE CLINIC | Facility: CLINIC | Age: 62
End: 2022-11-03

## 2022-11-04 ENCOUNTER — MED REC SCAN ONLY (OUTPATIENT)
Dept: FAMILY MEDICINE CLINIC | Facility: CLINIC | Age: 62
End: 2022-11-04

## 2022-11-07 RX ORDER — WARFARIN SODIUM 5 MG/1
TABLET ORAL
Qty: 90 TABLET | Refills: 0 | Status: SHIPPED | OUTPATIENT
Start: 2022-11-07

## 2022-11-08 ENCOUNTER — TELEPHONE (OUTPATIENT)
Facility: CLINIC | Age: 62
End: 2022-11-08

## 2022-11-08 ENCOUNTER — OFFICE VISIT (OUTPATIENT)
Facility: CLINIC | Age: 62
End: 2022-11-08
Payer: MEDICARE

## 2022-11-08 VITALS
BODY MASS INDEX: 47.98 KG/M2 | HEIGHT: 65 IN | HEART RATE: 68 BPM | OXYGEN SATURATION: 96 % | DIASTOLIC BLOOD PRESSURE: 86 MMHG | SYSTOLIC BLOOD PRESSURE: 112 MMHG | WEIGHT: 288 LBS | RESPIRATION RATE: 16 BRPM

## 2022-11-08 DIAGNOSIS — J44.1 CHRONIC OBSTRUCTIVE PULMONARY DISEASE WITH ACUTE EXACERBATION (HCC): Primary | ICD-10-CM

## 2022-11-08 DIAGNOSIS — Z72.0 TOBACCO ABUSE: ICD-10-CM

## 2022-11-08 DIAGNOSIS — R91.8 LUNG NODULES: ICD-10-CM

## 2022-11-08 PROCEDURE — 99214 OFFICE O/P EST MOD 30 MIN: CPT | Performed by: INTERNAL MEDICINE

## 2022-11-08 PROCEDURE — 99406 BEHAV CHNG SMOKING 3-10 MIN: CPT | Performed by: INTERNAL MEDICINE

## 2022-11-08 RX ORDER — IPRATROPIUM BROMIDE AND ALBUTEROL SULFATE 2.5; .5 MG/3ML; MG/3ML
3 SOLUTION RESPIRATORY (INHALATION) EVERY 6 HOURS PRN
Qty: 360 ML | Refills: 11 | Status: SHIPPED | OUTPATIENT
Start: 2022-11-08

## 2022-11-08 RX ORDER — ALBUTEROL SULFATE 90 UG/1
2 AEROSOL, METERED RESPIRATORY (INHALATION) EVERY 6 HOURS PRN
Qty: 1 EACH | Refills: 11 | Status: SHIPPED | OUTPATIENT
Start: 2022-11-08

## 2022-11-08 RX ORDER — BUDESONIDE AND FORMOTEROL FUMARATE DIHYDRATE 160; 4.5 UG/1; UG/1
2 AEROSOL RESPIRATORY (INHALATION) 2 TIMES DAILY PRN
Qty: 1 EACH | Refills: 11 | Status: SHIPPED | OUTPATIENT
Start: 2022-11-08

## 2022-11-08 RX ORDER — TIOTROPIUM BROMIDE AND OLODATEROL 3.124; 2.736 UG/1; UG/1
2 SPRAY, METERED RESPIRATORY (INHALATION) DAILY
Qty: 1 EACH | Refills: 11 | Status: SHIPPED | OUTPATIENT
Start: 2022-11-08

## 2022-11-08 NOTE — PATIENT INSTRUCTIONS
We reviewed your inhalers. I would typically change you to an inhaler such as trelegy or breztri, but your preference is to keep symbicort at this time. Let me know if you want me to change the inhalers. Use the albuterol inhaler or duoneb nebulizer every 6 hours as needed for your breathing. We reviewed your CT scan and would plan at this time to proceed with annual lung cancer screening low dose CT imaging. Work on quitting smoking - consider obtaining the nicotine inhaler by nicorette.    Keep working on avoiding allergens  Try to keep active and exercise  Consider seeing medical weight loss - http://www.denise.com/   https://www.nm.org/conditions-and-care-areas/weight-management/nonsurgical-[email or web address]-loss-[email or web address]

## 2022-11-08 NOTE — TELEPHONE ENCOUNTER
Pt's part D denied ipratropium albuterol. Release Rx to [facility] in [place] and left a message for them to run under Part B with a dx code of J44.9 COPD.

## 2022-11-14 ENCOUNTER — TELEPHONE (OUTPATIENT)
Dept: FAMILY MEDICINE CLINIC | Facility: CLINIC | Age: 62
End: 2022-11-14

## 2022-11-14 NOTE — TELEPHONE ENCOUNTER
Overall your back looks pretty good some mild degenerative changes and a small hemagioma in the L5 vertabra which should not cause pain.

## 2022-11-15 NOTE — TELEPHONE ENCOUNTER
Patient advised. She said she is still having difficulty walking. She will discuss the results with Dr Era Amor at her appointment Thursday.

## 2022-11-17 ENCOUNTER — OFFICE VISIT (OUTPATIENT)
Dept: FAMILY MEDICINE CLINIC | Facility: CLINIC | Age: 62
End: 2022-11-17
Payer: MEDICARE

## 2022-11-17 VITALS
SYSTOLIC BLOOD PRESSURE: 132 MMHG | HEART RATE: 79 BPM | DIASTOLIC BLOOD PRESSURE: 80 MMHG | RESPIRATION RATE: 18 BRPM | WEIGHT: 293 LBS | BODY MASS INDEX: 49 KG/M2 | TEMPERATURE: 98 F | OXYGEN SATURATION: 98 %

## 2022-11-17 DIAGNOSIS — N90.89 LABIAL LESION: ICD-10-CM

## 2022-11-17 DIAGNOSIS — Z78.0 POSTMENOPAUSAL: ICD-10-CM

## 2022-11-17 DIAGNOSIS — Z91.89 GYN EXAM FOR HIGH-RISK MEDICARE PATIENT: Primary | ICD-10-CM

## 2022-11-17 PROBLEM — F13.20 SEDATIVE, HYPNOTIC OR ANXIOLYTIC DEPENDENCE, UNCOMPLICATED (HCC): Status: ACTIVE | Noted: 2022-11-17

## 2022-11-17 LAB — INR: 3.9 (ref 0.8–1.2)

## 2022-11-17 PROCEDURE — 87624 HPV HI-RISK TYP POOLED RSLT: CPT | Performed by: INTERNAL MEDICINE

## 2022-11-25 DIAGNOSIS — I10 ESSENTIAL HYPERTENSION: ICD-10-CM

## 2022-11-25 RX ORDER — HYDROCHLOROTHIAZIDE 25 MG/1
TABLET ORAL
Qty: 18 TABLET | Refills: 1 | Status: SHIPPED | OUTPATIENT
Start: 2022-11-25

## 2022-11-25 NOTE — TELEPHONE ENCOUNTER
Hypertension Medications Protocol Passed 11/25/2022 10:05 AM   Protocol Details  CMP or BMP in past 12 months    Last serum creatinine< 2.0    Appointment in past 6 or next 3 months     Last refill - 6/1/22 - #18 with 1 refill  Last CMP - 9/13/22 - creatinine - 1.11  Last office visit - 11/17/22  Refilled per protocol

## 2022-11-28 ENCOUNTER — TELEPHONE (OUTPATIENT)
Dept: FAMILY MEDICINE CLINIC | Facility: CLINIC | Age: 62
End: 2022-11-28

## 2022-11-29 LAB — HPV I/H RISK 1 DNA SPEC QL NAA+PROBE: NEGATIVE

## 2022-12-01 DIAGNOSIS — M32.9 SYSTEMIC LUPUS ERYTHEMATOSUS, UNSPECIFIED SLE TYPE, UNSPECIFIED ORGAN INVOLVEMENT STATUS (HCC): ICD-10-CM

## 2022-12-01 RX ORDER — HYDROCODONE BITARTRATE AND ACETAMINOPHEN 5; 325 MG/1; MG/1
1-2 TABLET ORAL EVERY 4 HOURS PRN
Qty: 30 TABLET | Refills: 0 | Status: SHIPPED | OUTPATIENT
Start: 2022-12-01

## 2022-12-01 RX ORDER — ALPRAZOLAM 1 MG/1
0.5 TABLET ORAL 3 TIMES DAILY PRN
Qty: 45 TABLET | Refills: 0 | Status: SHIPPED | OUTPATIENT
Start: 2022-12-01

## 2022-12-01 NOTE — TELEPHONE ENCOUNTER
Last OV w/Dr Spencer Gotti 11/17/22  Last refill on Alprazolam 9/6/22 #45  Last refill on [place] 10/3/22 #30

## 2022-12-03 ENCOUNTER — ANTI-COAG VISIT (OUTPATIENT)
Dept: FAMILY MEDICINE CLINIC | Facility: CLINIC | Age: 62
End: 2022-12-03
Payer: MEDICARE

## 2022-12-03 LAB — INR: 2.3 (ref 0.8–1.2)

## 2022-12-03 PROCEDURE — 85610 PROTHROMBIN TIME: CPT | Performed by: INTERNAL MEDICINE

## 2022-12-03 PROCEDURE — 93793 ANTICOAG MGMT PT WARFARIN: CPT | Performed by: INTERNAL MEDICINE

## 2022-12-08 DIAGNOSIS — I10 ESSENTIAL HYPERTENSION: ICD-10-CM

## 2022-12-08 RX ORDER — ATENOLOL 25 MG/1
25 TABLET ORAL DAILY
Qty: 90 TABLET | Refills: 0 | Status: SHIPPED | OUTPATIENT
Start: 2022-12-08

## 2022-12-12 ENCOUNTER — TELEPHONE (OUTPATIENT)
Dept: FAMILY MEDICINE CLINIC | Facility: CLINIC | Age: 62
End: 2022-12-12

## 2022-12-29 ENCOUNTER — OFFICE VISIT (OUTPATIENT)
Dept: FAMILY MEDICINE CLINIC | Facility: CLINIC | Age: 62
End: 2022-12-29
Payer: MEDICARE

## 2022-12-29 VITALS
WEIGHT: 291 LBS | TEMPERATURE: 99 F | HEART RATE: 69 BPM | DIASTOLIC BLOOD PRESSURE: 82 MMHG | BODY MASS INDEX: 48 KG/M2 | SYSTOLIC BLOOD PRESSURE: 142 MMHG | RESPIRATION RATE: 20 BRPM | OXYGEN SATURATION: 94 %

## 2022-12-29 DIAGNOSIS — J02.9 SORE THROAT: ICD-10-CM

## 2022-12-29 DIAGNOSIS — J01.10 ACUTE NON-RECURRENT FRONTAL SINUSITIS: Primary | ICD-10-CM

## 2022-12-29 DIAGNOSIS — K12.0 ORAL APHTHOUS ULCER: ICD-10-CM

## 2022-12-29 DIAGNOSIS — R06.2 WHEEZING: ICD-10-CM

## 2022-12-29 DIAGNOSIS — R30.0 DYSURIA: ICD-10-CM

## 2022-12-29 LAB
APPEARANCE: CLEAR
BILIRUBIN: NEGATIVE
GLUCOSE (URINE DIPSTICK): NEGATIVE MG/DL
KETONES (URINE DIPSTICK): NEGATIVE MG/DL
LEUKOCYTES: NEGATIVE
MULTISTIX LOT#: NORMAL NUMERIC
NITRITE, URINE: NEGATIVE
OCCULT BLOOD: NEGATIVE
PH, URINE: 6 (ref 4.5–8)
PROTEIN (URINE DIPSTICK): NEGATIVE MG/DL
SPECIFIC GRAVITY: 1.02 (ref 1–1.03)
URINE-COLOR: YELLOW
UROBILINOGEN,SEMI-QN: 0.2 MG/DL (ref 0–1.9)

## 2022-12-29 PROCEDURE — 87086 URINE CULTURE/COLONY COUNT: CPT

## 2022-12-29 PROCEDURE — 87186 SC STD MICRODIL/AGAR DIL: CPT

## 2022-12-29 PROCEDURE — 87077 CULTURE AEROBIC IDENTIFY: CPT

## 2022-12-29 RX ORDER — CEFPROZIL 250 MG/1
250 TABLET, FILM COATED ORAL 2 TIMES DAILY
Qty: 14 TABLET | Refills: 0 | Status: SHIPPED | OUTPATIENT
Start: 2022-12-29 | End: 2023-01-05

## 2022-12-29 RX ORDER — PREDNISONE 10 MG/1
TABLET ORAL
Qty: 27 TABLET | Refills: 0 | Status: SHIPPED | OUTPATIENT
Start: 2022-12-29 | End: 2023-01-07

## 2022-12-29 RX ORDER — TRIAMCINOLONE ACETONIDE 0.1 %
1 PASTE (GRAM) DENTAL 2 TIMES DAILY
Qty: 5 G | Refills: 0 | Status: SHIPPED | OUTPATIENT
Start: 2022-12-29 | End: 2023-01-12

## 2022-12-30 RX ORDER — WARFARIN SODIUM 5 MG/1
TABLET ORAL
Qty: 90 TABLET | Refills: 0 | Status: SHIPPED | OUTPATIENT
Start: 2022-12-30

## 2023-01-04 DIAGNOSIS — Z79.01 ENCOUNTER FOR CURRENT LONG-TERM USE OF ANTICOAGULANTS: ICD-10-CM

## 2023-01-04 RX ORDER — ALPRAZOLAM 1 MG/1
0.5 TABLET ORAL 3 TIMES DAILY PRN
Qty: 45 TABLET | Refills: 0 | Status: SHIPPED | OUTPATIENT
Start: 2023-01-04

## 2023-01-04 RX ORDER — WARFARIN SODIUM 4 MG/1
TABLET ORAL
Qty: 180 TABLET | Refills: 0 | COMMUNITY
Start: 2023-01-04

## 2023-01-04 RX ORDER — WARFARIN SODIUM 4 MG/1
TABLET ORAL
Qty: 180 TABLET | Refills: 0 | Status: SHIPPED | OUTPATIENT
Start: 2023-01-04 | End: 2023-01-04

## 2023-01-04 NOTE — TELEPHONE ENCOUNTER
Patient is asking for a refill of Warfarin 4mg. She said she is currently taking two 4mg tabs to equal 8mg. She will have her INR checked when she feels better.

## 2023-01-18 ENCOUNTER — TELEPHONE (OUTPATIENT)
Dept: FAMILY MEDICINE CLINIC | Facility: CLINIC | Age: 63
End: 2023-01-18

## 2023-01-18 NOTE — TELEPHONE ENCOUNTER
Patient advised that there are no openings in the nurse schedule this Saturday. She said she will call us next week to make an appointment when she has a ride.

## 2023-01-19 ENCOUNTER — ANTI-COAG VISIT (OUTPATIENT)
Dept: FAMILY MEDICINE CLINIC | Facility: CLINIC | Age: 63
End: 2023-01-19
Payer: MEDICARE

## 2023-01-19 DIAGNOSIS — R30.0 DYSURIA: ICD-10-CM

## 2023-01-19 LAB — INR: 1.8 (ref 0.8–1.2)

## 2023-01-19 PROCEDURE — 87086 URINE CULTURE/COLONY COUNT: CPT | Performed by: INTERNAL MEDICINE

## 2023-01-19 PROCEDURE — 85610 PROTHROMBIN TIME: CPT

## 2023-01-19 PROCEDURE — 93793 ANTICOAG MGMT PT WARFARIN: CPT | Performed by: INTERNAL MEDICINE

## 2023-01-19 NOTE — PROGRESS NOTES
INR fingerstick done, patient advised to continue Coumadin 6mg right now as she may need to go on another antibiotic. Urine sent to lab for culture.

## 2023-01-25 RX ORDER — BENAZEPRIL HYDROCHLORIDE 20 MG/1
TABLET ORAL
Qty: 90 TABLET | Refills: 1 | Status: SHIPPED | OUTPATIENT
Start: 2023-01-25

## 2023-01-25 NOTE — TELEPHONE ENCOUNTER
Hypertension Medications Protocol Passed 01/25/2023 10:44 AM   Protocol Details  CMP or BMP in past 12 months    Last serum creatinine< 2.0    Appointment in past 6 or next 3 months        Last office visit: 12/29/22  Future Appointments   Date Time Provider Meredith Jeter   3/39/4193 85:54 AM Nakita Casas MD EM[facility]UM[facility]     [facility] 9/13/22 CREA 1.11  Last filled: 7/18/22 #90 with 1 refill

## 2023-01-30 DIAGNOSIS — M32.9 SYSTEMIC LUPUS ERYTHEMATOSUS, UNSPECIFIED SLE TYPE, UNSPECIFIED ORGAN INVOLVEMENT STATUS (HCC): ICD-10-CM

## 2023-01-30 RX ORDER — HYDROCODONE BITARTRATE AND ACETAMINOPHEN 5; 325 MG/1; MG/1
1-2 TABLET ORAL EVERY 4 HOURS PRN
Qty: 30 TABLET | Refills: 0 | Status: SHIPPED | OUTPATIENT
Start: 2023-01-30

## 2023-01-30 RX ORDER — CYCLOBENZAPRINE HCL 10 MG
TABLET ORAL
Qty: 90 TABLET | Refills: 0 | Status: SHIPPED | OUTPATIENT
Start: 2023-01-30

## 2023-01-30 RX ORDER — ALPRAZOLAM 1 MG/1
TABLET ORAL
Qty: 45 TABLET | Refills: 0 | Status: SHIPPED | OUTPATIENT
Start: 2023-01-30

## 2023-01-30 NOTE — TELEPHONE ENCOUNTER
Last refill -   Cyclobenzaprine - 9/6/22 - ##90  Alprazolam - 1/4/23 - #45  Last office visit - 11/17/22

## 2023-02-01 ENCOUNTER — TELEPHONE (OUTPATIENT)
Dept: FAMILY MEDICINE CLINIC | Facility: CLINIC | Age: 63
End: 2023-02-01

## 2023-02-01 ENCOUNTER — ANTI-COAG VISIT (OUTPATIENT)
Dept: FAMILY MEDICINE CLINIC | Facility: CLINIC | Age: 63
End: 2023-02-01
Payer: MEDICARE

## 2023-02-01 DIAGNOSIS — Z79.01 ENCOUNTER FOR CURRENT LONG-TERM USE OF ANTICOAGULANTS: ICD-10-CM

## 2023-02-01 LAB — INR: 1.5 (ref 0.8–1.2)

## 2023-02-01 RX ORDER — WARFARIN SODIUM 4 MG/1
TABLET ORAL
Qty: 180 TABLET | Refills: 0 | COMMUNITY
Start: 2023-02-01

## 2023-02-01 NOTE — TELEPHONE ENCOUNTER
Pt needs refill on LORazepam 0.5 MG Oral Tab. Has appt scheduled today and would like to p/u medication while she is in town.  [facility] in [place]

## 2023-02-01 NOTE — PROGRESS NOTES
Patient states she has been taking coumadin 7mg nightly. Advised to increase dose to 8mg and recheck INR in 1 week.  Order for home INR faxed to [facility] per patient's request.

## 2023-02-01 NOTE — TELEPHONE ENCOUNTER
Patient advised script was sent on 1/30/23 for #45 tablets. She said that she is not due to have it filled until 2/4/23 but wants to pick it up today. Patient advised that she will have to discuss with the pharmacy, this may be an insurance issue.

## 2023-02-01 NOTE — TELEPHONE ENCOUNTER
She said the pharmacy needs authorization for her to get the lorazepam today.   She can't make another trip back to [place] tomorrow to get it

## 2023-02-08 ENCOUNTER — ANTI-COAG VISIT (OUTPATIENT)
Dept: FAMILY MEDICINE CLINIC | Facility: CLINIC | Age: 63
End: 2023-02-08
Payer: MEDICARE

## 2023-02-08 LAB — INR: 2.5 (ref 0.8–1.2)

## 2023-02-08 PROCEDURE — 93793 ANTICOAG MGMT PT WARFARIN: CPT | Performed by: INTERNAL MEDICINE

## 2023-02-08 PROCEDURE — 85610 PROTHROMBIN TIME: CPT

## 2023-02-08 NOTE — PROGRESS NOTES
INR fingerstick done. Patient advised to continue Coumadin 8mg and recheck INR in 1 week with the home INR company.

## 2023-03-01 RX ORDER — BUDESONIDE AND FORMOTEROL FUMARATE DIHYDRATE 160; 4.5 UG/1; UG/1
AEROSOL RESPIRATORY (INHALATION)
Qty: 10.2 G | Refills: 3 | Status: SHIPPED | OUTPATIENT
Start: 2023-03-01

## 2023-03-03 ENCOUNTER — TELEPHONE (OUTPATIENT)
Facility: CLINIC | Age: 63
End: 2023-03-03

## 2023-03-03 RX ORDER — UMECLIDINIUM BROMIDE AND VILANTEROL TRIFENATATE 62.5; 25 UG/1; UG/1
1 POWDER RESPIRATORY (INHALATION) DAILY
Qty: 1 EACH | Refills: 11 | Status: SHIPPED | OUTPATIENT
Start: 2023-03-03

## 2023-03-03 NOTE — PROGRESS NOTES
Per insurance notice, symbicort no longer covered. Approved meds: anoro, breo, serevent and spiriva.    Will order anoro - will update patient

## 2023-03-06 ENCOUNTER — TELEPHONE (OUTPATIENT)
Facility: CLINIC | Age: 63
End: 2023-03-06

## 2023-03-06 RX ORDER — CYCLOBENZAPRINE HCL 10 MG
TABLET ORAL
Qty: 90 TABLET | Refills: 0 | Status: SHIPPED | OUTPATIENT
Start: 2023-03-06

## 2023-03-06 RX ORDER — ALPRAZOLAM 1 MG/1
TABLET ORAL
Qty: 45 TABLET | Refills: 0 | Status: SHIPPED | OUTPATIENT
Start: 2023-03-06

## 2023-03-06 NOTE — TELEPHONE ENCOUNTER
----- Message from Nerissa Felipe MD sent at 3/3/2023  8:18 AM CST -----  Please let patient know her insurance will no longer cover symbicort, but will cover anoro, breo, spiriva and serevent.   I ordered anoro for her to start in place of symbicort

## 2023-03-08 ENCOUNTER — TELEPHONE (OUTPATIENT)
Dept: FAMILY MEDICINE CLINIC | Facility: CLINIC | Age: 63
End: 2023-03-08

## 2023-03-20 DIAGNOSIS — M32.9 SYSTEMIC LUPUS ERYTHEMATOSUS, UNSPECIFIED SLE TYPE, UNSPECIFIED ORGAN INVOLVEMENT STATUS (HCC): ICD-10-CM

## 2023-03-20 RX ORDER — HYDROCODONE BITARTRATE AND ACETAMINOPHEN 5; 325 MG/1; MG/1
1-2 TABLET ORAL EVERY 4 HOURS PRN
Qty: 30 TABLET | Refills: 0 | Status: SHIPPED | OUTPATIENT
Start: 2023-03-20

## 2023-03-20 NOTE — TELEPHONE ENCOUNTER
PT REQUESTING REFILL ON HYDROcodone-acetaminophen 5-325 MG Oral Tab. PT HURT HER KNEE ON 3/6/23 THEN WAS SEEN BY ORTHO. NO FRACTURE BUT SHE DOES HAVE LIQUID BEHIND KNEE AND BONE ON BONE. ASKING FOR EARLY REFILL as SHE IS IN A LOT OF PAIN. [facility].

## 2023-03-30 ENCOUNTER — TELEPHONE (OUTPATIENT)
Dept: FAMILY MEDICINE CLINIC | Facility: CLINIC | Age: 63
End: 2023-03-30

## 2023-03-30 LAB — INR: 2.1 (ref 0.8–1.2)

## 2023-04-03 DIAGNOSIS — I10 ESSENTIAL HYPERTENSION: ICD-10-CM

## 2023-04-03 DIAGNOSIS — Z79.01 ENCOUNTER FOR CURRENT LONG-TERM USE OF ANTICOAGULANTS: ICD-10-CM

## 2023-04-03 RX ORDER — WARFARIN SODIUM 4 MG/1
TABLET ORAL
Qty: 180 TABLET | Refills: 0 | Status: SHIPPED | OUTPATIENT
Start: 2023-04-03

## 2023-04-03 RX ORDER — ATENOLOL 25 MG/1
TABLET ORAL
Qty: 90 TABLET | Refills: 0 | Status: SHIPPED | OUTPATIENT
Start: 2023-04-03

## 2023-04-04 ENCOUNTER — MED REC SCAN ONLY (OUTPATIENT)
Dept: FAMILY MEDICINE CLINIC | Facility: CLINIC | Age: 63
End: 2023-04-04

## 2023-04-08 RX ORDER — ALPRAZOLAM 1 MG/1
TABLET ORAL
Qty: 45 TABLET | Refills: 0 | Status: SHIPPED | OUTPATIENT
Start: 2023-04-08

## 2023-04-08 NOTE — TELEPHONE ENCOUNTER
ANIRUDH:[id number]    LAST LAB:9-13-22    LAST RX:  Medication Quantity Refills Start End   ALPRAZOLAM 1 MG Oral Tab 45 tablet 0 3/6/2023    Sig: Tanisha Duboise 1/2 TABLET(0.5 MG) BY MOUTH THREE TIMES DAILY AS NEEDED            Next OV:  Future Appointments   Date Time Provider Meredith Jeter   2/59/3154 87:91 AM Joel Caraballo MD [facility]            PROTOCOL: none

## 2023-04-20 ENCOUNTER — TELEPHONE (OUTPATIENT)
Dept: FAMILY MEDICINE CLINIC | Facility: CLINIC | Age: 63
End: 2023-04-20

## 2023-04-28 RX ORDER — HYDROXYCHLOROQUINE SULFATE 200 MG/1
TABLET, FILM COATED ORAL
Qty: 180 TABLET | Refills: 0 | Status: SHIPPED | OUTPATIENT
Start: 2023-04-28

## 2023-04-28 NOTE — TELEPHONE ENCOUNTER
Future Appointments   Date Time Provider Meredith Jeter   4/71/9109 26:31 AM Franne Angelucci, MD [facility]  5/17/2022    Last refill 5/17/2022  180 tabs, 3 refills

## 2023-05-10 RX ORDER — ALPRAZOLAM 1 MG/1
TABLET ORAL
Qty: 45 TABLET | Refills: 0 | Status: SHIPPED | OUTPATIENT
Start: 2023-05-10

## 2023-05-10 NOTE — TELEPHONE ENCOUNTER
ALPRAZOLAM 1 MG Oral Tab     Last office visit:  11/17/22    Future Appointments   Date Time Provider Meredith Jeter   2/87/4620 07:99 AM Nakita Casas MD EMGTREYDORON [facility]ervantes     Last filled:  4/8/23  #45  With 0 refills   Last labs:  9/13/22

## 2023-05-11 DIAGNOSIS — M32.9 SYSTEMIC LUPUS ERYTHEMATOSUS, UNSPECIFIED SLE TYPE, UNSPECIFIED ORGAN INVOLVEMENT STATUS (HCC): ICD-10-CM

## 2023-05-11 RX ORDER — HYDROCODONE BITARTRATE AND ACETAMINOPHEN 5; 325 MG/1; MG/1
1-2 TABLET ORAL EVERY 4 HOURS PRN
Qty: 30 TABLET | Refills: 0 | Status: SHIPPED | OUTPATIENT
Start: 2023-05-11

## 2023-05-11 NOTE — TELEPHONE ENCOUNTER
HYDROcodone-acetaminophen 5-325 MG Oral Tab    Last office visit:  9/13/22   Future Appointments   Date Time Provider Meredith Dardeni   9/66/0062 98:50 AM Annie Quinones MD [facility]     Last filled:  3/20/23  #30 with 0 refills   Last labs:  9/13/22

## 2023-05-12 ENCOUNTER — TELEPHONE (OUTPATIENT)
Dept: FAMILY MEDICINE CLINIC | Facility: CLINIC | Age: 63
End: 2023-05-12

## 2023-05-12 NOTE — TELEPHONE ENCOUNTER
Script cancelled at [facility] and sent to [address] [place] [address] in [place] per patient request.

## 2023-05-12 NOTE — TELEPHONE ENCOUNTER
Dr called in a script for head lice. [facility] didn't have it. She wanted to discuss something else that she can use. Pharmacy called other locations & they didn't have it either.

## 2023-05-15 ENCOUNTER — TELEPHONE (OUTPATIENT)
Dept: FAMILY MEDICINE CLINIC | Facility: CLINIC | Age: 63
End: 2023-05-15

## 2023-05-15 NOTE — TELEPHONE ENCOUNTER
Pt has a appt with Dr Serena Galicia tomorrow at 8. She needs Dr Elyse burnette's faxed over to Brad's office. Fax# [phone].  Call pt to let her know if ok to fax

## 2023-05-15 NOTE — TELEPHONE ENCOUNTER
Patient advised that we do not have the consult notes from Dr Mallory Reinoso and that we cannot send another physician's notes, she will need to contact their office.

## 2023-05-16 ENCOUNTER — OFFICE VISIT (OUTPATIENT)
Dept: RHEUMATOLOGY | Facility: CLINIC | Age: 63
End: 2023-05-16
Payer: MEDICARE

## 2023-05-16 VITALS
DIASTOLIC BLOOD PRESSURE: 89 MMHG | BODY MASS INDEX: 48.32 KG/M2 | HEIGHT: 65 IN | WEIGHT: 290 LBS | OXYGEN SATURATION: 95 % | SYSTOLIC BLOOD PRESSURE: 130 MMHG | HEART RATE: 60 BPM

## 2023-05-16 DIAGNOSIS — M17.0 PRIMARY OSTEOARTHRITIS OF BOTH KNEES: ICD-10-CM

## 2023-05-16 DIAGNOSIS — Z79.01 CHRONIC ANTICOAGULATION: ICD-10-CM

## 2023-05-16 DIAGNOSIS — E66.01 CLASS 3 SEVERE OBESITY DUE TO EXCESS CALORIES WITH SERIOUS COMORBIDITY AND BODY MASS INDEX (BMI) OF 40.0 TO 44.9 IN ADULT (HCC): ICD-10-CM

## 2023-05-16 DIAGNOSIS — Z86.2 H/O HYPERCOAGULABLE STATE: ICD-10-CM

## 2023-05-16 DIAGNOSIS — M32.13 OTHER SYSTEMIC LUPUS ERYTHEMATOSUS WITH LUNG INVOLVEMENT (HCC): Primary | ICD-10-CM

## 2023-05-16 PROCEDURE — 99214 OFFICE O/P EST MOD 30 MIN: CPT | Performed by: INTERNAL MEDICINE

## 2023-05-16 RX ORDER — PHENTERMINE HYDROCHLORIDE 30 MG/1
30 CAPSULE ORAL EVERY MORNING
Qty: 30 CAPSULE | Refills: 1 | Status: SHIPPED | OUTPATIENT
Start: 2023-05-16

## 2023-05-16 NOTE — PATIENT INSTRUCTIONS
Plaquenil 200 mg two per day. Eye exam once a year while on Plaquenil. Add Phenterimine 30 mg for energy and weight loss in the morning. Weight loss clinic at [address] [place] [address] or [facility] would be another option. Eat more fruit  and veggies. Lean cuts of meat. Norco for pain, from primary physician. On coumadin INR weekly. Return to office two months.

## 2023-06-06 ENCOUNTER — MED REC SCAN ONLY (OUTPATIENT)
Dept: FAMILY MEDICINE CLINIC | Facility: CLINIC | Age: 63
End: 2023-06-06

## 2023-06-08 RX ORDER — ALPRAZOLAM 1 MG/1
TABLET ORAL
Qty: 45 TABLET | Refills: 0 | Status: SHIPPED | OUTPATIENT
Start: 2023-06-08

## 2023-06-12 ENCOUNTER — TELEPHONE (OUTPATIENT)
Dept: RHEUMATOLOGY | Facility: CLINIC | Age: 63
End: 2023-06-12

## 2023-06-12 RX ORDER — PHENTERMINE HYDROCHLORIDE 37.5 MG/1
37.5 TABLET ORAL
Qty: 30 TABLET | Refills: 2 | Status: SHIPPED | OUTPATIENT
Start: 2023-06-12

## 2023-06-12 NOTE — TELEPHONE ENCOUNTER
Pt called office, wondering if Dr. Valerie Lema could increase the dosage on the Phentermine. Pt feels medication is working. Pt has increased energy and wt suppressant. Pt currently taking Phentermine 30mg daily.

## 2023-06-12 NOTE — TELEPHONE ENCOUNTER
Phentermine refill approved dosage increased to 37.5 mg once a day. Refill sent to [facility] in [place]. #30.

## 2023-06-20 ENCOUNTER — TELEPHONE (OUTPATIENT)
Dept: FAMILY MEDICINE CLINIC | Facility: CLINIC | Age: 63
End: 2023-06-20

## 2023-06-20 LAB — INR: 2.2 (ref 0.8–1.2)

## 2023-06-20 NOTE — TELEPHONE ENCOUNTER
Message left on pt's identified cell with Dr. Lukasz Guevara recommendations: To continue present dosage and recheck in 1 week. Instructed to call back on what dosage of Warfarin she is taking.

## 2023-06-21 ENCOUNTER — TELEPHONE (OUTPATIENT)
Dept: FAMILY MEDICINE CLINIC | Facility: CLINIC | Age: 63
End: 2023-06-21

## 2023-06-21 DIAGNOSIS — Z79.01 ENCOUNTER FOR CURRENT LONG-TERM USE OF ANTICOAGULANTS: ICD-10-CM

## 2023-06-21 RX ORDER — WARFARIN SODIUM 4 MG/1
TABLET ORAL
Qty: 180 TABLET | Refills: 0 | COMMUNITY
Start: 2023-06-21

## 2023-06-21 NOTE — TELEPHONE ENCOUNTER
Pt said we needed to know that she is on warfarin 8 mg daily. She didn't know if we knew that her rheumatologist put her on phentermine 37.5 mg 1 x day. She said it is helping her a lot.

## 2023-07-05 RX ORDER — PREDNISONE 10 MG/1
TABLET ORAL
Qty: 90 TABLET | Refills: 3 | Status: SHIPPED | OUTPATIENT
Start: 2023-07-05

## 2023-07-05 NOTE — TELEPHONE ENCOUNTER
Future Appointments   Date Time Provider Meredith Steffany   2/57/5911 64:86 PM Jacklyn Crabtree MD [facility]     LOV  5/16/2023    Last refill  5/17/2022  90 tabs, 3 refills

## 2023-07-06 ENCOUNTER — TELEPHONE (OUTPATIENT)
Dept: FAMILY MEDICINE CLINIC | Facility: CLINIC | Age: 63
End: 2023-07-06

## 2023-07-06 RX ORDER — HYDROCODONE BITARTRATE AND ACETAMINOPHEN 5; 325 MG/1; MG/1
1 TABLET ORAL EVERY 4 HOURS PRN
Qty: 30 TABLET | Refills: 0 | Status: SHIPPED | OUTPATIENT
Start: 2023-07-06 | End: 2023-08-05

## 2023-07-06 NOTE — TELEPHONE ENCOUNTER
Patient advised Dr Benton Swanson reviewed mammogram, repeat bilateral mammogram in November 2023. Continue Coumadin 8mg nightly, cut back on leafy greens and recheck INR in 1 week.  V.O. Dr Larisa Huff RN

## 2023-07-06 NOTE — TELEPHONE ENCOUNTER
PLEASE CALL PT REGARDING REFILL ON-    HYDROcodone-acetaminophen 5-325 MG Oral Tab     [facility][id number] - [place], [place] - [address] AT [address] [phone], [address] [facility]

## 2023-07-06 NOTE — TELEPHONE ENCOUNTER
Patient needs a refill on [place] 5/11/23 #30  She said that her INR was 1.8 and she is on 8mg of Coumadin. She said that Dr Pavel Savage put her on Phentermine 37.5mg daily 6 weeks ago. She wanted to make sure that she is not due for a mammogram because Dr Pavel Savage told her she needs one. Her last mammogram was November 2022.

## 2023-07-07 RX ORDER — CYCLOBENZAPRINE HCL 10 MG
10 TABLET ORAL 3 TIMES DAILY PRN
Qty: 90 TABLET | Refills: 0 | OUTPATIENT
Start: 2023-07-07

## 2023-07-07 RX ORDER — ALPRAZOLAM 1 MG/1
0.5 TABLET ORAL 3 TIMES DAILY PRN
Qty: 45 TABLET | Refills: 0 | OUTPATIENT
Start: 2023-07-07

## 2023-07-11 ENCOUNTER — TELEPHONE (OUTPATIENT)
Dept: FAMILY MEDICINE CLINIC | Facility: CLINIC | Age: 63
End: 2023-07-11

## 2023-07-11 RX ORDER — ALPRAZOLAM 1 MG/1
1 TABLET ORAL 3 TIMES DAILY PRN
Qty: 45 TABLET | Refills: 0 | Status: SHIPPED | OUTPATIENT
Start: 2023-07-11 | End: 2023-07-12 | Stop reason: DRUGHIGH

## 2023-07-11 NOTE — TELEPHONE ENCOUNTER
Pt calling to check on refill status on Alprazolam. States she no longer needs cyclobenzaprine. Reduced prednisone to 5 mg.      [facility] in [place]

## 2023-07-11 NOTE — TELEPHONE ENCOUNTER
Patient said that she has not needed the Cyclobenzaprine since Dr Pavel Savage put her on Phentermine. She said she has decreased the Prednisone to 5mg daily. Patient has Lorazepam on her list but is asking for generic Xanax to be sent to [place].

## 2023-07-12 ENCOUNTER — TELEPHONE (OUTPATIENT)
Dept: FAMILY MEDICINE CLINIC | Facility: CLINIC | Age: 63
End: 2023-07-12

## 2023-07-12 RX ORDER — ALPRAZOLAM 0.5 MG/1
1 TABLET ORAL NIGHTLY PRN
Qty: 60 TABLET | Refills: 0 | Status: SHIPPED | OUTPATIENT
Start: 2023-07-12 | End: 2023-08-11

## 2023-07-20 ENCOUNTER — OFFICE VISIT (OUTPATIENT)
Dept: RHEUMATOLOGY | Facility: CLINIC | Age: 63
End: 2023-07-20
Payer: MEDICARE

## 2023-07-20 VITALS
RESPIRATION RATE: 16 BRPM | TEMPERATURE: 97 F | BODY MASS INDEX: 46.12 KG/M2 | HEART RATE: 77 BPM | WEIGHT: 276.81 LBS | OXYGEN SATURATION: 96 % | DIASTOLIC BLOOD PRESSURE: 80 MMHG | HEIGHT: 65 IN | SYSTOLIC BLOOD PRESSURE: 128 MMHG

## 2023-07-20 DIAGNOSIS — Z79.01 CHRONIC ANTICOAGULATION: ICD-10-CM

## 2023-07-20 DIAGNOSIS — M79.7 FIBROMYALGIA: ICD-10-CM

## 2023-07-20 DIAGNOSIS — M17.0 PRIMARY OSTEOARTHRITIS OF BOTH KNEES: ICD-10-CM

## 2023-07-20 DIAGNOSIS — M32.13 OTHER SYSTEMIC LUPUS ERYTHEMATOSUS WITH LUNG INVOLVEMENT (HCC): Primary | ICD-10-CM

## 2023-07-20 PROCEDURE — 99214 OFFICE O/P EST MOD 30 MIN: CPT | Performed by: INTERNAL MEDICINE

## 2023-07-20 RX ORDER — HYDROXYCHLOROQUINE SULFATE 200 MG/1
TABLET, FILM COATED ORAL
Qty: 180 TABLET | Refills: 2 | Status: SHIPPED | OUTPATIENT
Start: 2023-07-20

## 2023-07-20 RX ORDER — PREDNISONE 2.5 MG/1
2.5 TABLET ORAL DAILY
Qty: 90 TABLET | Refills: 1 | Status: SHIPPED | OUTPATIENT
Start: 2023-07-20

## 2023-07-20 NOTE — PATIENT INSTRUCTIONS
For dental issues - consider seeing [facility] in [place] for a check-up. Or see Dr Daphne Sofia an oral surgeon in [facility]  who treats gum disease. Continue Phentermine. 30 mg per day. Plaquenil 200 mg twice a day. Prednisone 5 mg per day. Eye exam yearly. Cut Prednisone to 2.5 mg per day in one month if you continue to feel good. Return to office 6 months.

## 2023-07-24 RX ORDER — WARFARIN SODIUM 4 MG/1
TABLET ORAL
Qty: 180 TABLET | Refills: 0 | Status: SHIPPED | OUTPATIENT
Start: 2023-07-24

## 2023-07-24 NOTE — TELEPHONE ENCOUNTER
PT NEEDS REFILL ON-    warfarin 4 MG Oral Tab     [facility] #[id number] - [place], [place] - [address] [place] [address] AT [address] [phone], [address] [facility]

## 2023-07-24 NOTE — TELEPHONE ENCOUNTER
Patient has a home INR machine. Patient take Coumadin 8mg nightly.    Last OV w/Dr Chamorro Rounds 11/17/22  Last refill 6/21/23

## 2023-08-07 DIAGNOSIS — I10 ESSENTIAL HYPERTENSION: ICD-10-CM

## 2023-08-07 RX ORDER — ALPRAZOLAM 1 MG/1
0.5 TABLET ORAL 3 TIMES DAILY PRN
Qty: 45 TABLET | Refills: 1 | Status: SHIPPED | OUTPATIENT
Start: 2023-08-07

## 2023-08-07 RX ORDER — ATENOLOL 25 MG/1
25 TABLET ORAL DAILY
Qty: 90 TABLET | Refills: 0 | Status: SHIPPED | OUTPATIENT
Start: 2023-08-07

## 2023-08-07 NOTE — TELEPHONE ENCOUNTER
Patient dropped off forms that need to be filled out for a dental procedure she is having. Please call patient when forms have been filled out so she can pick them up. Patient also expressed that her medication was changed without discussing it with her. Due to this change she will not have enough medication until her next refill. Medication: ALPRAZolam 0.5 MG Oral Tab    Per Patient, she needs 1 mg and she needs a 45 day supply     Please call patient to discuss/advise.

## 2023-08-07 NOTE — TELEPHONE ENCOUNTER
ATENOLOL 25 MG Oral Tab     Hypertension Medications Protocol [id number]/2023 04:13 PM   Protocol Details Appointment in past 6 or next 3 months    CMP or BMP in past 12 months    Last serum creatinine< 2.0      Last office visit:  9/13/22    Future Appointments   Date Time Provider Meredith Jeter   5/53/8560 19:20 AM Nathan Black MD EMGRANDY EM[facility]   Last filled:  4/3/23  #90 with 0 refills   Last labs:  9/13/22  Crea:  1.11      Gregory Griggs and spoke with her and set up a physical.  Future Appointments   Date Time Provider Meredith Jeter   9/14/2023  3:00 PM Aubrey Mcknight MD EMGSW EM[facility] [place]   5/51/7241 69:51 AM MD SANTO Enriquez EMG [place] [facility]

## 2023-08-07 NOTE — TELEPHONE ENCOUNTER
Last refill 6/8/2023  Last visit  11/17/2022  Future Appointments   Date Time Provider Meredith Steffany   4/81/6710 21:98 AM Carlos Mueller MD EMGEUM[facility]BSN EM[facility]

## 2023-08-08 ENCOUNTER — TELEPHONE (OUTPATIENT)
Dept: FAMILY MEDICINE CLINIC | Facility: CLINIC | Age: 63
End: 2023-08-08

## 2023-08-08 LAB — INR: 2 (ref 0.8–1.2)

## 2023-08-08 NOTE — TELEPHONE ENCOUNTER
Per Aracelis Seaman INR result is 2.0 CPM of 8mg daily and recheck in 2 weeks.   Patient notifed and verbalized understanding

## 2023-08-10 ENCOUNTER — TELEPHONE (OUTPATIENT)
Dept: FAMILY MEDICINE CLINIC | Facility: CLINIC | Age: 63
End: 2023-08-10

## 2023-08-10 ENCOUNTER — MED REC SCAN ONLY (OUTPATIENT)
Dept: FAMILY MEDICINE CLINIC | Facility: CLINIC | Age: 63
End: 2023-08-10

## 2023-08-10 NOTE — TELEPHONE ENCOUNTER
Patient asked if Dr Herlinda Arguello got the paperwork to fill out for her on Monday when she brought her Granddaughter in or did it go to Dr Tab Herrmann?

## 2023-08-10 NOTE — TELEPHONE ENCOUNTER
I  Have not seen this paperwork    This mat be due to it being with Dr Kaitlin Waldrop can look at this Friday Phone call made 12:35 PM  8/10/2023   No answer.   Left message to return call if the papers are located  But I do not have

## 2023-08-29 RX ORDER — BENAZEPRIL HYDROCHLORIDE 20 MG/1
TABLET ORAL
Qty: 90 TABLET | Refills: 1 | Status: SHIPPED | OUTPATIENT
Start: 2023-08-29

## 2023-09-05 ENCOUNTER — MED REC SCAN ONLY (OUTPATIENT)
Dept: FAMILY MEDICINE CLINIC | Facility: CLINIC | Age: 63
End: 2023-09-05

## 2023-09-06 RX ORDER — HYDROCODONE BITARTRATE AND ACETAMINOPHEN 5; 325 MG/1; MG/1
1 TABLET ORAL EVERY 4 HOURS PRN
Qty: 30 TABLET | Refills: 0 | Status: SHIPPED | OUTPATIENT
Start: 2023-09-06 | End: 2023-10-06

## 2023-09-13 RX ORDER — PHENTERMINE HYDROCHLORIDE 37.5 MG/1
37.5 TABLET ORAL
Qty: 30 TABLET | Refills: 1 | Status: SHIPPED | OUTPATIENT
Start: 2023-09-13

## 2023-09-14 ENCOUNTER — OFFICE VISIT (OUTPATIENT)
Dept: FAMILY MEDICINE CLINIC | Facility: CLINIC | Age: 63
End: 2023-09-14
Payer: MEDICARE

## 2023-09-14 VITALS
TEMPERATURE: 98 F | OXYGEN SATURATION: 96 % | HEART RATE: 73 BPM | BODY MASS INDEX: 44.98 KG/M2 | RESPIRATION RATE: 18 BRPM | SYSTOLIC BLOOD PRESSURE: 122 MMHG | DIASTOLIC BLOOD PRESSURE: 76 MMHG | HEIGHT: 65 IN | WEIGHT: 270 LBS

## 2023-09-14 DIAGNOSIS — F13.20 SEDATIVE, HYPNOTIC OR ANXIOLYTIC DEPENDENCE, UNCOMPLICATED (HCC): ICD-10-CM

## 2023-09-14 DIAGNOSIS — E66.01 CLASS 3 SEVERE OBESITY DUE TO EXCESS CALORIES WITH SERIOUS COMORBIDITY AND BODY MASS INDEX (BMI) OF 40.0 TO 44.9 IN ADULT (HCC): ICD-10-CM

## 2023-09-14 DIAGNOSIS — M79.7 FIBROMYALGIA: ICD-10-CM

## 2023-09-14 DIAGNOSIS — M17.0 PRIMARY OSTEOARTHRITIS OF BOTH KNEES: ICD-10-CM

## 2023-09-14 DIAGNOSIS — M32.19 OTHER SYSTEMIC LUPUS ERYTHEMATOSUS WITH OTHER ORGAN INVOLVEMENT (HCC): ICD-10-CM

## 2023-09-14 DIAGNOSIS — J43.2 CENTRILOBULAR EMPHYSEMA (HCC): ICD-10-CM

## 2023-09-14 DIAGNOSIS — I10 PRIMARY HYPERTENSION: Primary | ICD-10-CM

## 2023-09-14 DIAGNOSIS — E27.40 ADRENAL INSUFFICIENCY (HCC): ICD-10-CM

## 2023-09-14 DIAGNOSIS — E55.9 VITAMIN D DEFICIENCY: ICD-10-CM

## 2023-09-14 DIAGNOSIS — N18.32 STAGE 3B CHRONIC KIDNEY DISEASE (HCC): ICD-10-CM

## 2023-09-14 DIAGNOSIS — Z00.00 ENCOUNTER FOR ANNUAL HEALTH EXAMINATION: ICD-10-CM

## 2023-09-14 PROBLEM — F43.9 STRESS: Status: RESOLVED | Noted: 2021-11-16 | Resolved: 2023-09-14

## 2023-09-14 LAB
ALBUMIN SERPL-MCNC: 3.2 G/DL (ref 3.4–5)
ALBUMIN/GLOB SERPL: 0.8 {RATIO} (ref 1–2)
ALP LIVER SERPL-CCNC: 79 U/L
ALT SERPL-CCNC: 23 U/L
ANION GAP SERPL CALC-SCNC: 5 MMOL/L (ref 0–18)
AST SERPL-CCNC: 15 U/L (ref 15–37)
BASOPHILS # BLD AUTO: 0.05 X10(3) UL (ref 0–0.2)
BASOPHILS NFR BLD AUTO: 0.5 %
BILIRUB SERPL-MCNC: 0.4 MG/DL (ref 0.1–2)
BUN BLD-MCNC: 15 MG/DL (ref 7–18)
CALCIUM BLD-MCNC: 9 MG/DL (ref 8.5–10.1)
CHLORIDE SERPL-SCNC: 107 MMOL/L (ref 98–112)
CHOLEST SERPL-MCNC: 185 MG/DL (ref ?–200)
CO2 SERPL-SCNC: 26 MMOL/L (ref 21–32)
CREAT BLD-MCNC: 0.87 MG/DL
CRP SERPL-MCNC: 1.05 MG/DL (ref ?–0.3)
EGFRCR SERPLBLD CKD-EPI 2021: 75 ML/MIN/1.73M2 (ref 60–?)
EOSINOPHIL # BLD AUTO: 0.08 X10(3) UL (ref 0–0.7)
EOSINOPHIL NFR BLD AUTO: 0.9 %
ERYTHROCYTE [DISTWIDTH] IN BLOOD BY AUTOMATED COUNT: 14 %
GLOBULIN PLAS-MCNC: 3.8 G/DL (ref 2.8–4.4)
GLUCOSE BLD-MCNC: 106 MG/DL (ref 70–99)
HCT VFR BLD AUTO: 44.2 %
HDLC SERPL-MCNC: 67 MG/DL (ref 40–59)
HGB BLD-MCNC: 14.9 G/DL
IMM GRANULOCYTES # BLD AUTO: 0.06 X10(3) UL (ref 0–1)
IMM GRANULOCYTES NFR BLD: 0.7 %
LDLC SERPL CALC-MCNC: 91 MG/DL (ref ?–100)
LYMPHOCYTES # BLD AUTO: 1.07 X10(3) UL (ref 1–4)
LYMPHOCYTES NFR BLD AUTO: 11.7 %
MCH RBC QN AUTO: 30.9 PG (ref 26–34)
MCHC RBC AUTO-ENTMCNC: 33.7 G/DL (ref 31–37)
MCV RBC AUTO: 91.7 FL
MONOCYTES # BLD AUTO: 0.64 X10(3) UL (ref 0.1–1)
MONOCYTES NFR BLD AUTO: 7 %
NEUTROPHILS # BLD AUTO: 7.27 X10 (3) UL (ref 1.5–7.7)
NEUTROPHILS # BLD AUTO: 7.27 X10(3) UL (ref 1.5–7.7)
NEUTROPHILS NFR BLD AUTO: 79.2 %
NONHDLC SERPL-MCNC: 118 MG/DL (ref ?–130)
OSMOLALITY SERPL CALC.SUM OF ELEC: 287 MOSM/KG (ref 275–295)
PLATELET # BLD AUTO: 235 10(3)UL (ref 150–450)
POTASSIUM SERPL-SCNC: 3.9 MMOL/L (ref 3.5–5.1)
PROT SERPL-MCNC: 7 G/DL (ref 6.4–8.2)
RBC # BLD AUTO: 4.82 X10(6)UL
SODIUM SERPL-SCNC: 138 MMOL/L (ref 136–145)
TRIGL SERPL-MCNC: 160 MG/DL (ref 30–149)
VIT D+METAB SERPL-MCNC: 53.5 NG/ML (ref 30–100)
VLDLC SERPL CALC-MCNC: 26 MG/DL (ref 0–30)
WBC # BLD AUTO: 9.2 X10(3) UL (ref 4–11)

## 2023-09-14 PROCEDURE — 80053 COMPREHEN METABOLIC PANEL: CPT | Performed by: INTERNAL MEDICINE

## 2023-09-14 PROCEDURE — G0439 PPPS, SUBSEQ VISIT: HCPCS | Performed by: INTERNAL MEDICINE

## 2023-09-14 PROCEDURE — 90677 PCV20 VACCINE IM: CPT | Performed by: INTERNAL MEDICINE

## 2023-09-14 PROCEDURE — 85025 COMPLETE CBC W/AUTO DIFF WBC: CPT | Performed by: INTERNAL MEDICINE

## 2023-09-14 PROCEDURE — 86140 C-REACTIVE PROTEIN: CPT | Performed by: INTERNAL MEDICINE

## 2023-09-14 PROCEDURE — 80061 LIPID PANEL: CPT | Performed by: INTERNAL MEDICINE

## 2023-09-14 PROCEDURE — G0009 ADMIN PNEUMOCOCCAL VACCINE: HCPCS | Performed by: INTERNAL MEDICINE

## 2023-09-14 PROCEDURE — 82306 VITAMIN D 25 HYDROXY: CPT | Performed by: INTERNAL MEDICINE

## 2023-09-15 DIAGNOSIS — R91.1 PULMONARY NODULE: Primary | ICD-10-CM

## 2023-09-15 DIAGNOSIS — Z12.31 ENCOUNTER FOR SCREENING MAMMOGRAM FOR BREAST CANCER: ICD-10-CM

## 2023-09-19 ENCOUNTER — TELEPHONE (OUTPATIENT)
Dept: FAMILY MEDICINE CLINIC | Facility: CLINIC | Age: 63
End: 2023-09-19

## 2023-09-19 NOTE — TELEPHONE ENCOUNTER
Rec'd home INR result from [facility]. Result: 1.9    Need to confirm dose. PC to pt. Spoke with pt. She states she is taking 8 mg qhs. Pt states she has been eating too much broccoli and cabbage and thinks that's why her INR is at this level. Please advise.

## 2023-09-20 ENCOUNTER — TELEPHONE (OUTPATIENT)
Dept: FAMILY MEDICINE CLINIC | Facility: CLINIC | Age: 63
End: 2023-09-20

## 2023-09-20 DIAGNOSIS — R91.1 PULMONARY NODULE: Primary | ICD-10-CM

## 2023-09-20 NOTE — TELEPHONE ENCOUNTER
Spoke with patient who states she would like to go to [place] to have her CT done. Advised this nurse will cancel the current order and put in a new external order and fax to [facility]. I have also sent a message to the yme team. Patient also states insurance will not cover until after 10/25/23, so she is ok with waiting for authorization determination.

## 2023-09-20 NOTE — TELEPHONE ENCOUNTER
Pt would like CT SCAN orders to be sent to [facility] instead of [facility].

## 2023-10-04 ENCOUNTER — TELEPHONE (OUTPATIENT)
Dept: FAMILY MEDICINE CLINIC | Facility: CLINIC | Age: 63
End: 2023-10-04

## 2023-10-04 NOTE — TELEPHONE ENCOUNTER
ROHINI for patient that we received INR results of 2.0, continue same dose of Coumadin and recheck INR in 2 weeks.  NANCY Montoya RN

## 2023-10-09 RX ORDER — ALPRAZOLAM 1 MG/1
0.5 TABLET ORAL 3 TIMES DAILY PRN
Qty: 45 TABLET | Refills: 0 | Status: SHIPPED | OUTPATIENT
Start: 2023-10-09

## 2023-10-10 ENCOUNTER — TELEPHONE (OUTPATIENT)
Dept: FAMILY MEDICINE CLINIC | Facility: CLINIC | Age: 63
End: 2023-10-10

## 2023-10-10 DIAGNOSIS — Z51.81 ANTICOAGULATION MANAGEMENT ENCOUNTER: Primary | ICD-10-CM

## 2023-10-10 DIAGNOSIS — Z79.01 ANTICOAGULATION MANAGEMENT ENCOUNTER: Primary | ICD-10-CM

## 2023-10-10 LAB — INR: 1.8 (ref 0.8–1.2)

## 2023-10-10 PROCEDURE — 85610 PROTHROMBIN TIME: CPT | Performed by: INTERNAL MEDICINE

## 2023-10-10 RX ORDER — WARFARIN SODIUM 4 MG/1
TABLET ORAL
COMMUNITY
Start: 2023-10-10

## 2023-10-10 NOTE — TELEPHONE ENCOUNTER
Fax received with INR results of 1.8. Patient is currently taking 8mg of Coumadin. Advised to increase Coumadin to 8.5mg and recheck INR in 2 weeks.  NICOLE.O. Dr Daniel Kohli RN

## 2023-10-15 DIAGNOSIS — Z51.81 ANTICOAGULATION MANAGEMENT ENCOUNTER: ICD-10-CM

## 2023-10-15 DIAGNOSIS — Z79.01 ANTICOAGULATION MANAGEMENT ENCOUNTER: ICD-10-CM

## 2023-10-16 NOTE — TELEPHONE ENCOUNTER
Last refill: 07/24/23  Qty:  180  W/ 0 refills  Last ov: 09/14/23    Requested Prescriptions     Pending Prescriptions Disp Refills    warfarin 4 MG Oral Tab [Pharmacy Med Name: WARFARIN SOD 4MG TABLETS (BLUE)] 180 tablet 0     Sig: TAKE 2 TABLETS BY MOUTH EVERY DAY AS DIRECTED     Future Appointments   Date Time Provider Meredith Jeter   8/32/7726 15:76 AM Gerber Lopez MD EMGRHEUM[facility]BSN EM[facility] Mu[facility]oz

## 2023-10-17 RX ORDER — WARFARIN SODIUM 4 MG/1
TABLET ORAL
Qty: 180 TABLET | Refills: 0 | Status: SHIPPED | OUTPATIENT
Start: 2023-10-17

## 2023-10-18 ENCOUNTER — TELEPHONE (OUTPATIENT)
Dept: FAMILY MEDICINE CLINIC | Facility: CLINIC | Age: 63
End: 2023-10-18

## 2023-10-18 DIAGNOSIS — Z79.01 ANTICOAGULATION MANAGEMENT ENCOUNTER: Primary | ICD-10-CM

## 2023-10-18 DIAGNOSIS — Z51.81 ANTICOAGULATION MANAGEMENT ENCOUNTER: Primary | ICD-10-CM

## 2023-10-18 LAB — INR: 1.8 (ref 0.8–1.2)

## 2023-10-18 PROCEDURE — 85610 PROTHROMBIN TIME: CPT | Performed by: INTERNAL MEDICINE

## 2023-10-18 NOTE — TELEPHONE ENCOUNTER
Received INR results of 1.8. Patient is currently taking 8.5mg of Coumadin nightly. Advised to continue the same dose of coumadin and recheck INR in 1 week.  NICOLE.O. Dr Angella Camilo RN

## 2023-10-23 ENCOUNTER — TELEPHONE (OUTPATIENT)
Dept: FAMILY MEDICINE CLINIC | Facility: CLINIC | Age: 63
End: 2023-10-23

## 2023-10-23 NOTE — TELEPHONE ENCOUNTER
NEED NEW MAMMO ORDER, PT HAS RIGHT BREAST DISCHARGE, PT HAS APPT 11/6, FAX TO [facility] SCHEDULING # [phone]

## 2023-10-24 ENCOUNTER — TELEPHONE (OUTPATIENT)
Dept: FAMILY MEDICINE CLINIC | Facility: CLINIC | Age: 63
End: 2023-10-24

## 2023-10-24 LAB — INR: 1.8 (ref 0.8–1.2)

## 2023-10-24 NOTE — TELEPHONE ENCOUNTER
----- Message from Ronnie Marinelli MD sent at 10/24/2023 12:14 PM CDT -----  Increase to 9 mg a day

## 2023-10-24 NOTE — TELEPHONE ENCOUNTER
No this is chronic , mammo screening not diagnostic, increase couadin  to 9 mg dailt, she checks home reading every week.

## 2023-10-24 NOTE — TELEPHONE ENCOUNTER
Spoke with patient - has had intermittent right nipple discharge since age 31. States happens 1-2 times a month - very small amount. With discharge - imagining is asking if diagnostic should be done?     INR 1.8  Pt takes 8.5mg nightly  Last INR was 1.8 on 10/18/23

## 2023-10-24 NOTE — TELEPHONE ENCOUNTER
Called [facility] mamm[facility] dept - message left that mamm should be screening only. This office # left to call back with any questions. Also called pt at home #  - [phone]. Pt requested that message be left re: Warfarin. Instructed to take 9mg nightly and recheck INR in 1 week. To call office back with any questions.

## 2023-10-30 ENCOUNTER — TELEPHONE (OUTPATIENT)
Dept: FAMILY MEDICINE CLINIC | Facility: CLINIC | Age: 63
End: 2023-10-30

## 2023-10-30 RX ORDER — HYDROCODONE BITARTRATE AND ACETAMINOPHEN 5; 325 MG/1; MG/1
1 TABLET ORAL EVERY 4 HOURS PRN
Qty: 30 TABLET | Refills: 0 | Status: SHIPPED | OUTPATIENT
Start: 2023-10-30 | End: 2023-11-29

## 2023-11-04 NOTE — TELEPHONE ENCOUNTER
Cyclobenzaprine   Last refill: 11/30/21  Qty: 90  W/ 0 refills  Last ov: 11/15/21    Alprazolam  Last refill: 11/30/21  Qty: 45  W/ 0 refills  Last ov: 11/15/21    Requested Prescriptions     Pending Prescriptions Disp Refills   • CYCLOBENZAPRINE 10 MG Ora
none

## 2023-11-10 DIAGNOSIS — I10 ESSENTIAL HYPERTENSION: ICD-10-CM

## 2023-11-10 RX ORDER — PHENTERMINE HYDROCHLORIDE 37.5 MG/1
37.5 TABLET ORAL
Qty: 30 TABLET | Refills: 0 | Status: SHIPPED | OUTPATIENT
Start: 2023-11-10

## 2023-11-10 NOTE — TELEPHONE ENCOUNTER
Future Appointments   Date Time Provider Meredith Steffany   3/78/6538 80:22 AM Mariana Kohli MD EM[facility]UM[facility]N [facility]     Last office visit: 7/20/2023    Last fill: 9/31/2023 30 tab, 1 refills

## 2023-11-11 RX ORDER — ALPRAZOLAM 1 MG/1
0.5 TABLET ORAL 3 TIMES DAILY PRN
Qty: 45 TABLET | Refills: 0 | Status: SHIPPED | OUTPATIENT
Start: 2023-11-11

## 2023-11-11 RX ORDER — ATENOLOL 25 MG/1
25 TABLET ORAL DAILY
Qty: 90 TABLET | Refills: 0 | Status: SHIPPED | OUTPATIENT
Start: 2023-11-11

## 2023-11-11 NOTE — TELEPHONE ENCOUNTER
Atenolol  Last refill: 08/07/23  Qty: 90  W 0 refills  Last ov: 09/14/23  BP Readings from Last 3 Encounters:   09/14/23 122/76   07/20/23 128/80   05/16/23 130/89       Alprazolam   Last refill: 10/09/23  Qty: 45 tabs  W/ 0 refills  Last ov 09/14/23    Requested Prescriptions     Pending Prescriptions Disp Refills    ATENOLOL 25 MG Oral Tab [Pharmacy Med Name: ATENOLOL 25MG TABLETS] 90 tablet 0     Sig: TAKE 1 TABLET(25 MG) BY MOUTH DAILY    ALPRAZOLAM 1 MG Oral Tab [Pharmacy Med Name: ALPRAZOLAM 1MG TABLETS] 45 tablet 0     Sig: TAKE 1/2 TABLET(0.5 MG) BY MOUTH THREE TIMES DAILY AS NEEDED FOR ANXIETY OR SLEEP     Future Appointments   Date Time Provider Meredith Jeter   2/77/8317 99:56 AM Franne Angelucci, MD EM[facility] EM[facility] N[facility] Cespedes

## 2023-11-13 ENCOUNTER — TELEPHONE (OUTPATIENT)
Dept: FAMILY MEDICINE CLINIC | Facility: CLINIC | Age: 63
End: 2023-11-13

## 2023-11-13 DIAGNOSIS — J43.2 CENTRILOBULAR EMPHYSEMA (HCC): Primary | ICD-10-CM

## 2023-11-13 PROCEDURE — 85610 PROTHROMBIN TIME: CPT | Performed by: INTERNAL MEDICINE

## 2023-11-13 NOTE — TELEPHONE ENCOUNTER
Patient advised. She said that she had been seeing Dr Michel Castro in [place]. She said that she is looking for a new pulmonologist. Does Dr Javier Alvarez have any recommendations?

## 2023-11-13 NOTE — TELEPHONE ENCOUNTER
Mammogram was normal; if you don't, you do need to see pulmonology for abnormal CT chest -- bronchiectasis.

## 2023-11-14 LAB — INR: 2.7 (ref 0.8–1.2)

## 2023-11-14 NOTE — TELEPHONE ENCOUNTER
For now I forward CT to [facility] for now, I think she needs serial sputums for bronchiectasis and maybe vibration therapy if she had a productive cough every morning.

## 2023-11-20 RX ORDER — IPRATROPIUM BROMIDE AND ALBUTEROL SULFATE 2.5; .5 MG/3ML; MG/3ML
3 SOLUTION RESPIRATORY (INHALATION) EVERY 6 HOURS PRN
Qty: 360 ML | Refills: 11 | Status: SHIPPED | OUTPATIENT
Start: 2023-11-20

## 2023-11-20 NOTE — TELEPHONE ENCOUNTER
IPRATROPIUM DROMIDE 0.5, ALBUTEROL SULFATE 3MG.   SHE ALSO WANTS TO TALK TO YOU ABOUT THE PNEUMONIA SHOT SHE GOT

## 2023-11-20 NOTE — TELEPHONE ENCOUNTER
Patient said she has been sick since around the time she got the pneumonia shot. She said she did the neb treatments twice and increased her Prednisone to 20mg but took 35mg today she had been taking 10mg. She has been taking Mucinex DM for 4 days. She said she has been wheezing and SOB for 2 weeks. She can come in tomorrow if needed. She is seeing the pulmonologist om 12/12/23  She asked if the CT scan she had 11/6/23 showed congestion.

## 2023-11-21 ENCOUNTER — OFFICE VISIT (OUTPATIENT)
Dept: FAMILY MEDICINE CLINIC | Facility: CLINIC | Age: 63
End: 2023-11-21
Payer: MEDICARE

## 2023-11-21 VITALS
SYSTOLIC BLOOD PRESSURE: 110 MMHG | WEIGHT: 270.38 LBS | DIASTOLIC BLOOD PRESSURE: 70 MMHG | TEMPERATURE: 97 F | OXYGEN SATURATION: 96 % | HEART RATE: 78 BPM | BODY MASS INDEX: 45 KG/M2

## 2023-11-21 DIAGNOSIS — R05.3 CHRONIC COUGH: Primary | ICD-10-CM

## 2023-11-21 DIAGNOSIS — J01.00 SUBACUTE MAXILLARY SINUSITIS: ICD-10-CM

## 2023-11-21 DIAGNOSIS — L82.1 SEBORRHEIC KERATOSIS: ICD-10-CM

## 2023-11-21 PROBLEM — I10 HTN (HYPERTENSION): Status: ACTIVE | Noted: 2020-09-18

## 2023-11-21 LAB — INR: 3.1 (ref 0.8–1.2)

## 2023-11-21 PROCEDURE — 85610 PROTHROMBIN TIME: CPT

## 2023-11-21 PROCEDURE — 99213 OFFICE O/P EST LOW 20 MIN: CPT

## 2023-11-21 RX ORDER — CEFPROZIL 500 MG/1
500 TABLET, FILM COATED ORAL 2 TIMES DAILY
Qty: 20 TABLET | Refills: 0 | Status: SHIPPED | OUTPATIENT
Start: 2023-11-21 | End: 2023-12-01

## 2023-11-21 RX ORDER — IPRATROPIUM BROMIDE AND ALBUTEROL SULFATE 2.5; .5 MG/3ML; MG/3ML
3 SOLUTION RESPIRATORY (INHALATION) ONCE
Status: SHIPPED | OUTPATIENT
Start: 2023-11-21

## 2023-11-22 ENCOUNTER — TELEPHONE (OUTPATIENT)
Dept: FAMILY MEDICINE CLINIC | Facility: CLINIC | Age: 63
End: 2023-11-22

## 2023-11-29 ENCOUNTER — TELEPHONE (OUTPATIENT)
Dept: FAMILY MEDICINE CLINIC | Facility: CLINIC | Age: 63
End: 2023-11-29

## 2023-11-29 NOTE — TELEPHONE ENCOUNTER
Leena Sales    I spoke with Dr. Corrinne Homans regarding the black spot to your ear and reviewed the phot with her and she recommends you see dermatology. [facility] [place].

## 2023-11-30 NOTE — TELEPHONE ENCOUNTER
The antibiotic will continue to work a few days after discontinued. It he bumps continue I would recommend having them evaluated.

## 2023-11-30 NOTE — TELEPHONE ENCOUNTER
Spoke with patient - informed that recommendation is to see dermatologist- Phone number given for [facility]) Dermatology - [phone]    Pt asking for an extension of her antibiotic. States had seen Dr. Jacqueline Morales for \"bumps in her mouth\" - since being on the antibiotic - feels the bumps are improving. Pt put on Cefprozil by Kannan Bush on 11/21/23 - finishes tomorrow.     Please advise

## 2023-11-30 NOTE — TELEPHONE ENCOUNTER
Pt returned call - given Rosalva's recommendations: The antibiotic will continue to work a few days after discontinued. It he bumps continue I would recommend having them evaluated.    Pt verbalized understanding

## 2023-12-05 ENCOUNTER — TELEPHONE (OUTPATIENT)
Dept: FAMILY MEDICINE CLINIC | Facility: CLINIC | Age: 63
End: 2023-12-05

## 2023-12-05 LAB — INR: 2.3 (ref 0.8–1.2)

## 2023-12-05 NOTE — TELEPHONE ENCOUNTER
Received INR from [facility]    Results - 2.3  Takes 8mg nightly

## 2023-12-11 RX ORDER — ALPRAZOLAM 1 MG/1
0.5 TABLET ORAL 3 TIMES DAILY PRN
Qty: 45 TABLET | Refills: 0 | Status: SHIPPED | OUTPATIENT
Start: 2023-12-11

## 2023-12-11 RX ORDER — ALPRAZOLAM 1 MG/1
0.5 TABLET ORAL 3 TIMES DAILY PRN
Qty: 45 TABLET | Refills: 0 | OUTPATIENT
Start: 2023-12-11

## 2023-12-11 NOTE — TELEPHONE ENCOUNTER
Duplicate request. Request for refill routed to Dr. Jackeline Brooke in another encounter dated 12/9/23.

## 2023-12-19 LAB
INR: 1.8 (ref 0.8–1.2)
INR: 1.8 (ref 0.8–1.2)

## 2023-12-20 ENCOUNTER — ANTI-COAG VISIT (OUTPATIENT)
Dept: FAMILY MEDICINE CLINIC | Facility: CLINIC | Age: 63
End: 2023-12-20

## 2023-12-20 NOTE — PROGRESS NOTES
Received fax from [facility] [place]  INR on 12/19/23 - 1.8  To Dr. Gloria Mcdonald

## 2023-12-22 ENCOUNTER — TELEPHONE (OUTPATIENT)
Dept: FAMILY MEDICINE CLINIC | Facility: CLINIC | Age: 63
End: 2023-12-22

## 2023-12-22 NOTE — TELEPHONE ENCOUNTER
Request for refill on Hydrocodone-acetaminophen 5-325        LOV  9-14-23    LAST LAB    9-2023    LAST RX  10-30-23  #30    Next OV    Future Appointments   Date Time Provider Department Center   5/78/7999 13:22 AM Michelle Christiansen MD EM[facility]

## 2023-12-23 RX ORDER — HYDROCODONE BITARTRATE AND ACETAMINOPHEN 5; 325 MG/1; MG/1
1 TABLET ORAL EVERY 4 HOURS PRN
Qty: 30 TABLET | Refills: 0 | Status: SHIPPED | OUTPATIENT
Start: 2023-12-23 | End: 2024-01-22

## 2023-12-29 DIAGNOSIS — Z79.01 ANTICOAGULATION MANAGEMENT ENCOUNTER: ICD-10-CM

## 2023-12-29 DIAGNOSIS — Z51.81 ANTICOAGULATION MANAGEMENT ENCOUNTER: ICD-10-CM

## 2024-01-02 RX ORDER — WARFARIN SODIUM 4 MG/1
TABLET ORAL
Qty: 180 TABLET | Refills: 0 | Status: SHIPPED | OUTPATIENT
Start: 2024-01-02

## 2024-01-02 NOTE — TELEPHONE ENCOUNTER
Last refill: 10/17/23  qtY: 180  W/ 0 refills  Last ov: 09/14/23    Requested Prescriptions     Pending Prescriptions Disp Refills    WARFARIN 4 MG Oral Tab [Pharmacy Med Name: WARFARIN SOD 4MG TABLETS (BLUE)] 180 tablet 0     Sig: TAKE 2 TABLETS BY MOUTH EVERY DAY AS DIRECTED     Future Appointments   Date Time Provider Department Center   1/18/2024 10:40 AM Kris Salinas MD [facility]

## 2024-01-10 ENCOUNTER — TELEPHONE (OUTPATIENT)
Dept: FAMILY MEDICINE CLINIC | Facility: CLINIC | Age: 64
End: 2024-01-10

## 2024-01-10 ENCOUNTER — ANTI-COAG VISIT (OUTPATIENT)
Dept: FAMILY MEDICINE CLINIC | Facility: CLINIC | Age: 64
End: 2024-01-10

## 2024-01-10 LAB — INR: 2.6 (ref 0.8–1.2)

## 2024-01-10 NOTE — PROGRESS NOTES
MdINR: 2.6  Current Dose: Warfarin 8.5 mg PO Daily    Spoke with patient and she is currently on Warfarin 8.5 mg PO daily. She is also on prednisone due to a cold. She recently increased from 5 mg to 10 mg, which might effect her INR at a later date. No issues noted at time of call.

## 2024-01-11 ENCOUNTER — OFFICE VISIT (OUTPATIENT)
Dept: FAMILY MEDICINE CLINIC | Facility: CLINIC | Age: 64
End: 2024-01-11
Payer: MEDICARE

## 2024-01-11 VITALS
HEART RATE: 73 BPM | WEIGHT: 272 LBS | OXYGEN SATURATION: 97 % | SYSTOLIC BLOOD PRESSURE: 125 MMHG | BODY MASS INDEX: 45.32 KG/M2 | HEIGHT: 65 IN | TEMPERATURE: 98 F | DIASTOLIC BLOOD PRESSURE: 80 MMHG

## 2024-01-11 DIAGNOSIS — R05.3 CHRONIC COUGH: ICD-10-CM

## 2024-01-11 DIAGNOSIS — R09.81 NASAL CONGESTION: Primary | ICD-10-CM

## 2024-01-11 PROBLEM — R91.1 PULMONARY NODULE 1 CM OR GREATER IN DIAMETER: Status: ACTIVE | Noted: 2023-12-12

## 2024-01-11 PROCEDURE — 99213 OFFICE O/P EST LOW 20 MIN: CPT

## 2024-01-11 PROCEDURE — 87637 SARSCOV2&INF A&B&RSV AMP PRB: CPT

## 2024-01-11 NOTE — PROGRESS NOTES
Tila Cassidy is a 63 year old female.  HPI:     Patient in office for nasal congestion with green/yellow discharge, sinus pressure and cough that started today but reports symptoms never really went away from when she was seen in November.  She thinks she may have a sinus infection and she does not want it to go to her lungs since she is susceptible to this.  Denies fever at this time.  She has tried Mucinex DM over the counter with minimal relief.  She said back in November she titrated her prednisone to 35 and then down to 10 daily to maintain and is currently on 10 mg of prednisone daily.  She would like 14 days of cefdinir to be sent to the pharmacy instead of 10 since she feels a few more days of the antibiotic would be more effective. She is a current smoker and smokes 3 packs of cigarettes a week.      Current Outpatient Medications   Medication Sig Dispense Refill    ALPRAZolam 1 MG Oral Tab Take 0.5 tablets (0.5 mg total) by mouth 3 (three) times daily as needed for Anxiety or Sleep. 45 tablet 0    warfarin 4 MG Oral Tab TAKE 2 TABLETS BY MOUTH EVERY DAY AS DIRECTED 180 tablet 0    HYDROcodone-acetaminophen (NORCO) 5-325 MG Oral Tab Take 1 tablet by mouth every 4 (four) hours as needed for Pain. 30 tablet 0    ipratropium-albuterol 0.5-2.5 (3) MG/3ML Inhalation Solution Take 3 mL by nebulization every 6 (six) hours as needed. 360 mL 11    atenolol 25 MG Oral Tab Take 1 tablet (25 mg total) by mouth daily. 90 tablet 0    PHENTERMINE HCL 37.5 MG Oral Tab TAKE 1 TABLET(37.5 MG) BY MOUTH EVERY MORNING BEFORE BREAKFAST 30 tablet 0    BENAZEPRIL HCL 20 MG Oral Tab TAKE 1 TABLET(20 MG) BY MOUTH DAILY 90 tablet 1    predniSONE 2.5 MG Oral Tab Take 1 tablet (2.5 mg total) by mouth daily. 90 tablet 1    hydroxychloroquine 200 MG Oral Tab TAKE 2 TABLETS BY MOUTH EVERY  tablet 2    PREDNISONE 10 MG Oral Tab TAKE 1 TABLET(10 MG) BY MOUTH DAILY 90 tablet 3    albuterol 108 (90 Base) MCG/ACT Inhalation Aero Soln  Inhale 2 puffs into the lungs every 6 (six) hours as needed for Wheezing or Shortness of Breath. 1 each 11    mupirocin 2 % External Ointment Apply topically 3 (three) times daily.      Mometasone Furoate 0.1 % External Cream Apply 1 Application topically 2 (two) times daily as needed. 45 g 0    folic acid 1 MG Oral Tab Take 2 tablets (2 mg total) by mouth daily. (Patient not taking: Reported on 11/21/2023) 60 tablet 11    cholecalciferol 50 MCG (2000 UT) Oral Tab Take 1 tablet (2,000 Units total) by mouth daily.        Past Medical History:   Diagnosis Date    Adenomyomatosis of gallbladder 11/1/2018    Adrenal insufficiency (HCC)     due to steroids    Anxiety state     Chronic low back pain     COPD (chronic obstructive pulmonary disease) ([facility])     PFTs 8/2016, [facility]    Fibromyalgia 1/15/2020    Hearing impairment     bilateral hearing loss    History of cervical dysplasia 1/19/2017    History of colon polyps 4/9/2018    History of pulmonary embolus (PE)     2014, right sided, at time was driving 8 hrs for work everyday     HTN (hypertension)     JOHN (obstructive sleep apnea)     Unable to tolerate CPAP    Positive cardiolipin antibodies     Borderline positive, IgG    Primary osteoarthritis of both hips 11/13/2014    Primary osteoarthritis of both knees 1/15/2020    SLE (systemic lupus erythematosus) ([facility])     sister and brother also have    Spondylosis of lumbar region without myelopathy or radiculopathy 1/15/2020    Tubular adenoma of colon 1/19/2017    Visual impairment     glasses for reading    Warfarin anticoagulation 7/19/2017      Social History:  Social History     Socioeconomic History    Marital status:    Tobacco Use    Smoking status: Every Day     Packs/day: 0.50     Years: 40.00     Additional pack years: 0.00     Total pack years: 20.00     Types: Cigarettes    Smokeless tobacco: Never    Tobacco comments:     cutting back certainly now   Substance and Sexual Activity    Alcohol use:  Yes     Alcohol/week: 0.0 standard drinks of alcohol     Comment: social    Drug use: No          REVIEW OF SYSTEMS:     Review of Systems   Constitutional:  Negative for activity change, appetite change and fatigue.   HENT:  Positive for congestion, rhinorrhea, sinus pressure and sinus pain. Negative for hearing loss.    Eyes:  Negative for discharge and redness.   Respiratory:  Positive for cough. Negative for shortness of breath and wheezing.    Cardiovascular:  Negative for chest pain.   Gastrointestinal:  Negative for abdominal distention and abdominal pain.   Endocrine: Negative for cold intolerance and heat intolerance.   Genitourinary:  Negative for difficulty urinating and urgency.   Musculoskeletal:  Negative for arthralgias and back pain.   Skin:  Negative for color change.   Allergic/Immunologic: Negative for environmental allergies.   Neurological:  Negative for dizziness and syncope.   Hematological:  Negative for adenopathy. Does not bruise/bleed easily.   Psychiatric/Behavioral:  Negative for agitation, behavioral problems and confusion.        EXAM:   /80   Pulse 73   Temp 98.4 °F (36.9 °C) (Temporal)   Ht 5' 5\" (1.651 m)   Wt 272 lb (123.4 kg)   SpO2 97%   BMI 45.26 kg/m²     Physical Exam  Constitutional:       Appearance: Normal appearance. She is normal weight.   HENT:      Head: Normocephalic and atraumatic.      Right Ear: Tympanic membrane normal.      Left Ear: Tympanic membrane normal.      Nose: Congestion and rhinorrhea present.      Mouth/Throat:      Mouth: Mucous membranes are moist.      Pharynx: Oropharynx is clear.   Eyes:      Extraocular Movements: Extraocular movements intact.      Conjunctiva/sclera: Conjunctivae normal.      Pupils: Pupils are equal, round, and reactive to light.   Cardiovascular:      Rate and Rhythm: Normal rate and regular rhythm.      Pulses: Normal pulses.      Heart sounds: Normal heart sounds.   Pulmonary:      Effort: Pulmonary effort is  normal.      Breath sounds: Normal breath sounds.   Lymphadenopathy:      Cervical: No cervical adenopathy.   Skin:     General: Skin is warm and dry.      Capillary Refill: Capillary refill takes less than 2 seconds.   Neurological:      Mental Status: She is alert and oriented to person, place, and time.   Psychiatric:         Mood and Affect: Mood normal.         Behavior: Behavior normal.          ASSESSMENT AND PLAN:     1. Nasal congestion  Viral swab sent and patient will be contacted with results.  Recommended use of afrin over the counter twice a day for three days.  - SARS-CoV-2/Flu A and B/RSV by PCR ([place]) [E]; Future    2. Chronic cough  Patient has history of COPD and takes prednisone, albuterol, Symbicort bid and budesonide nebs. Patient is a current smoker and smokes 3 packs per week.     The patient indicates understanding of these issues and agrees to the plan.      Rosalva Crowley, APRN  1/11/2024

## 2024-01-12 LAB
FLUAV + FLUBV RNA SPEC NAA+PROBE: NOT DETECTED
FLUAV + FLUBV RNA SPEC NAA+PROBE: NOT DETECTED
RSV RNA SPEC NAA+PROBE: NOT DETECTED
SARS-COV-2 RNA RESP QL NAA+PROBE: NOT DETECTED

## 2024-01-16 ENCOUNTER — TELEPHONE (OUTPATIENT)
Dept: FAMILY MEDICINE CLINIC | Facility: CLINIC | Age: 64
End: 2024-01-16

## 2024-01-16 DIAGNOSIS — Z51.81 ANTICOAGULATION MANAGEMENT ENCOUNTER: ICD-10-CM

## 2024-01-16 DIAGNOSIS — Z79.01 ANTICOAGULATION MANAGEMENT ENCOUNTER: ICD-10-CM

## 2024-01-16 LAB — INR: 3.3 (ref 0.8–1.2)

## 2024-01-16 PROCEDURE — 85610 PROTHROMBIN TIME: CPT | Performed by: INTERNAL MEDICINE

## 2024-01-16 RX ORDER — WARFARIN SODIUM 4 MG/1
TABLET ORAL
COMMUNITY
Start: 2024-01-16

## 2024-01-16 NOTE — TELEPHONE ENCOUNTER
Decrease to 8 mg daily   Wellbutrin  mg       Last Written Prescription Date: 05/11/17  Last Fill Quantity: 180; # refills: 0  Last Office Visit with [facility], [facility] or [facility] prescribing provider:  06/16/17  Per office appointment note-   Mild recurrent major depression (H)  Comment: Doing much better with the addition of Lexapro to her Wellbutrin. Therapy has helped immensely. States her  is also starting to help.   Plan: Continue therapy and medications.  Discussed starting exercise and treating it as a self care activity.  Discussed possible weaning of medication in 6 months.   Ok to refill meds between now and mid December.        Last PHQ-9 score on record=   PHQ-9 SCORE 6/16/2017   Total Score MyChart -   Total Score 2       Prescription approved per [facility] Refill Protocol.  CASEY Bartlett RN

## 2024-01-16 NOTE — TELEPHONE ENCOUNTER
Patient advised to continue the same dose of Coumadin and recheck INR in 1 week.  She said she is still taking the Prednisone 10mg and is not feeling much better. She said she thinks that she will be on Prednisone the rest of the winter. She is taking Coumadin 8.5mg nighlty.

## 2024-01-17 DIAGNOSIS — E66.01 CLASS 3 SEVERE OBESITY DUE TO EXCESS CALORIES WITH SERIOUS COMORBIDITY AND BODY MASS INDEX (BMI) OF 40.0 TO 44.9 IN ADULT (HCC): Primary | ICD-10-CM

## 2024-01-17 DIAGNOSIS — M17.0 PRIMARY OSTEOARTHRITIS OF BOTH KNEES: ICD-10-CM

## 2024-01-17 RX ORDER — PHENTERMINE HYDROCHLORIDE 37.5 MG/1
37.5 TABLET ORAL
Qty: 30 TABLET | Refills: 0 | Status: SHIPPED | OUTPATIENT
Start: 2024-01-17

## 2024-01-17 NOTE — TELEPHONE ENCOUNTER
Future Appointments   Date Time Provider Department Center   2/29/2024  3:40 PM Kris Salinas MD [facility]     Last office visit: 7/20/2023    Last fill: 11/10/2023 30 tab, 0 refills

## 2024-01-23 ENCOUNTER — TELEPHONE (OUTPATIENT)
Dept: FAMILY MEDICINE CLINIC | Facility: CLINIC | Age: 64
End: 2024-01-23

## 2024-01-23 DIAGNOSIS — Z79.01 ANTICOAGULATION MANAGEMENT ENCOUNTER: Primary | ICD-10-CM

## 2024-01-23 DIAGNOSIS — Z51.81 ANTICOAGULATION MANAGEMENT ENCOUNTER: Primary | ICD-10-CM

## 2024-01-23 LAB — INR: 3.2 (ref 0.8–1.2)

## 2024-01-23 PROCEDURE — 85610 PROTHROMBIN TIME: CPT | Performed by: INTERNAL MEDICINE

## 2024-01-23 RX ORDER — WARFARIN SODIUM 1 MG/1
TABLET ORAL
COMMUNITY
Start: 2024-01-23

## 2024-01-23 RX ORDER — WARFARIN SODIUM 5 MG/1
TABLET ORAL
COMMUNITY
Start: 2024-01-23

## 2024-01-23 NOTE — TELEPHONE ENCOUNTER
Patient advised Dr Trent wants her to decrease the Coumadin to 7mg and recheck INr in 2 weeks. V.O. Dr Trent/Linda ASTUDILLO

## 2024-01-23 NOTE — TELEPHONE ENCOUNTER
Patient states that she has been taking coumadin 8mg. She is also taking Prednisone 10mg nightly.

## 2024-01-30 ENCOUNTER — TELEPHONE (OUTPATIENT)
Dept: FAMILY MEDICINE CLINIC | Facility: CLINIC | Age: 64
End: 2024-01-30

## 2024-01-30 LAB — INR: 2.2 (ref 0.8–1.2)

## 2024-01-30 NOTE — TELEPHONE ENCOUNTER
Home INR 2.2. Patient advised to continue Coumadin 7mg and recheck INR in 1 week. V.O. Dr Trent/Linda ASTUDILLO

## 2024-02-01 NOTE — TELEPHONE ENCOUNTER
Pt arrived today and stated she was in a lot of pain and felt like she needed to hold off on doing therapy. Therapist printed out a new copy of the home exercise program handout that was given at evaluation since she had spilled coffee on her copy. Informed Patient her next visit with therapy is 2/6/2024 at 10:45am and Patient stated she would return then.     Jameson Fonseca, PTA   2/1/2024       fyi to Dr Sharon Saba- has increased her prednisone to 20 mg daily for last 4 days due to more wheezy after being outside, knows from all the soybean dust.  Has 10 tabs left.   Refill to [facility]

## 2024-02-06 ENCOUNTER — TELEPHONE (OUTPATIENT)
Dept: FAMILY MEDICINE CLINIC | Facility: CLINIC | Age: 64
End: 2024-02-06

## 2024-02-06 DIAGNOSIS — Z79.01 ANTICOAGULATION MANAGEMENT ENCOUNTER: ICD-10-CM

## 2024-02-06 DIAGNOSIS — Z51.81 ANTICOAGULATION MANAGEMENT ENCOUNTER: ICD-10-CM

## 2024-02-06 LAB — INR: 1.7 (ref 0.8–1.2)

## 2024-02-06 RX ORDER — WARFARIN SODIUM 5 MG/1
TABLET ORAL
COMMUNITY
Start: 2024-02-06

## 2024-02-06 RX ORDER — WARFARIN SODIUM 1 MG/1
TABLET ORAL
COMMUNITY
Start: 2024-02-06

## 2024-02-06 NOTE — TELEPHONE ENCOUNTER
Patient said that she has been sick since she had the \"lung infection\".  She said that she has had abdominal pain. She thinks that she must have an infection that is now affecting her stomach, liver, and pancreas. She states the abdominal pain has been getting progressively worse over the last 30 days.   She denies fever, nausea or vomiting but states her stool has been \"softer than normal\"  She wants to see Dr Trent this afternoon and have labs and urine checked.

## 2024-02-06 NOTE — TELEPHONE ENCOUNTER
Patient said that she really doesn't feel well and wants to know if it is really a good idea to wait that long with fluid around her pancreas. She said that she is willing to see Dr Medrano if he can get her in sooner (he does not have openings today or tomorrow). If not she asked if Dr Trent can prescribe an antibiotic for her infection and refill her pain medication. Patient advised she will likely need to be evaluated to determine if she does in fact have an infection.

## 2024-02-06 NOTE — TELEPHONE ENCOUNTER
PAtient advised, appointment scheduled with Dr Trent Thursday at 415pm.   Patient states she has a h/o Lupus that has been in remission. She is seeing Dr Salinas at the end of the month.  Patient states that her INR today was 1.7, she is on 7.5mg daily but her \"usual dose is 8mg\"

## 2024-02-08 ENCOUNTER — OFFICE VISIT (OUTPATIENT)
Dept: FAMILY MEDICINE CLINIC | Facility: CLINIC | Age: 64
End: 2024-02-08
Payer: COMMERCIAL

## 2024-02-08 VITALS
DIASTOLIC BLOOD PRESSURE: 84 MMHG | TEMPERATURE: 98 F | WEIGHT: 269 LBS | OXYGEN SATURATION: 96 % | RESPIRATION RATE: 18 BRPM | HEART RATE: 77 BPM | SYSTOLIC BLOOD PRESSURE: 136 MMHG | BODY MASS INDEX: 45 KG/M2

## 2024-02-08 DIAGNOSIS — R30.0 DYSURIA: ICD-10-CM

## 2024-02-08 DIAGNOSIS — R10.13 ABDOMINAL PAIN, EPIGASTRIC: ICD-10-CM

## 2024-02-08 DIAGNOSIS — F13.20 SEDATIVE, HYPNOTIC OR ANXIOLYTIC DEPENDENCE, UNCOMPLICATED (HCC): ICD-10-CM

## 2024-02-08 DIAGNOSIS — J44.1 COPD EXACERBATION (HCC): Primary | ICD-10-CM

## 2024-02-08 DIAGNOSIS — I10 ESSENTIAL HYPERTENSION: ICD-10-CM

## 2024-02-08 LAB
APPEARANCE: CLEAR
GLUCOSE (URINE DIPSTICK): NEGATIVE MG/DL
KETONES (URINE DIPSTICK): 15 MG/DL
LEUKOCYTES: NEGATIVE
MULTISTIX LOT#: ABNORMAL NUMERIC
NITRITE, URINE: NEGATIVE
OCCULT BLOOD: NEGATIVE
PH, URINE: 6 (ref 4.5–8)
PROTEIN (URINE DIPSTICK): 30 MG/DL
SPECIFIC GRAVITY: >=1.03 (ref 1–1.03)
URINE-COLOR: YELLOW
UROBILINOGEN,SEMI-QN: 0.2 MG/DL (ref 0–1.9)

## 2024-02-08 PROCEDURE — 87186 SC STD MICRODIL/AGAR DIL: CPT | Performed by: INTERNAL MEDICINE

## 2024-02-08 PROCEDURE — 85652 RBC SED RATE AUTOMATED: CPT | Performed by: INTERNAL MEDICINE

## 2024-02-08 PROCEDURE — 87086 URINE CULTURE/COLONY COUNT: CPT | Performed by: INTERNAL MEDICINE

## 2024-02-08 PROCEDURE — 87088 URINE BACTERIA CULTURE: CPT | Performed by: INTERNAL MEDICINE

## 2024-02-08 PROCEDURE — 83690 ASSAY OF LIPASE: CPT | Performed by: INTERNAL MEDICINE

## 2024-02-08 PROCEDURE — 85025 COMPLETE CBC W/AUTO DIFF WBC: CPT | Performed by: INTERNAL MEDICINE

## 2024-02-08 PROCEDURE — 80053 COMPREHEN METABOLIC PANEL: CPT | Performed by: INTERNAL MEDICINE

## 2024-02-08 RX ORDER — ALPRAZOLAM 1 MG/1
0.5 TABLET ORAL 3 TIMES DAILY PRN
Qty: 45 TABLET | Refills: 0 | Status: SHIPPED | OUTPATIENT
Start: 2024-02-08

## 2024-02-08 RX ORDER — PREDNISONE 10 MG/1
10 TABLET ORAL DAILY
Qty: 90 TABLET | Refills: 3 | Status: SHIPPED | OUTPATIENT
Start: 2024-02-08

## 2024-02-08 RX ORDER — HYDROCODONE BITARTRATE AND ACETAMINOPHEN 5; 325 MG/1; MG/1
1 TABLET ORAL EVERY 4 HOURS PRN
Qty: 30 TABLET | Refills: 0 | Status: SHIPPED | OUTPATIENT
Start: 2024-02-08 | End: 2024-03-09

## 2024-02-08 RX ORDER — ATENOLOL 25 MG/1
25 TABLET ORAL DAILY
Qty: 90 TABLET | Refills: 0 | Status: SHIPPED | OUTPATIENT
Start: 2024-02-08

## 2024-02-08 RX ORDER — CEFPROZIL 500 MG/1
500 TABLET, FILM COATED ORAL 2 TIMES DAILY
Qty: 20 TABLET | Refills: 0 | Status: SHIPPED | OUTPATIENT
Start: 2024-02-08 | End: 2024-02-18

## 2024-02-09 LAB
ALBUMIN SERPL-MCNC: 3.7 G/DL (ref 3.4–5)
ALBUMIN/GLOB SERPL: 1 {RATIO} (ref 1–2)
ALP LIVER SERPL-CCNC: 71 U/L
ALT SERPL-CCNC: 21 U/L
ANION GAP SERPL CALC-SCNC: 4 MMOL/L (ref 0–18)
AST SERPL-CCNC: 19 U/L (ref 15–37)
BASOPHILS # BLD AUTO: 0.05 X10(3) UL (ref 0–0.2)
BASOPHILS NFR BLD AUTO: 0.5 %
BILIRUB SERPL-MCNC: 0.4 MG/DL (ref 0.1–2)
BUN BLD-MCNC: 11 MG/DL (ref 9–23)
CALCIUM BLD-MCNC: 9.3 MG/DL (ref 8.5–10.1)
CHLORIDE SERPL-SCNC: 110 MMOL/L (ref 98–112)
CO2 SERPL-SCNC: 27 MMOL/L (ref 21–32)
CREAT BLD-MCNC: 1.19 MG/DL
EGFRCR SERPLBLD CKD-EPI 2021: 51 ML/MIN/1.73M2 (ref 60–?)
EOSINOPHIL # BLD AUTO: 0.01 X10(3) UL (ref 0–0.7)
EOSINOPHIL NFR BLD AUTO: 0.1 %
ERYTHROCYTE [DISTWIDTH] IN BLOOD BY AUTOMATED COUNT: 13.2 %
ERYTHROCYTE [SEDIMENTATION RATE] IN BLOOD: 18 MM/HR
FASTING STATUS PATIENT QL REPORTED: NO
GLOBULIN PLAS-MCNC: 3.6 G/DL (ref 2.8–4.4)
GLUCOSE BLD-MCNC: 123 MG/DL (ref 70–99)
HCT VFR BLD AUTO: 44.2 %
HGB BLD-MCNC: 14.8 G/DL
IMM GRANULOCYTES # BLD AUTO: 0.11 X10(3) UL (ref 0–1)
IMM GRANULOCYTES NFR BLD: 1.1 %
LIPASE SERPL-CCNC: 30 U/L (ref ?–300)
LYMPHOCYTES # BLD AUTO: 1.02 X10(3) UL (ref 1–4)
LYMPHOCYTES NFR BLD AUTO: 10.3 %
MCH RBC QN AUTO: 31.6 PG (ref 26–34)
MCHC RBC AUTO-ENTMCNC: 33.5 G/DL (ref 31–37)
MCV RBC AUTO: 94.2 FL
MONOCYTES # BLD AUTO: 0.51 X10(3) UL (ref 0.1–1)
MONOCYTES NFR BLD AUTO: 5.2 %
NEUTROPHILS # BLD AUTO: 8.18 X10 (3) UL (ref 1.5–7.7)
NEUTROPHILS # BLD AUTO: 8.18 X10(3) UL (ref 1.5–7.7)
NEUTROPHILS NFR BLD AUTO: 82.8 %
OSMOLALITY SERPL CALC.SUM OF ELEC: 293 MOSM/KG (ref 275–295)
PLATELET # BLD AUTO: 258 10(3)UL (ref 150–450)
POTASSIUM SERPL-SCNC: 3.9 MMOL/L (ref 3.5–5.1)
PROT SERPL-MCNC: 7.3 G/DL (ref 6.4–8.2)
RBC # BLD AUTO: 4.69 X10(6)UL
SODIUM SERPL-SCNC: 141 MMOL/L (ref 136–145)
WBC # BLD AUTO: 9.9 X10(3) UL (ref 4–11)

## 2024-02-09 RX ORDER — PANTOPRAZOLE SODIUM 40 MG/1
40 TABLET, DELAYED RELEASE ORAL
Qty: 90 TABLET | Refills: 0 | Status: SHIPPED | OUTPATIENT
Start: 2024-02-09 | End: 2024-05-09

## 2024-02-10 NOTE — PROGRESS NOTES
Tila Cassidy is a 63 year old female.  HPI:   Pt is a smoker with cough for 2 months, at times productive, no fever.  She had intense abdomen pain for a few days, so much so that she thought she had pancreatitis, like she had in the past, but her GB was removed. The pressure form her BRA was too much.  She increased her prednisone form 5 to 20 mg on her own.  Some diarrhea, no blood in the stool.   Current Outpatient Medications   Medication Sig Dispense Refill    pantoprazole 40 MG Oral Tab EC Take 1 tablet (40 mg total) by mouth every morning before breakfast. 90 tablet 0    ALPRAZolam 1 MG Oral Tab Take 0.5 tablets (0.5 mg total) by mouth 3 (three) times daily as needed for Anxiety or Sleep. 45 tablet 0    atenolol 25 MG Oral Tab Take 1 tablet (25 mg total) by mouth daily. 90 tablet 0    HYDROcodone-acetaminophen (NORCO) 5-325 MG Oral Tab Take 1 tablet by mouth every 4 (four) hours as needed for Pain. 30 tablet 0    predniSONE 10 MG Oral Tab Take 1 tablet (10 mg total) by mouth daily. 90 tablet 3    Cefprozil 500 MG Oral Tab Take 1 tablet (500 mg total) by mouth 2 (two) times daily for 10 days. 20 tablet 0    warfarin 1 MG Oral Tab Take 3 tabs with 5mg to equal 8mg nighlty      warfarin 5 MG Oral Tab Take with 3mg to equal 8mg nighlty      Phentermine HCl 37.5 MG Oral Tab Take 1 tablet (37.5 mg total) by mouth before breakfast. 30 tablet 0    ipratropium-albuterol 0.5-2.5 (3) MG/3ML Inhalation Solution Take 3 mL by nebulization every 6 (six) hours as needed. 360 mL 11    BENAZEPRIL HCL 20 MG Oral Tab TAKE 1 TABLET(20 MG) BY MOUTH DAILY 90 tablet 1    predniSONE 2.5 MG Oral Tab Take 1 tablet (2.5 mg total) by mouth daily. 90 tablet 1    hydroxychloroquine 200 MG Oral Tab TAKE 2 TABLETS BY MOUTH EVERY  tablet 2    albuterol 108 (90 Base) MCG/ACT Inhalation Aero Soln Inhale 2 puffs into the lungs every 6 (six) hours as needed for Wheezing or Shortness of Breath. 1 each 11    mupirocin 2 % External Ointment  Apply topically 3 (three) times daily.      Mometasone Furoate 0.1 % External Cream Apply 1 Application topically 2 (two) times daily as needed. 45 g 0    cholecalciferol 50 MCG (2000 UT) Oral Tab Take 1 tablet (2,000 Units total) by mouth daily.      folic acid 1 MG Oral Tab Take 2 tablets (2 mg total) by mouth daily. (Patient not taking: Reported on 11/21/2023) 60 tablet 11      Past Medical History:   Diagnosis Date    Adenomyomatosis of gallbladder 11/1/2018    Adrenal insufficiency (HCC)     due to steroids    Anxiety state     Chronic low back pain     COPD (chronic obstructive pulmonary disease) ([facility])     PFTs 8/2016, [facility]    Fibromyalgia 1/15/2020    Hearing impairment     bilateral hearing loss    History of cervical dysplasia 1/19/2017    History of colon polyps 4/9/2018    History of pulmonary embolus (PE)     2014, right sided, at time was driving 8 hrs for work everyday     HTN (hypertension)     JOHN (obstructive sleep apnea)     Unable to tolerate CPAP    Positive cardiolipin antibodies     Borderline positive, IgG    Primary osteoarthritis of both hips 11/13/2014    Primary osteoarthritis of both knees 1/15/2020    SLE (systemic lupus erythematosus) ([facility])     sister and brother also have    Spondylosis of lumbar region without myelopathy or radiculopathy 1/15/2020    Tubular adenoma of colon 1/19/2017    Visual impairment     glasses for reading    Warfarin anticoagulation 7/19/2017      Social History:  Social History     Socioeconomic History    Marital status:    Tobacco Use    Smoking status: Every Day     Packs/day: 0.50     Years: 40.00     Additional pack years: 0.00     Total pack years: 20.00     Types: Cigarettes    Smokeless tobacco: Never    Tobacco comments:     cutting back certainly now   Substance and Sexual Activity    Alcohol use: Yes     Alcohol/week: 0.0 standard drinks of alcohol     Comment: social    Drug use: No        REVIEW OF SYSTEMS:   GENERAL HEALTH: feels  well otherwise  SKIN: denies any unusual skin lesions or rashes  RESPIRATORY: denies shortness of breath with exertion  CARDIOVASCULAR: denies chest pain on exertion  GI: denies abdominal pain and denies heartburn  NEURO: denies headaches    EXAM:   /84   Pulse 77   Temp 97.8 °F (36.6 °C) (Temporal)   Resp 18   Wt 269 lb (122 kg)   SpO2 96%   BMI 44.76 kg/m²   GENERAL: well developed, well nourished,in no apparent distress  SKIN: no rashes,no suspicious lesions  HEENT: atraumatic, normocephalic,ears and throat are clear  NECK: supple,no adenopathy,no bruits  LUNGS: clear to auscultation  CARDIO: RRR without murmur  GI: good BS's,no masses, HSM or tenderness  EXTREMITIES: no cyanosis, clubbing or edema    ASSESSMENT AND PLAN:     Encounter Diagnoses   Name Primary?    Sedative, hypnotic or anxiolytic dependence, uncomplicated (HCC)     Essential hypertension     Dysuria     Abdominal pain, epigastric     COPD exacerbation (HCC) Yes     Urine looks fine. Lipase was negative, antibiotic for lungs and needs to wean form steroids, has relative adrenal insufficiency due to long term use. Opioid and benzo dependent.   Orders Placed This Encounter   Procedures    Lipase [E]    Comp Metabolic Panel (14) [E]    CBC W Differential W Platelet [E]    Sed Rate, Westergren (Automated) [E]    Urine Dip, auto without Micro    Urine Culture, Routine [E]       Meds & Refills for this Visit:  Requested Prescriptions     Signed Prescriptions Disp Refills    ALPRAZolam 1 MG Oral Tab 45 tablet 0     Sig: Take 0.5 tablets (0.5 mg total) by mouth 3 (three) times daily as needed for Anxiety or Sleep.    atenolol 25 MG Oral Tab 90 tablet 0     Sig: Take 1 tablet (25 mg total) by mouth daily.    HYDROcodone-acetaminophen (NORCO) 5-325 MG Oral Tab 30 tablet 0     Sig: Take 1 tablet by mouth every 4 (four) hours as needed for Pain.    predniSONE 10 MG Oral Tab 90 tablet 3     Sig: Take 1 tablet (10 mg total) by mouth daily.     Cefprozil 500 MG Oral Tab 20 tablet 0     Sig: Take 1 tablet (500 mg total) by mouth 2 (two) times daily for 10 days.       Imaging & Consults:  None    Follow up as needed.     The patient indicates understanding of these issues and agrees to the plan.

## 2024-02-12 ENCOUNTER — TELEPHONE (OUTPATIENT)
Dept: FAMILY MEDICINE CLINIC | Facility: CLINIC | Age: 64
End: 2024-02-12

## 2024-02-12 RX ORDER — LEVOFLOXACIN 500 MG/1
500 TABLET, FILM COATED ORAL DAILY
Qty: 10 TABLET | Refills: 0 | Status: SHIPPED | OUTPATIENT
Start: 2024-02-12 | End: 2024-02-22

## 2024-02-12 NOTE — TELEPHONE ENCOUNTER
----- Message from Jami Trent MD sent at 2/12/2024  7:36 AM CST -----  Ecoli- multi resistant. She has an unsure allergy to CIPRO . Has she taken Levaquin with side effects?

## 2024-02-12 NOTE — TELEPHONE ENCOUNTER
What happened when she took CIPRO? There are very few options for treatment of the UTI, this is the only ORAL, all others IV

## 2024-02-12 NOTE — TELEPHONE ENCOUNTER
Patient advised.  States is feeling better, but her stomach is still easily aggravated.  Has been eating pretzels and dill pickles.  Had ham and scrambled eggs for breakfast.   Is unsure if she has had Levaquin in the past or not.

## 2024-02-13 ENCOUNTER — TELEPHONE (OUTPATIENT)
Dept: RHEUMATOLOGY | Facility: CLINIC | Age: 64
End: 2024-02-13

## 2024-02-13 NOTE — TELEPHONE ENCOUNTER
Tila called  she had question about Levaquin which the primary doctor was planning to use.  Told her to discuss why he wants to use Leaquin, any other options.  Levaquin has been associated with tendonitis and tendon rupture in some patients.  Dr. Salinas

## 2024-02-27 ENCOUNTER — TELEPHONE (OUTPATIENT)
Dept: FAMILY MEDICINE CLINIC | Facility: CLINIC | Age: 64
End: 2024-02-27

## 2024-02-27 LAB — INR: 3.1 (ref 0.8–1.2)

## 2024-02-27 NOTE — TELEPHONE ENCOUNTER
Patient notified of provider comments/recommendations.  Verbalized understanding and reports current dose of coumadin is 8 mg daily.

## 2024-02-29 ENCOUNTER — OFFICE VISIT (OUTPATIENT)
Dept: RHEUMATOLOGY | Facility: CLINIC | Age: 64
End: 2024-02-29
Payer: COMMERCIAL

## 2024-02-29 VITALS
RESPIRATION RATE: 18 BRPM | OXYGEN SATURATION: 100 % | BODY MASS INDEX: 43.15 KG/M2 | TEMPERATURE: 97 F | HEART RATE: 78 BPM | SYSTOLIC BLOOD PRESSURE: 126 MMHG | DIASTOLIC BLOOD PRESSURE: 76 MMHG | HEIGHT: 65 IN | WEIGHT: 259 LBS

## 2024-02-29 DIAGNOSIS — M17.0 PRIMARY OSTEOARTHRITIS OF BOTH KNEES: ICD-10-CM

## 2024-02-29 DIAGNOSIS — M32.13 OTHER SYSTEMIC LUPUS ERYTHEMATOSUS WITH LUNG INVOLVEMENT (HCC): Primary | ICD-10-CM

## 2024-02-29 DIAGNOSIS — Z86.711 HISTORY OF PULMONARY EMBOLUS (PE): ICD-10-CM

## 2024-02-29 DIAGNOSIS — E66.01 CLASS 3 SEVERE OBESITY DUE TO EXCESS CALORIES WITH SERIOUS COMORBIDITY AND BODY MASS INDEX (BMI) OF 40.0 TO 44.9 IN ADULT (HCC): ICD-10-CM

## 2024-02-29 DIAGNOSIS — J44.9 CHRONIC OBSTRUCTIVE PULMONARY DISEASE, UNSPECIFIED COPD TYPE (HCC): ICD-10-CM

## 2024-02-29 DIAGNOSIS — Z79.01 CHRONIC ANTICOAGULATION: ICD-10-CM

## 2024-02-29 PROBLEM — N18.30 CKD (CHRONIC KIDNEY DISEASE) STAGE 3, GFR 30-59 ML/MIN (HCC): Status: RESOLVED | Noted: 2020-01-09 | Resolved: 2024-02-29

## 2024-02-29 PROCEDURE — 3074F SYST BP LT 130 MM HG: CPT | Performed by: INTERNAL MEDICINE

## 2024-02-29 PROCEDURE — 99214 OFFICE O/P EST MOD 30 MIN: CPT | Performed by: INTERNAL MEDICINE

## 2024-02-29 PROCEDURE — 3008F BODY MASS INDEX DOCD: CPT | Performed by: INTERNAL MEDICINE

## 2024-02-29 PROCEDURE — 3078F DIAST BP <80 MM HG: CPT | Performed by: INTERNAL MEDICINE

## 2024-02-29 RX ORDER — CEFPROZIL 500 MG/1
500 TABLET, FILM COATED ORAL 2 TIMES DAILY
Qty: 20 TABLET | Refills: 1 | Status: SHIPPED | OUTPATIENT
Start: 2024-02-29

## 2024-02-29 RX ORDER — PHENTERMINE HYDROCHLORIDE 37.5 MG/1
37.5 TABLET ORAL
Qty: 30 TABLET | Refills: 3 | Status: SHIPPED | OUTPATIENT
Start: 2024-02-29

## 2024-02-29 NOTE — PROGRESS NOTES
[facility]  Dr. Salinas Progress Note     Subjective:   Tila Cassidy is a(n) 63 year old female.   Current complaints:   Chief Complaint   Patient presents with    Follow - Up     Pt states she's doing well today. Her knees and hips were hurting yesterday. Would like to know if drinking Mullein tea is okay with all the meds she's taking. Would also like to know if Phentermine can be taken as needed or on a regular basis. Pt states INR  has been ranging between 1.6 and 3.1. Pt says she been sick from the pneumo vaccine that was given during wellness visit and was experiencing stomach pain, sinus pain, and neck pain. Pt states she was coughing up blood on 02/06/2024.   History of Lupus, On Plaquenil 200 mg twice a day.  Has a 11 year old granddaughter who Tila is home schooling.  Feeling a bit better.    Had a pneumonia vaccine Pneumo 20 - got sick after receiving it. On Prednisone 10 mg per day.  When at 5 mg of Prednisone felt lousy.    Wants to use Mullein Tea for her condition.   Had a lose of appetite.  Has a baby puppy.  11 weeks old.  A terrier mix.  Also has a large dog .    Objective:   /76   Pulse 78   Temp 96.7 °F (35.9 °C)   Resp 18   Ht 5' 5\" (1.651 m)   Wt 259 lb (117.5 kg)   SpO2 100%   BMI 43.10 kg/m²   Lungs few wheezes   Heart nsr    Forearms bruise marks  Abd obese  Hands non deformed  Knees ok  2/8/24  ESR  18  CBC  wbc 9.9  hb  14.8  plats   258,000  TP  7.3  ALB  3.7   BUN 11 Creat  1.18   11/24/21  SIRIA  negative   6/4/19  SIRIA positive 1:2560 titer nuclear pattern   Assessment:     Encounter Diagnoses   Name Primary?    Other systemic lupus erythematosus with lung involvement (HCC) Yes    Class 3 severe obesity due to excess calories with serious comorbidity and body mass index (BMI) of 40.0 to 44.9 in adult (HCC)     Primary osteoarthritis of both knees     History of pulmonary embolus (PE)     Chronic obstructive pulmonary disease, unspecified COPD type (HCC)     Chronic  anticoagulation-coumadin      Plan:     Patient Instructions   Continue Plaquenil 200 mg twice a day.  Eye exam yearly.  Phentermine for energy 37.5 mg once a day as needed.  Prednisone use as little as possible.  Currently 10 mg per day, try to lower to 5 mg per day.   Walk for exercise.  Use your inhaler as needed for shortness of breath/wheezing.  Current labs - CBC normal, sed rate 18 normal, CMP ok except boderline creatinine - 1.18- kidney test.   Drink water.  Walk for exercise.  Return to office 6 months.         Kris Salinas MD 2/29/2024 4:02 PM

## 2024-02-29 NOTE — PATIENT INSTRUCTIONS
Continue Plaquenil 200 mg twice a day.  Eye exam yearly.  Phentermine for energy 37.5 mg once a day as needed.  Prednisone use as little as possible.  Currently 10 mg per day, try to lower to 5 mg per day.   Walk for exercise.  Use your inhaler as needed for shortness of breath/wheezing.  Current labs - CBC normal, sed rate 18 normal, CMP ok except boderline creatinine - 1.18- kidney test.   Drink water.  Walk for exercise.  Return to office 6 months.

## 2024-03-04 ENCOUNTER — MED REC SCAN ONLY (OUTPATIENT)
Dept: FAMILY MEDICINE CLINIC | Facility: CLINIC | Age: 64
End: 2024-03-04

## 2024-03-05 ENCOUNTER — TELEPHONE (OUTPATIENT)
Dept: FAMILY MEDICINE CLINIC | Facility: CLINIC | Age: 64
End: 2024-03-05

## 2024-03-05 DIAGNOSIS — F13.20 SEDATIVE, HYPNOTIC OR ANXIOLYTIC DEPENDENCE, UNCOMPLICATED (HCC): ICD-10-CM

## 2024-03-05 LAB — INR: 2 (ref 0.8–1.2)

## 2024-03-05 RX ORDER — ALPRAZOLAM 1 MG/1
0.5 TABLET ORAL 3 TIMES DAILY PRN
Qty: 45 TABLET | Refills: 1 | Status: SHIPPED | OUTPATIENT
Start: 2024-03-05

## 2024-03-05 NOTE — TELEPHONE ENCOUNTER
Verified with patient she is requesting refill on alprazolam    LOV  2-8-24    LAST LAB  2-9-24    LAST RX  2-8-24  #45    Next OV    Future Appointments   Date Time Provider Department Center   8/29/2024 12:20 PM [facility]

## 2024-03-05 NOTE — TELEPHONE ENCOUNTER
Faxed received from [facility]  PT/INR Self Testing Service     INR 2.0 test date  3-5-24    Current dose 8 mg daily    See lab result for further details

## 2024-03-08 RX ORDER — BENAZEPRIL HYDROCHLORIDE 20 MG/1
20 TABLET ORAL DAILY
Qty: 90 TABLET | Refills: 1 | Status: SHIPPED | OUTPATIENT
Start: 2024-03-08

## 2024-03-08 NOTE — TELEPHONE ENCOUNTER
Hypertension Medications Protocol [id number]/08/2024 02:59 PM   Protocol Details CMP or BMP in past 12 months    Last BP reading less than 140/90    In person appointment or virtual visit in the past 12 mos or appointment in next 3 mos    EGFRCR or GFRNAA > 50     Last refill - 8/29/23 - #90 with 1 refill  Last CMP = 2/9/24 - EGFR - 51  Last b/p - 2/29/24 - 126/76  Last office visit - 2/29/24  Refilled per protocol

## 2024-03-12 ENCOUNTER — TELEPHONE (OUTPATIENT)
Dept: FAMILY MEDICINE CLINIC | Facility: CLINIC | Age: 64
End: 2024-03-12

## 2024-03-12 LAB — INR: 2.2 (ref 0.8–1.2)

## 2024-03-12 NOTE — TELEPHONE ENCOUNTER
Faxed received from [facility]  PT/INR Self Testing Service      INR 2.2  test date  3-12-24     Current dose 8 mg daily    Routed to provider,see lab results for further details

## 2024-03-20 ENCOUNTER — TELEPHONE (OUTPATIENT)
Dept: FAMILY MEDICINE CLINIC | Facility: CLINIC | Age: 64
End: 2024-03-20

## 2024-03-20 NOTE — TELEPHONE ENCOUNTER
Received via fax note from Dr Mast inquiring if patient can hold warfarin for 5 days prior to EGD scheduled on 4-4-24 as an outpatient at [facility].    Dr Medrano carefully reviewed and documented :  Agree she can hold warfarin for 5 days prior to 4-4-24 procedure.    Document faxed back to Dr Santillan' office at [phone]    Mary Beth at Dr Santillan' office notified    Left message for patience to call back

## 2024-03-26 ENCOUNTER — TELEPHONE (OUTPATIENT)
Dept: FAMILY MEDICINE CLINIC | Facility: CLINIC | Age: 64
End: 2024-03-26

## 2024-03-26 LAB — INR: 2.1 (ref 0.8–1.2)

## 2024-03-26 NOTE — TELEPHONE ENCOUNTER
Faxed received from [facility]  PT/INR Self Testing Service      INR 2.1 test date  3-26-24     Current dose 8 mg daily     Routed to provider,see lab results for further details

## 2024-04-03 ENCOUNTER — TELEPHONE (OUTPATIENT)
Dept: FAMILY MEDICINE CLINIC | Facility: CLINIC | Age: 64
End: 2024-04-03

## 2024-04-03 DIAGNOSIS — R10.13 ABDOMINAL PAIN, EPIGASTRIC: ICD-10-CM

## 2024-04-03 LAB — INR: 1.2 (ref 0.8–1.2)

## 2024-04-03 RX ORDER — HYDROCODONE BITARTRATE AND ACETAMINOPHEN 5; 325 MG/1; MG/1
1 TABLET ORAL EVERY 4 HOURS PRN
Qty: 30 TABLET | Refills: 0 | Status: SHIPPED | OUTPATIENT
Start: 2024-04-03 | End: 2024-05-03

## 2024-04-03 NOTE — TELEPHONE ENCOUNTER
Fax received from [facility] PT/INR self testing service  with INR result of 1.2   Collected on 4-3-24.    Current dose is 8 mg daily      See telephone encounter scheduled for EGD 4-4-24 and will be off coumadin 5 days prior.    Result entered and faxed to provider, see result notes for further details.

## 2024-04-03 NOTE — TELEPHONE ENCOUNTER
Request for refill Norco 5-325    LOV  2-8-24    LAST LAB  2-9-24    LAST RX  2-8-24  #30    Next OV    Future Appointments   Date Time Provider Department Center   8/29/2024 12:20 PM Kris Salinas MD [facility]

## 2024-04-08 DIAGNOSIS — Z51.81 ANTICOAGULATION MANAGEMENT ENCOUNTER: ICD-10-CM

## 2024-04-08 DIAGNOSIS — Z79.01 ANTICOAGULATION MANAGEMENT ENCOUNTER: ICD-10-CM

## 2024-04-09 ENCOUNTER — TELEPHONE (OUTPATIENT)
Dept: FAMILY MEDICINE CLINIC | Facility: CLINIC | Age: 64
End: 2024-04-09

## 2024-04-09 DIAGNOSIS — F13.20 SEDATIVE, HYPNOTIC OR ANXIOLYTIC DEPENDENCE, UNCOMPLICATED (HCC): ICD-10-CM

## 2024-04-09 LAB — INR: 1.5 (ref 0.8–1.2)

## 2024-04-09 RX ORDER — ALPRAZOLAM 1 MG/1
0.5 TABLET ORAL 3 TIMES DAILY PRN
Qty: 45 TABLET | Refills: 1 | Status: SHIPPED | OUTPATIENT
Start: 2024-04-09

## 2024-04-09 RX ORDER — WARFARIN SODIUM 4 MG/1
TABLET ORAL
Qty: 180 TABLET | Refills: 0 | Status: SHIPPED | OUTPATIENT
Start: 2024-04-09

## 2024-04-09 NOTE — TELEPHONE ENCOUNTER
Last refill: 01/02/24  Qty: 180  W/ 0 refills  Last ov: 02/08/24    Requested Prescriptions     Pending Prescriptions Disp Refills    WARFARIN 4 MG Oral Tab [Pharmacy Med Name: WARFARIN SOD 4MG TABLETS (BLUE)] 180 tablet 0     Sig: TAKE 2 TABLETS BY MOUTH EVERY DAY AS DIRECTED     Future Appointments   Date Time Provider Department Center   8/29/2024 12:20 PM Kris Salinas MD [facility] [place]

## 2024-04-09 NOTE — TELEPHONE ENCOUNTER
Faxed received from [facility]  PT/INR Self Testing Service      INR 1.5  test date  4-9-24     Current dose 8 mg daily     Routed to provider,see lab results for further details

## 2024-04-09 NOTE — TELEPHONE ENCOUNTER
Request for refill on alprazolam    LOV  2-8-24 Dr Trent    LAST LAB  NA    LAST RX  3-7-24  #45    Next OV    Future Appointments   Date Time Provider Department Center   8/29/2024 12:20 PM Kris Salinas MD [facility] [place]

## 2024-04-09 NOTE — TELEPHONE ENCOUNTER
Dr Medrano reviewed fax and documented check in one week.  Verbal order to continue with same dose of 8 mg daily since was off for procedure on 4-4-24.    Patient notified and expressed understanding.

## 2024-04-16 ENCOUNTER — TELEPHONE (OUTPATIENT)
Dept: FAMILY MEDICINE CLINIC | Facility: CLINIC | Age: 64
End: 2024-04-16

## 2024-04-16 LAB — INR: 2.1 (ref 0.8–1.2)

## 2024-04-16 NOTE — TELEPHONE ENCOUNTER
Faxed received from [facility]  PT/INR Self Testing Service      INR 2.1  test date  4-16-24     Current dose 8 mg daily     Routed to provider,see lab results for further details    Copy to scan

## 2024-04-23 ENCOUNTER — TELEPHONE (OUTPATIENT)
Dept: FAMILY MEDICINE CLINIC | Facility: CLINIC | Age: 64
End: 2024-04-23

## 2024-04-23 LAB — INR: 2 (ref 0.8–1.2)

## 2024-04-23 NOTE — TELEPHONE ENCOUNTER
Faxed received from [facility]  PT/INR Self Testing Service      INR 2.0  test date  4-23-24     Current dose 8 mg daily     Routed to provider,see lab results for further details

## 2024-04-30 ENCOUNTER — TELEPHONE (OUTPATIENT)
Dept: FAMILY MEDICINE CLINIC | Facility: CLINIC | Age: 64
End: 2024-04-30

## 2024-04-30 LAB — INR: 2.1 (ref 0.8–1.2)

## 2024-04-30 NOTE — TELEPHONE ENCOUNTER
Faxed received from [facility]  PT/INR Self Testing Service      INR 2.1  test date 4-30-24     Current dose 8 mg daily     Routed to provider,see lab results for further details

## 2024-05-01 DIAGNOSIS — I10 ESSENTIAL HYPERTENSION: ICD-10-CM

## 2024-05-01 RX ORDER — ATENOLOL 25 MG/1
25 TABLET ORAL DAILY
Qty: 90 TABLET | Refills: 0 | Status: SHIPPED | OUTPATIENT
Start: 2024-05-01

## 2024-05-01 NOTE — TELEPHONE ENCOUNTER
LOV: 2-8-24    LAST LAB:4/29/24    LAST RX:  Medication Quantity Refills Start End   atenolol 25 MG Oral Tab 90 tablet 0 2/8/2024 --   Sig:   Take 1 tablet (25 mg total) by mouth         Next OV:   Future Appointments   Date Time Provider Department Center   5/13/2024  3:20 PM Sandip Medrano MD [facility] [place]   8/29/2024 12:20 PM Kris Salinas MD [facility] [place]          PROTOCOL    Hypertension Medications Protocol [id number]/01/2024 12:38 PM   Protocol Details CMP or BMP in past 12 months    Last BP reading less than 140/90    In person appointment or virtual visit in the past 12 mos or appointment in next 3 mos    EGFRCR or GFRNAA > 50

## 2024-05-07 ENCOUNTER — TELEPHONE (OUTPATIENT)
Dept: FAMILY MEDICINE CLINIC | Facility: CLINIC | Age: 64
End: 2024-05-07

## 2024-05-07 LAB — INR: 2.4 (ref 0.8–1.2)

## 2024-05-07 NOTE — TELEPHONE ENCOUNTER
Faxed received from [facility]  PT/INR Self Testing Service      INR 2.4  test date 5-7-24     Current dose 8 mg daily     Routed to provider,see lab results for further details

## 2024-05-08 DIAGNOSIS — M32.9 SYSTEMIC LUPUS ERYTHEMATOSUS, UNSPECIFIED SLE TYPE, UNSPECIFIED ORGAN INVOLVEMENT STATUS (HCC): Primary | ICD-10-CM

## 2024-05-08 RX ORDER — HYDROXYCHLOROQUINE SULFATE 200 MG/1
TABLET, FILM COATED ORAL
Qty: 180 TABLET | Refills: 2 | Status: SHIPPED | OUTPATIENT
Start: 2024-05-08

## 2024-05-08 NOTE — TELEPHONE ENCOUNTER
Last office visit: 2/29/2024    Next Rheum Apt:8/29/2024 Kris Salinas MD    Last fill: 7/20/2023  180 tabs, 2 refills     Labs:   Lab Results   Component Value Date    CREATSERUM 1.19 (H) 02/08/2024    GFR 48 (L) 03/23/2017    ALKPHO 71 02/08/2024    AST 19 02/08/2024    ALT 21 02/08/2024    BILT 0.4 02/08/2024    TP 7.3 02/08/2024    ALB 3.7 02/08/2024       Lab Results   Component Value Date    WBC 9.9 02/08/2024    HGB 14.8 02/08/2024    .0 02/08/2024    NEPRELIM 8.18 (H) 02/08/2024    NEPERCENT 82.8 02/08/2024    LYPERCENT 10.3 02/08/2024    NE 8.18 (H) 02/08/2024    LYMABS 1.02 02/08/2024

## 2024-05-08 NOTE — TELEPHONE ENCOUNTER
Hydroxychloroquine refill approved 200 mg 1 twice daily home #180 sent to [facility] in [place].

## 2024-05-13 ENCOUNTER — OFFICE VISIT (OUTPATIENT)
Dept: FAMILY MEDICINE CLINIC | Facility: CLINIC | Age: 64
End: 2024-05-13

## 2024-05-13 VITALS
OXYGEN SATURATION: 95 % | SYSTOLIC BLOOD PRESSURE: 128 MMHG | HEART RATE: 81 BPM | BODY MASS INDEX: 44 KG/M2 | WEIGHT: 265.13 LBS | RESPIRATION RATE: 12 BRPM | DIASTOLIC BLOOD PRESSURE: 70 MMHG | TEMPERATURE: 97 F

## 2024-05-13 DIAGNOSIS — N90.89 VULVAL LESION: ICD-10-CM

## 2024-05-13 DIAGNOSIS — I10 PRIMARY HYPERTENSION: ICD-10-CM

## 2024-05-13 DIAGNOSIS — M17.0 PRIMARY OSTEOARTHRITIS OF BOTH KNEES: ICD-10-CM

## 2024-05-13 DIAGNOSIS — M79.7 FIBROMYALGIA: ICD-10-CM

## 2024-05-13 DIAGNOSIS — Z79.899 ENCOUNTER FOR LONG-TERM (CURRENT) USE OF MEDICATIONS: Primary | ICD-10-CM

## 2024-05-13 DIAGNOSIS — J44.9 CHRONIC OBSTRUCTIVE PULMONARY DISEASE, UNSPECIFIED COPD TYPE (HCC): ICD-10-CM

## 2024-05-13 DIAGNOSIS — R10.13 ABDOMINAL PAIN, EPIGASTRIC: ICD-10-CM

## 2024-05-13 DIAGNOSIS — Z79.01 CHRONIC ANTICOAGULATION: ICD-10-CM

## 2024-05-13 RX ORDER — HYDROCODONE BITARTRATE AND ACETAMINOPHEN 5; 325 MG/1; MG/1
1 TABLET ORAL EVERY 4 HOURS PRN
Qty: 30 TABLET | Refills: 0 | Status: SHIPPED | OUTPATIENT
Start: 2024-05-13 | End: 2024-06-12

## 2024-05-13 RX ORDER — PANTOPRAZOLE SODIUM 40 MG/1
40 TABLET, DELAYED RELEASE ORAL 2 TIMES DAILY
COMMUNITY
Start: 2024-04-04

## 2024-05-13 RX ORDER — BUDESONIDE AND FORMOTEROL FUMARATE DIHYDRATE 160; 4.5 UG/1; UG/1
2 AEROSOL RESPIRATORY (INHALATION) 2 TIMES DAILY
COMMUNITY
Start: 2024-03-29

## 2024-05-13 NOTE — PROGRESS NOTES
Subjective:   Tila Cassidy is a 63 year old female who presents for Consult     [facility] to [facility]  Chronic lung issues  On current medications    BP shows good control with last BP of 128/70. Continue lifestyle changes, diet, exercise and weight loss.   Last K was 3.9 done on 2/8/2024.  Last Cr was 1.19 done on 2/8/2024.  Last eGFR was 51 on 2/8/2024.  BP Meds: atenolol Tabs - 25 MG; Benazepril HCl Tabs - 20 MG     Copd   is under Fair control and Good compliance.  Asthma controller Meds:   Medications       Sympathomimetics Instructions     Budesonide-Formoterol Fumarate 160-4.5 MCG/ACT Inhalation Aerosol Inhale 2 puffs into the lungs 2 (two) times daily.     ipratropium-albuterol 0.5-2.5 (3) MG/3ML Inhalation Solution Take 3 mL by nebulization every 6 (six) hours as needed.     albuterol 108 (90 Base) MCG/ACT Inhalation Aero Soln Inhale 2 puffs into the lungs every 6 (six) hours as needed for Wheezing or Shortness of Breath.     ipratropium-albuterol (Duoneb) 0.5-2.5 (3) MG/3ML inhalation solution 3 mL 3 mL, Nebulization, Once              History/Other:   has a current medication list which includes the following prescription(s): pantoprazole, budesonide-formoterol fumarate, hydrocodone-acetaminophen, hydroxychloroquine, atenolol, warfarin, alprazolam, benazepril hcl, phentermine hcl, cefprozil, prednisone, warfarin, warfarin, ipratropium-albuterol, albuterol, mupirocin, mometasone furoate, folic acid, cholecalciferol, and prednisone, and the following Facility-Administered Medications: ipratropium-albuterol.     is allergic to magnesium, potassium chloride, augmentin [amoxicillin-pot clavulanate], ciprofloxacin, ibuprofen, nsaids, sulfa antibiotics, and clindamycin.     Review of Systems:  GENERAL HEALTH: feels well no complaints  SKIN: denies any unusual skin lesions or rashes  RESPIRATORY: denies shortness of breath with exertion  CARDIOVASCULAR: denies chest pain on exertion  GI: denies abdominal pain and  denies heartburn  NEURO: denies headaches    Objective:   /70 (BP Location: Left arm, Patient Position: Sitting, Cuff Size: large)   Pulse 81   Temp 97.3 °F (36.3 °C) (Temporal)   Resp 12   Wt 265 lb 2 oz (120.3 kg)   SpO2 95%   BMI 44.12 kg/m²  Estimated body mass index is 44.12 kg/m² as calculated from the following:    Height as of 2/29/24: 5' 5\" (1.651 m).    Weight as of this encounter: 265 lb 2 oz (120.3 kg).  GENERAL: well developed, well nourished,in no apparent distress  SKIN: no rashes,no suspicious lesions  HEENT: atraumatic, normocephalic,ears and throat are clear  NECK: supple,no adenopathy,no bruits  LUNGS: clear to auscultation  CARDIO: RRR without murmur  GI: good BS's,no masses, HSM or tenderness  EXTREMITIES: no cyanosis, clubbing or edema    Assessment & Plan:   1. Encounter for long-term (current) use of medications (Primary)  2. Vulval lesion  -     OBG Referral - [facility] ([place])  3. Fibromyalgia  4. Primary hypertension  5. Chronic obstructive pulmonary disease, unspecified COPD type (HCC)  6. Chronic anticoagulation-coumadin  7. Abdominal pain, epigastric  8. Primary osteoarthritis of both knees  -     HYDROcodone-Acetaminophen; Take 1 tablet by mouth every 4 (four) hours as needed for Pain.  Dispense: 30 tablet; Refill: 0          Sandip Medrano MD, 5/13/2024, 3:46 PM

## 2024-05-14 ENCOUNTER — TELEPHONE (OUTPATIENT)
Dept: FAMILY MEDICINE CLINIC | Facility: CLINIC | Age: 64
End: 2024-05-14

## 2024-05-14 LAB — INR: 2.5 (ref 0.8–1.2)

## 2024-05-14 NOTE — TELEPHONE ENCOUNTER
Faxed received from [facility]  PT/INR Self Testing Service      INR 2.5  test date 5-14-24     Current dose 8 mg daily     Routed to provider,see lab results for further details

## 2024-05-21 ENCOUNTER — TELEPHONE (OUTPATIENT)
Dept: FAMILY MEDICINE CLINIC | Facility: CLINIC | Age: 64
End: 2024-05-21

## 2024-05-21 LAB — INR: 1.9 (ref 0.8–1.2)

## 2024-05-21 NOTE — TELEPHONE ENCOUNTER
Faxed received from [facility]  PT/INR Self Testing Service      INR 1.9  test date 5-21-24     Current dose 8 mg daily     Routed to provider,see lab results for further details

## 2024-05-29 ENCOUNTER — TELEPHONE (OUTPATIENT)
Dept: FAMILY MEDICINE CLINIC | Facility: CLINIC | Age: 64
End: 2024-05-29

## 2024-05-29 LAB — INR: 2.2 (ref 0.8–1.2)

## 2024-05-29 NOTE — TELEPHONE ENCOUNTER
Faxed received from [facility]  PT/INR Self Testing Service      INR  2.2  test date 5-29-24     Current dose 8 mg daily     Routed to provider,see lab results for further details

## 2024-06-03 ENCOUNTER — TELEPHONE (OUTPATIENT)
Dept: FAMILY MEDICINE CLINIC | Facility: CLINIC | Age: 64
End: 2024-06-03

## 2024-06-03 NOTE — TELEPHONE ENCOUNTER
Received INR results from [facility]    INR - 2.2 on 5/29/24    Per Dr. Medrano - no change in treatment  To recheck in 1 week    Left message on patient's cell number - [phone]

## 2024-06-04 ENCOUNTER — TELEPHONE (OUTPATIENT)
Dept: FAMILY MEDICINE CLINIC | Facility: CLINIC | Age: 64
End: 2024-06-04

## 2024-06-04 LAB — INR: 2.1 (ref 0.8–1.2)

## 2024-06-04 NOTE — TELEPHONE ENCOUNTER
Dr. Medrano reviewed - no change in dosage  Check in 1 week    Spoke with patient -notified of Dr. Medrano's recommendations

## 2024-06-04 NOTE — TELEPHONE ENCOUNTER
Received fax with INR - from [facility] - INR    INR 2.1    Current dose is 8mg nightly

## 2024-06-05 ENCOUNTER — TELEPHONE (OUTPATIENT)
Dept: FAMILY MEDICINE CLINIC | Facility: CLINIC | Age: 64
End: 2024-06-05

## 2024-06-05 NOTE — TELEPHONE ENCOUNTER
Patient doing upper GI on 6-12-24. She has questions regarding her warfarin stopping before procedure 5 days prior to procedure.

## 2024-06-05 NOTE — TELEPHONE ENCOUNTER
Spoke with patient - given Dr. Medrano's recommendations:  she should stop 5 days prior and start again night of procedure the 1st week may be low after starting again     Patient verbalized understanding

## 2024-06-05 NOTE — TELEPHONE ENCOUNTER
Agree she should stop 5 days prior and start again night of procedure   the 1st week may be low after starting again

## 2024-06-11 ENCOUNTER — TELEPHONE (OUTPATIENT)
Dept: FAMILY MEDICINE CLINIC | Facility: CLINIC | Age: 64
End: 2024-06-11

## 2024-06-11 LAB — INR: 1.2 (ref 0.8–1.2)

## 2024-06-11 NOTE — TELEPHONE ENCOUNTER
Patient notified of Dr. Medrano's comments - This is appropriate as she is awaiting procedure   to resume medications the night of the procedure and check inr next week     Patient verbalized understanding

## 2024-06-18 ENCOUNTER — ANTI-COAG VISIT (OUTPATIENT)
Dept: FAMILY MEDICINE CLINIC | Facility: CLINIC | Age: 64
End: 2024-06-18

## 2024-06-18 ENCOUNTER — TELEPHONE (OUTPATIENT)
Dept: FAMILY MEDICINE CLINIC | Facility: CLINIC | Age: 64
End: 2024-06-18

## 2024-06-18 DIAGNOSIS — Z79.01 CHRONIC ANTICOAGULATION: Primary | ICD-10-CM

## 2024-06-18 LAB
INR: 1.5 (ref 0.8–1.2)
INR: 4.6 (ref 0.8–1.2)

## 2024-06-18 PROCEDURE — 85610 PROTHROMBIN TIME: CPT | Performed by: FAMILY MEDICINE

## 2024-06-18 PROCEDURE — 93793 ANTICOAG MGMT PT WARFARIN: CPT | Performed by: FAMILY MEDICINE

## 2024-06-18 NOTE — TELEPHONE ENCOUNTER
Spoke with patient who states she was off warfarin from 6-7-24 through 6-11-24, resumed at 8 mg on 6-12-24.  Medications reviewed with patient and not taking any new medications. Only diet change is that she has been eating more pickled beets. Not taking any over the counter medications.        Received INR results from [facility]     INR - 4.6 on 6/18/24     Result entered in Epic.     Routed to provider

## 2024-06-18 NOTE — TELEPHONE ENCOUNTER
Patient called with INR 4.6, she said that her INR was down to 1 on 6/12 she had to stop taking her pills for 5 days due to her upper GI. She stated that her INR has never been this number before and would like to speak with nurse.

## 2024-06-18 NOTE — TELEPHONE ENCOUNTER
Patient called and schedule nurse visit to come in today for INR note indicates she would like it checked due to different readings she is getting.    OK to check in office and send results.    Please advise    Future Appointments   Date Time Provider Department Center   6/18/2024  4:00 PM EMG ALEXANDRA NURSE [facility] [place]   6/27/2024 10:30 AM Phylicia Saba APRN [facility] [place]   8/29/2024 12:20 PM [facility] [place]

## 2024-06-18 NOTE — PROGRESS NOTES
Patient presents to office for INR. See telephone encounter from today. Patient usually does home monitoring but due to fluctuation of results came in to have checked in office.    Capillary puncture to Left 4th finger, INR of 1.5 obtained, bandage applied and patient left office in stable condition.    Per patient usual coumadin dose is 8 mg nightly.    Result forwarded to provider.

## 2024-06-24 ENCOUNTER — TELEPHONE (OUTPATIENT)
Dept: FAMILY MEDICINE CLINIC | Facility: CLINIC | Age: 64
End: 2024-06-24

## 2024-06-24 LAB — INR: 2.7 (ref 0.8–1.2)

## 2024-06-24 NOTE — TELEPHONE ENCOUNTER
Faxed received from [facility]  PT/INR Self Testing Service      INR  2.7  test date 6-24-24     Current dose 8 mg daily     Routed to provider,see lab results for further details
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
## 2024-06-27 ENCOUNTER — OFFICE VISIT (OUTPATIENT)
Dept: OBGYN CLINIC | Facility: CLINIC | Age: 64
End: 2024-06-27

## 2024-06-27 VITALS
DIASTOLIC BLOOD PRESSURE: 72 MMHG | WEIGHT: 256 LBS | HEART RATE: 70 BPM | HEIGHT: 65 IN | SYSTOLIC BLOOD PRESSURE: 132 MMHG | BODY MASS INDEX: 42.65 KG/M2

## 2024-06-27 DIAGNOSIS — Z01.419 WELL WOMAN EXAM WITH ROUTINE GYNECOLOGICAL EXAM: Primary | ICD-10-CM

## 2024-06-27 DIAGNOSIS — Z11.51 SCREENING FOR HUMAN PAPILLOMAVIRUS (HPV): ICD-10-CM

## 2024-06-27 DIAGNOSIS — Z12.4 SCREENING FOR CERVICAL CANCER: ICD-10-CM

## 2024-06-27 DIAGNOSIS — Z12.31 ENCOUNTER FOR SCREENING MAMMOGRAM FOR MALIGNANT NEOPLASM OF BREAST: ICD-10-CM

## 2024-06-27 PROCEDURE — G0101 CA SCREEN;PELVIC/BREAST EXAM: HCPCS | Performed by: NURSE PRACTITIONER

## 2024-06-27 PROCEDURE — 88175 CYTOPATH C/V AUTO FLUID REDO: CPT | Performed by: NURSE PRACTITIONER

## 2024-06-27 PROCEDURE — 87624 HPV HI-RISK TYP POOLED RSLT: CPT | Performed by: NURSE PRACTITIONER
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
## 2024-06-28 LAB — HPV E6+E7 MRNA CVX QL NAA+PROBE: NEGATIVE

## 2024-07-01 ENCOUNTER — MED REC SCAN ONLY (OUTPATIENT)
Dept: FAMILY MEDICINE CLINIC | Facility: CLINIC | Age: 64
End: 2024-07-01

## 2024-07-02 ENCOUNTER — TELEPHONE (OUTPATIENT)
Dept: FAMILY MEDICINE CLINIC | Facility: CLINIC | Age: 64
End: 2024-07-02

## 2024-07-02 DIAGNOSIS — F13.20 SEDATIVE, HYPNOTIC OR ANXIOLYTIC DEPENDENCE, UNCOMPLICATED (HCC): ICD-10-CM

## 2024-07-02 DIAGNOSIS — Z51.81 ANTICOAGULATION MANAGEMENT ENCOUNTER: ICD-10-CM

## 2024-07-02 DIAGNOSIS — Z79.01 ANTICOAGULATION MANAGEMENT ENCOUNTER: ICD-10-CM

## 2024-07-02 DIAGNOSIS — M17.0 PRIMARY OSTEOARTHRITIS OF BOTH KNEES: ICD-10-CM

## 2024-07-02 LAB
.: NORMAL
.: NORMAL
INR: 3.5 (ref 0.8–1.2)

## 2024-07-02 RX ORDER — HYDROCODONE BITARTRATE AND ACETAMINOPHEN 5; 325 MG/1; MG/1
1 TABLET ORAL EVERY 4 HOURS PRN
Qty: 30 TABLET | Refills: 0 | Status: SHIPPED | OUTPATIENT
Start: 2024-07-02 | End: 2024-08-01

## 2024-07-02 RX ORDER — ALPRAZOLAM 1 MG/1
0.5 TABLET ORAL 3 TIMES DAILY PRN
Qty: 45 TABLET | Refills: 1 | Status: SHIPPED | OUTPATIENT
Start: 2024-07-02

## 2024-07-02 RX ORDER — WARFARIN SODIUM 4 MG/1
TABLET ORAL
COMMUNITY
Start: 2024-07-02

## 2024-07-02 NOTE — TELEPHONE ENCOUNTER
Faxed received from [facility]  PT/INR Self Testing Service      INR  3.5  test date 7-2-24     Current dose 8 mg daily     Routed to provider,see lab results for further details

## 2024-07-02 NOTE — TELEPHONE ENCOUNTER
REFILL ALPRAZolam 1 MG Oral Tab & HYDROcodone-acetaminophen (NORCO) 5-325 MG Oral Tab TO [place] [facility]

## 2024-07-09 ENCOUNTER — ANTI-COAG VISIT (OUTPATIENT)
Dept: FAMILY MEDICINE CLINIC | Facility: CLINIC | Age: 64
End: 2024-07-09
Payer: MEDICARE

## 2024-07-09 ENCOUNTER — TELEPHONE (OUTPATIENT)
Dept: FAMILY MEDICINE CLINIC | Facility: CLINIC | Age: 64
End: 2024-07-09

## 2024-07-09 DIAGNOSIS — Z79.01 CHRONIC ANTICOAGULATION: Primary | ICD-10-CM

## 2024-07-09 LAB
INR: 1.9 (ref 0.8–1.2)
INR: 4.2 (ref 0.8–1.2)

## 2024-07-09 PROCEDURE — 85610 PROTHROMBIN TIME: CPT | Performed by: FAMILY MEDICINE

## 2024-07-09 NOTE — TELEPHONE ENCOUNTER
Faxed received from [facility]  PT/INR Self Testing Service      INR  4.2  test date 7-9-24     Current dose 4 mg Tuesday 8 mg Wed through Monday     Routed to provider,see lab results for further details

## 2024-07-09 NOTE — PROGRESS NOTES
Patient presents to office for INR check as she is concerned that her home INR machine is not accurate.    Capillary puncture to Right 3rd finger INR of 1.9 (pt had reading of 4.2 at home today). Patient brought in her machine which read 5.8 in the office.    Reviewed above with Dr Medrano and was advised to draw blood for send out protime.     Blood specimen obtained and sent to lab for testing.    Patient left office in stable condition.

## 2024-07-10 ENCOUNTER — TELEPHONE (OUTPATIENT)
Dept: FAMILY MEDICINE CLINIC | Facility: CLINIC | Age: 64
End: 2024-07-10

## 2024-07-10 LAB
INR BLD: 1.63 (ref 0.8–1.2)
PROTHROMBIN TIME: 19.5 SECONDS (ref 11.6–14.8)

## 2024-07-10 NOTE — TELEPHONE ENCOUNTER
See INR results from 7-9-24 that were done through [facility].      Reviewed with Raisa from [facility] that result is not accurate.

## 2024-07-17 ENCOUNTER — ANTI-COAG VISIT (OUTPATIENT)
Dept: FAMILY MEDICINE CLINIC | Facility: CLINIC | Age: 64
End: 2024-07-17
Payer: MEDICARE

## 2024-07-17 DIAGNOSIS — Z79.01 CHRONIC ANTICOAGULATION: Primary | ICD-10-CM

## 2024-07-17 LAB — INR: 2.3 (ref 0.8–1.2)

## 2024-07-17 PROCEDURE — 85610 PROTHROMBIN TIME: CPT | Performed by: FAMILY MEDICINE

## 2024-07-17 PROCEDURE — 93793 ANTICOAG MGMT PT WARFARIN: CPT | Performed by: FAMILY MEDICINE

## 2024-07-17 NOTE — PROGRESS NOTES
Patient in for INR. INR was 2.3. Patient currently takes 8 mg daily. Results sent to Dr. Medrano for review.

## 2024-07-23 ENCOUNTER — TELEPHONE (OUTPATIENT)
Dept: FAMILY MEDICINE CLINIC | Facility: CLINIC | Age: 64
End: 2024-07-23

## 2024-07-23 LAB
INR: 2.4 (ref 0.8–1.2)
INR: 6.9 (ref 0.8–1.2)

## 2024-07-23 NOTE — TELEPHONE ENCOUNTER
Call placed to patient who states she feels the level reported was inaccurate and the actual reading INR is 2.4.    Current dose of coumadin 8 mg daily.    Patient will check INR again in 2-3 days

## 2024-07-24 ENCOUNTER — OFFICE VISIT (OUTPATIENT)
Dept: FAMILY MEDICINE CLINIC | Facility: CLINIC | Age: 64
End: 2024-07-24
Payer: MEDICARE

## 2024-07-24 VITALS
OXYGEN SATURATION: 96 % | RESPIRATION RATE: 12 BRPM | SYSTOLIC BLOOD PRESSURE: 136 MMHG | TEMPERATURE: 97 F | HEIGHT: 65 IN | BODY MASS INDEX: 42.65 KG/M2 | WEIGHT: 256 LBS | HEART RATE: 83 BPM | DIASTOLIC BLOOD PRESSURE: 86 MMHG

## 2024-07-24 DIAGNOSIS — K25.3 ACUTE GASTRIC ULCER WITHOUT HEMORRHAGE OR PERFORATION: ICD-10-CM

## 2024-07-24 DIAGNOSIS — Z79.01 CHRONIC ANTICOAGULATION: Primary | ICD-10-CM

## 2024-07-24 DIAGNOSIS — L82.1 SK (SEBORRHEIC KERATOSIS): ICD-10-CM

## 2024-07-24 LAB — INR: 2 (ref 0.8–1.2)

## 2024-07-24 PROCEDURE — 85610 PROTHROMBIN TIME: CPT | Performed by: FAMILY MEDICINE

## 2024-07-24 PROCEDURE — 99214 OFFICE O/P EST MOD 30 MIN: CPT | Performed by: FAMILY MEDICINE

## 2024-07-24 RX ORDER — PREDNISONE 5 MG/1
5 TABLET ORAL DAILY
COMMUNITY

## 2024-07-24 NOTE — PROGRESS NOTES
Subjective:   Tila Cassidy is a 63 year old female who presents for Test Results (DISCUSS AT HOME INR RESULT WITH NEW INR MACHINE )     Here for evaluation  Has issues home patient inr  Too very different numbers in minutes    Reproduced here also    She has no excess bleeding    Choose to follow the therapeutic number of our machine    Patient will return home and see about manual and calibration technique with controls to see if she can understand better    Also has a skin lesion of the back and will see derm    [Appears seborrheic keratoses ]    Also  Has seen gi  And last egd healed ulcer stomach  No biopsy    Path prior non malignant and h pylori only    Also  Discussed with patient no new egd in near future to stabilize the inr [no stoppage or interruption]    Does not apear to have indication for new egd at present    History/Other:   has a current medication list which includes the following prescription(s): prednisone, alprazolam, hydrocodone-acetaminophen, warfarin, budesonide-formoterol fumarate, hydroxychloroquine, atenolol, benazepril hcl, phentermine hcl, ipratropium-albuterol, albuterol, mupirocin, mometasone furoate, pantoprazole, cefprozil, prednisone, warfarin, warfarin, prednisone, folic acid, and cholecalciferol, and the following Facility-Administered Medications: ipratropium-albuterol.     is allergic to magnesium, potassium chloride, augmentin [amoxicillin-pot clavulanate], ciprofloxacin, ibuprofen, nsaids, sulfa antibiotics, and clindamycin.     Review of Systems:  GENERAL HEALTH: feels well no complaints  SKIN: see HPI   No bruising or bleeding new   RESPIRATORY: denies shortness of breath with exertion  CARDIOVASCULAR: denies chest pain on exertion  GI: denies abdominal pain and denies heartburn  No issues   HEME  see HPI   NEURO: denies headaches    Objective:   /86 (BP Location: Left arm, Patient Position: Sitting, Cuff Size: adult)   Pulse 83   Temp 96.7 °F (35.9 °C) (Temporal)    Resp 12   Ht 5' 5\" (1.651 m)   Wt 256 lb (116.1 kg)   SpO2 96%   BMI 42.60 kg/m²  Estimated body mass index is 42.6 kg/m² as calculated from the following:    Height as of this encounter: 5' 5\" (1.651 m).    Weight as of this encounter: 256 lb (116.1 kg).  GENERAL: well developed, well nourished,in no apparent distress  SKIN: raised oval 2 cm scale brown lesion low right back    EXTREMITIES: no cyanosis, clubbing or edema    Aged bruises only      Assessment & Plan:   1. Chronic anticoagulation-coumadin (Primary)  Comments:  calibrate home machine  no chg meds  Orders:  -     PT/INR (Prothrombin time)  2. SK (seborrheic keratosis)  Comments:  to see derm  3. Acute gastric ulcer without hemorrhage or perforation  Comments:  now resolved post egd confirmation          Sandip Medrano MD, 7/24/2024, 12:59 PM

## 2024-07-29 DIAGNOSIS — E66.01 CLASS 3 SEVERE OBESITY DUE TO EXCESS CALORIES WITH SERIOUS COMORBIDITY AND BODY MASS INDEX (BMI) OF 40.0 TO 44.9 IN ADULT (HCC): ICD-10-CM

## 2024-07-29 DIAGNOSIS — M17.0 PRIMARY OSTEOARTHRITIS OF BOTH KNEES: ICD-10-CM

## 2024-07-29 RX ORDER — PHENTERMINE HYDROCHLORIDE 37.5 MG/1
37.5 TABLET ORAL
Qty: 30 TABLET | Refills: 0 | Status: SHIPPED | OUTPATIENT
Start: 2024-07-29

## 2024-07-29 NOTE — TELEPHONE ENCOUNTER
LOV: 02/29/2024     Future Appointments   Date Time Provider Department Center   8/29/2024 12:20 PM Kris Salinas MD [facility] [place]   9/5/2024 10:40 AM Sandip Medrano MD [facility] [place]       LF:  02/29/2024    QTY:   30     Refills:   3

## 2024-07-30 ENCOUNTER — TELEPHONE (OUTPATIENT)
Dept: FAMILY MEDICINE CLINIC | Facility: CLINIC | Age: 64
End: 2024-07-30

## 2024-07-30 LAB — INR: 2.4 (ref 0.8–1.2)

## 2024-07-30 NOTE — TELEPHONE ENCOUNTER
Spoke with patient who reports she has now figured out that it is a testing strip issue, she was using the strips from her previous machine with the new machine and after contacting company was told they are interchangeable.   Per patient she did test again with appropriate strips and level was 2.4. Awaiting that result to be faxed.

## 2024-07-30 NOTE — TELEPHONE ENCOUNTER
Faxed received from [facility]  PT/INR Self Testing Service      INR 2.4  test date 7-30-24     Current dose 8 mg daily    Routed to provider,see lab results for further details

## 2024-07-30 NOTE — TELEPHONE ENCOUNTER
JENS FROM [facility] IN[facility] CALLING TO REPORT CRITICAL INR- 7.8 REPORTED TODAY AT 10:15 AND 10:17. POSSIBLE DUPLICATE COPY FAXED.

## 2024-08-03 DIAGNOSIS — I10 ESSENTIAL HYPERTENSION: ICD-10-CM

## 2024-08-05 ENCOUNTER — MED REC SCAN ONLY (OUTPATIENT)
Dept: FAMILY MEDICINE CLINIC | Facility: CLINIC | Age: 64
End: 2024-08-05

## 2024-08-05 RX ORDER — ATENOLOL 25 MG/1
25 TABLET ORAL DAILY
Qty: 90 TABLET | Refills: 0 | Status: SHIPPED | OUTPATIENT
Start: 2024-08-05

## 2024-08-05 NOTE — TELEPHONE ENCOUNTER
Last refill: 05/01/24  qtY: 90  W/ 0 refills  Last ov: 07/24/24    Requested Prescriptions     Pending Prescriptions Disp Refills    ATENOLOL 25 MG Oral Tab [Pharmacy Med Name: ATENOLOL 25MG TABLETS] 90 tablet 0     Sig: TAKE 1 TABLET(25 MG) BY MOUTH DAILY     Future Appointments   Date Time Provider Department Center   8/29/2024 12:20 PM Kris Salinas MD [facility] [place]   9/5/2024 10:40 AM Sandip Medrano MD [facility] [place]

## 2024-08-06 ENCOUNTER — TELEPHONE (OUTPATIENT)
Dept: FAMILY MEDICINE CLINIC | Facility: CLINIC | Age: 64
End: 2024-08-06

## 2024-08-06 LAB — INR: 2.9 (ref 0.8–1.2)

## 2024-08-06 NOTE — TELEPHONE ENCOUNTER
Faxed received from [facility]  PT/INR Self Testing Service      INR  2.9 test date 8-6-24     Current dose of coumadin 8 mg daily     Routed to provider,see lab results for further details

## 2024-08-13 ENCOUNTER — TELEPHONE (OUTPATIENT)
Dept: FAMILY MEDICINE CLINIC | Facility: CLINIC | Age: 64
End: 2024-08-13

## 2024-08-13 LAB — INR: 2.4 (ref 0.8–1.2)

## 2024-08-13 NOTE — TELEPHONE ENCOUNTER
Faxed received from [facility]  PT/INR Self Testing Service      INR  2.4 test date 8-13-24     Current dose of coumadin 8 mg daily     Routed to provider,see lab results for further details

## 2024-08-20 LAB — INR: 2.6 (ref 0.8–1.2)

## 2024-08-22 ENCOUNTER — TELEPHONE (OUTPATIENT)
Dept: FAMILY MEDICINE CLINIC | Facility: CLINIC | Age: 64
End: 2024-08-22

## 2024-08-22 NOTE — TELEPHONE ENCOUNTER
Dr Medrano carefully reviewed fax from [facility] PT/INR self testing service and documented:    Normal no change check one week.    Patient notified of above and verbalized understanding.    Copy of result to scan.

## 2024-08-22 NOTE — TELEPHONE ENCOUNTER
Faxed received from [facility]  PT/INR Self Testing Service      INR  2.6 test date 8-20-24     Current dose of coumadin 8 mg daily     Routed to provider,see lab results for further details

## 2024-08-26 DIAGNOSIS — F13.20 SEDATIVE, HYPNOTIC OR ANXIOLYTIC DEPENDENCE, UNCOMPLICATED (HCC): ICD-10-CM

## 2024-08-26 DIAGNOSIS — Z79.01 ENCOUNTER FOR CURRENT LONG-TERM USE OF ANTICOAGULANTS: ICD-10-CM

## 2024-08-26 DIAGNOSIS — Z79.01 ANTICOAGULATION MANAGEMENT ENCOUNTER: ICD-10-CM

## 2024-08-26 DIAGNOSIS — Z51.81 ANTICOAGULATION MANAGEMENT ENCOUNTER: ICD-10-CM

## 2024-08-26 RX ORDER — WARFARIN SODIUM 1 MG/1
TABLET ORAL
Qty: 90 TABLET | Refills: 0 | Status: SHIPPED | OUTPATIENT
Start: 2024-08-26

## 2024-08-26 RX ORDER — WARFARIN SODIUM 4 MG/1
TABLET ORAL
Qty: 90 TABLET | Refills: 0 | Status: SHIPPED | OUTPATIENT
Start: 2024-08-26

## 2024-08-26 RX ORDER — ALPRAZOLAM 1 MG
0.5 TABLET ORAL 3 TIMES DAILY PRN
Qty: 45 TABLET | Refills: 1 | Status: SHIPPED | OUTPATIENT
Start: 2024-08-26

## 2024-08-26 NOTE — TELEPHONE ENCOUNTER
Patient request for refills on hydrocodone, lorazepam, warfarin 4 mg and 1 mg      LOV  7-24-24    LAST LAB  2-8-24    LAST RX  7-2-24 hydrocodone-acetaminophen 5-325  #30                   6-14-24 warfarin 4 mg 6-14-24  #60                    5-10-11 warfarin 1 mg  #45  Next OV    Future Appointments   Date Time Provider Department Center   8/29/2024 12:20 PM [facility] [place]   9/5/2024 10:40 AM Sandip Medrano MD [facility] [place]         PROTOCOL      Reviewed with patient she is requesting alprazolam and NOT lorazepam, per patient would like 1 mg coumadin tabs to keep on hand (uses with 5 mg when needed) and states she has not filled in awhile.

## 2024-08-26 NOTE — TELEPHONE ENCOUNTER
Hydrocodone, lorazepam, warfarin 4mg and warfarin 1mg     [facility] in [place]    she wants all of these to be refilled the same day

## 2024-08-27 ENCOUNTER — TELEPHONE (OUTPATIENT)
Dept: FAMILY MEDICINE CLINIC | Facility: CLINIC | Age: 64
End: 2024-08-27

## 2024-08-27 LAB — INR: 2 (ref 0.8–1.2)

## 2024-08-27 NOTE — TELEPHONE ENCOUNTER
Faxed received from [facility]  PT/INR Self Testing Service      INR  2.0 test date 8-27-24     Current dose of coumadin 8 mg daily     Routed to provider,see lab results for further details

## 2024-08-28 DIAGNOSIS — Z79.01 ENCOUNTER FOR CURRENT LONG-TERM USE OF ANTICOAGULANTS: ICD-10-CM

## 2024-08-28 RX ORDER — WARFARIN SODIUM 1 MG/1
TABLET ORAL
Qty: 90 TABLET | Refills: 0 | OUTPATIENT
Start: 2024-08-28

## 2024-08-28 NOTE — TELEPHONE ENCOUNTER
Last refill: 08/26/24  qtY: 90  W/ 0 refills  Last ov 07/24/24    Requested Prescriptions     Pending Prescriptions Disp Refills    warfarin 1 MG Oral Tab [Pharmacy Med Name: WARFARIN SOD 1MG TABLETS (PINK)] 90 tablet 0     Sig: TAKE 3 TABLETS BY MOUTH DAILY ALONG WITH ONE 5 MG TABLET TO EQUAL 8 MG DAILY     Future Appointments   Date Time Provider Department Center   8/29/2024 12:20 PM Kris Salinas MD [facility] [place]   9/5/2024 10:40 AM Sandip Medrano MD [facility] [place]

## 2024-08-29 ENCOUNTER — OFFICE VISIT (OUTPATIENT)
Dept: RHEUMATOLOGY | Facility: CLINIC | Age: 64
End: 2024-08-29
Payer: MEDICARE

## 2024-08-29 VITALS
BODY MASS INDEX: 42.32 KG/M2 | DIASTOLIC BLOOD PRESSURE: 68 MMHG | HEIGHT: 65 IN | TEMPERATURE: 97 F | SYSTOLIC BLOOD PRESSURE: 112 MMHG | RESPIRATION RATE: 16 BRPM | OXYGEN SATURATION: 96 % | WEIGHT: 254 LBS | HEART RATE: 78 BPM

## 2024-08-29 DIAGNOSIS — M17.0 PRIMARY OSTEOARTHRITIS OF BOTH KNEES: ICD-10-CM

## 2024-08-29 DIAGNOSIS — E66.9 OBESITY (BMI 35.0-39.9 WITHOUT COMORBIDITY): Primary | ICD-10-CM

## 2024-08-29 DIAGNOSIS — Z79.01 CHRONIC ANTICOAGULATION: ICD-10-CM

## 2024-08-29 DIAGNOSIS — M32.9 SYSTEMIC LUPUS ERYTHEMATOSUS, UNSPECIFIED SLE TYPE, UNSPECIFIED ORGAN INVOLVEMENT STATUS (HCC): ICD-10-CM

## 2024-08-29 DIAGNOSIS — Z86.711 HISTORY OF PULMONARY EMBOLUS (PE): ICD-10-CM

## 2024-08-29 DIAGNOSIS — E66.01 CLASS 3 SEVERE OBESITY DUE TO EXCESS CALORIES WITH SERIOUS COMORBIDITY AND BODY MASS INDEX (BMI) OF 40.0 TO 44.9 IN ADULT (HCC): ICD-10-CM

## 2024-08-29 PROCEDURE — 99214 OFFICE O/P EST MOD 30 MIN: CPT | Performed by: INTERNAL MEDICINE

## 2024-08-29 RX ORDER — PHENTERMINE HYDROCHLORIDE 30 MG/1
30 CAPSULE ORAL EVERY MORNING
Qty: 30 CAPSULE | Refills: 2 | Status: SHIPPED | OUTPATIENT
Start: 2024-08-29

## 2024-08-29 RX ORDER — HYDROXYCHLOROQUINE SULFATE 200 MG/1
TABLET, FILM COATED ORAL
Qty: 180 TABLET | Refills: 2 | Status: SHIPPED | OUTPATIENT
Start: 2024-08-29

## 2024-08-29 NOTE — PROGRESS NOTES
[facility]  Dr. Salinas Progress Note     Subjective:   Tila Cassidy is a(n) 63 year old female.   Current complaints:   Chief Complaint   Patient presents with    Follow - Up     LOV 2/29/2024  Rapid score 3.3     History of lupus. History of PE  2012.  On coumadin. 8 mg per day.    Taking Plaquenil 200 mg twice a day.  Uses Norco at times for pain.  On prednisone generally 5 mg/day.  Occasonally 10 mg if wheezing.  Granddaughter age 11, home schooled.  Occasional dizziness.  Has to be careful standing up.  Joint pain wrists, elbows, shoulders, knees. Right ankle.  Has small tiny nodules in left palm.  On pantoprozole for GERD.  Sees. Dr. Mast of [facility].  Colonoscopy due next year.    Objective:   /68   Pulse 78   Temp 97.4 °F (36.3 °C)   Resp 16   Ht 5' 5\" (1.651 m)   Wt 254 lb (115.2 kg)   SpO2 96%   BMI 42.27 kg/m²   Lungs clear  Heart nsr  Abd obese soft  Knees trace tender.  Wrists trace tender    No skin rashes.  8/20/2024 INR 2.6.  8/13/2024 lab in [facility], INR 2.4.  11/26/2021 SIRIA screen negative.  Done at [facility].  Assessment:     Encounter Diagnoses   Name Primary?    Systemic lupus erythematosus, unspecified SLE type, unspecified organ involvement status (HCC)     Obesity (BMI 35.0-39.9 without comorbidity) Yes   Lupus stable.   Plan:     Patient Instructions   Continue Plaquenil 200 mg twice a day.  Use phenterimine 30 mg per day to help with weight loss and diet control.  Low fat low calorie diet.  Exercise as best you can .  Eye exam yearly on Plaquenil,  Check Lupus labs .  Return to office 6 months.          Kris Salinas MD 8/29/2024 12:14 PM

## 2024-08-29 NOTE — PATIENT INSTRUCTIONS
Continue Plaquenil 200 mg twice a day.  Use phenterimine 30 mg per day to help with weight loss and diet control.  Low fat low calorie diet.  Exercise as best you can .  Eye exam yearly on Plaquenil,  Check Lupus labs .  Return to office 6 months.

## 2024-09-03 ENCOUNTER — TELEPHONE (OUTPATIENT)
Dept: FAMILY MEDICINE CLINIC | Facility: CLINIC | Age: 64
End: 2024-09-03

## 2024-09-03 LAB — INR: 4.1 (ref 0.8–1.2)

## 2024-09-03 NOTE — TELEPHONE ENCOUNTER
Faxed received from [facility]  PT/INR Self Testing Service      INR 4.1 test date 9-3-24     Current dose of coumadin 8 mg daily     Routed to provider,see lab results for further details

## 2024-09-03 NOTE — TELEPHONE ENCOUNTER
See result notes:    Sandip Medrano MD  9/3/2024  3:44 PM CDT Back to Top      Too high  Decrease from 8 mg a day  To  8 mg mon wed fri sun [4 of 7 days] and 6 mg tu thu sat [3 of 7 days]  Check one week     Patient notified of above.

## 2024-09-04 DIAGNOSIS — M17.0 PRIMARY OSTEOARTHRITIS OF BOTH KNEES: ICD-10-CM

## 2024-09-04 RX ORDER — HYDROCODONE BITARTRATE AND ACETAMINOPHEN 5; 325 MG/1; MG/1
1 TABLET ORAL EVERY 4 HOURS PRN
Qty: 30 TABLET | Refills: 0 | Status: SHIPPED | OUTPATIENT
Start: 2024-09-04 | End: 2024-09-05

## 2024-09-04 NOTE — TELEPHONE ENCOUNTER
Last office visit: 7/24/24  Last refill: 7/2/24 qty: 30  Future Appointments   Date Time Provider Department Center   2/27/2025 10:00 AM Kris Salinas MD [facility] [place]

## 2024-09-05 ENCOUNTER — TELEPHONE (OUTPATIENT)
Dept: FAMILY MEDICINE CLINIC | Facility: CLINIC | Age: 64
End: 2024-09-05

## 2024-09-05 DIAGNOSIS — M17.0 PRIMARY OSTEOARTHRITIS OF BOTH KNEES: ICD-10-CM

## 2024-09-05 RX ORDER — HYDROCODONE BITARTRATE AND ACETAMINOPHEN 5; 325 MG/1; MG/1
1 TABLET ORAL EVERY 4 HOURS PRN
Qty: 30 TABLET | Refills: 0 | Status: SHIPPED | OUTPATIENT
Start: 2024-09-05 | End: 2024-10-05

## 2024-09-05 NOTE — TELEPHONE ENCOUNTER
See intake message     Call placed to pharmacy and spoke with Juju who states when prescription for Norco was received yesterday was closed with message refill too soon sent to another store but could tell me what store or why it was sent.      Spoke with patient and did not request prescription be sent to another location and has not picked up prescription since 7-2-24.

## 2024-09-05 NOTE — TELEPHONE ENCOUNTER
Pt calling about issue with picking up HYDROcodone-acetaminophen (NORCO), was it sent to wrong pharmacy, she said she never picked it up, call her back

## 2024-09-05 NOTE — TELEPHONE ENCOUNTER
Spoke with Chasidy at [facility] and verified prescription was received she advises should be ready this afternoon.    Patient notified of above and agrees to contact office if there are any further issues.

## 2024-09-10 ENCOUNTER — TELEPHONE (OUTPATIENT)
Dept: FAMILY MEDICINE CLINIC | Facility: CLINIC | Age: 64
End: 2024-09-10

## 2024-09-10 LAB — INR: 3.7 (ref 0.8–1.2)

## 2024-09-10 NOTE — TELEPHONE ENCOUNTER
Faxed received from [facility]  PT/INR Self Testing Service      INR 3.7  test date 9-10-24     Current dose of coumadin 8 mg  M, W,F,Zelaya  6 mg  T,Th,Sat     Routed to provider,see lab results for further details

## 2024-09-16 RX ORDER — BENAZEPRIL HYDROCHLORIDE 20 MG/1
20 TABLET ORAL DAILY
Qty: 90 TABLET | Refills: 1 | Status: SHIPPED | OUTPATIENT
Start: 2024-09-16

## 2024-09-16 NOTE — TELEPHONE ENCOUNTER
OV 07/24/24  LABS 02/24    REFILL 03/08/24 #90 +1 RF    Future Appointments   Date Time Provider Department Center   9/17/2024  1:40 PM Sandip Medrano MD [facility] [place]   2/27/2025 10:00 AM Kris Salinas MD [facility] [place]

## 2024-09-17 ENCOUNTER — TELEPHONE (OUTPATIENT)
Dept: FAMILY MEDICINE CLINIC | Facility: CLINIC | Age: 64
End: 2024-09-17

## 2024-09-17 ENCOUNTER — LABORATORY ENCOUNTER (OUTPATIENT)
Dept: LAB | Age: 64
End: 2024-09-17
Attending: FAMILY MEDICINE
Payer: MEDICARE

## 2024-09-17 ENCOUNTER — OFFICE VISIT (OUTPATIENT)
Dept: FAMILY MEDICINE CLINIC | Facility: CLINIC | Age: 64
End: 2024-09-17
Payer: MEDICARE

## 2024-09-17 VITALS
SYSTOLIC BLOOD PRESSURE: 130 MMHG | BODY MASS INDEX: 38.99 KG/M2 | TEMPERATURE: 98 F | DIASTOLIC BLOOD PRESSURE: 74 MMHG | HEART RATE: 87 BPM | OXYGEN SATURATION: 96 % | HEIGHT: 68 IN | WEIGHT: 257.25 LBS | RESPIRATION RATE: 12 BRPM

## 2024-09-17 DIAGNOSIS — N18.31 STAGE 3A CHRONIC KIDNEY DISEASE (CKD) (HCC): ICD-10-CM

## 2024-09-17 DIAGNOSIS — M32.13 OTHER SYSTEMIC LUPUS ERYTHEMATOSUS WITH LUNG INVOLVEMENT (HCC): ICD-10-CM

## 2024-09-17 DIAGNOSIS — I10 ESSENTIAL HYPERTENSION: ICD-10-CM

## 2024-09-17 DIAGNOSIS — Z00.00 ENCOUNTER FOR MEDICARE ANNUAL WELLNESS EXAM: Primary | ICD-10-CM

## 2024-09-17 DIAGNOSIS — Z13.1 SCREENING FOR DIABETES MELLITUS: ICD-10-CM

## 2024-09-17 DIAGNOSIS — E78.5 DYSLIPIDEMIA: ICD-10-CM

## 2024-09-17 DIAGNOSIS — J44.9 CHRONIC OBSTRUCTIVE PULMONARY DISEASE, UNSPECIFIED COPD TYPE (HCC): ICD-10-CM

## 2024-09-17 DIAGNOSIS — M79.7 FIBROMYALGIA: ICD-10-CM

## 2024-09-17 DIAGNOSIS — M32.9 SYSTEMIC LUPUS ERYTHEMATOSUS, UNSPECIFIED SLE TYPE, UNSPECIFIED ORGAN INVOLVEMENT STATUS (HCC): ICD-10-CM

## 2024-09-17 DIAGNOSIS — R73.9 HYPERGLYCEMIA: ICD-10-CM

## 2024-09-17 DIAGNOSIS — E66.01 CLASS 2 SEVERE OBESITY DUE TO EXCESS CALORIES WITH SERIOUS COMORBIDITY AND BODY MASS INDEX (BMI) OF 39.0 TO 39.9 IN ADULT (HCC): ICD-10-CM

## 2024-09-17 DIAGNOSIS — Z79.899 ENCOUNTER FOR LONG-TERM (CURRENT) USE OF MEDICATIONS: ICD-10-CM

## 2024-09-17 DIAGNOSIS — M17.0 PRIMARY OSTEOARTHRITIS OF BOTH KNEES: ICD-10-CM

## 2024-09-17 DIAGNOSIS — Z79.01 CHRONIC ANTICOAGULATION: ICD-10-CM

## 2024-09-17 DIAGNOSIS — Z79.01 ENCOUNTER FOR CURRENT LONG-TERM USE OF ANTICOAGULANTS: ICD-10-CM

## 2024-09-17 DIAGNOSIS — Z12.31 VISIT FOR SCREENING MAMMOGRAM: ICD-10-CM

## 2024-09-17 DIAGNOSIS — I10 PRIMARY HYPERTENSION: ICD-10-CM

## 2024-09-17 PROBLEM — Z86.711 HISTORY OF PULMONARY EMBOLUS (PE): Status: RESOLVED | Noted: 2020-01-15 | Resolved: 2024-09-17

## 2024-09-17 LAB
ALBUMIN SERPL-MCNC: 4.1 G/DL (ref 3.2–4.8)
ALBUMIN/GLOB SERPL: 1.5 {RATIO} (ref 1–2)
ALP LIVER SERPL-CCNC: 91 U/L
ALT SERPL-CCNC: 14 U/L
ANION GAP SERPL CALC-SCNC: 6 MMOL/L (ref 0–18)
AST SERPL-CCNC: 16 U/L (ref ?–34)
BILIRUB SERPL-MCNC: 0.3 MG/DL (ref 0.2–1.1)
BUN BLD-MCNC: 15 MG/DL (ref 9–23)
CALCIUM BLD-MCNC: 9.5 MG/DL (ref 8.7–10.4)
CHLORIDE SERPL-SCNC: 108 MMOL/L (ref 98–112)
CHOLEST SERPL-MCNC: 177 MG/DL (ref ?–200)
CO2 SERPL-SCNC: 25 MMOL/L (ref 21–32)
CREAT BLD-MCNC: 1.26 MG/DL
CRP SERPL-MCNC: 0.8 MG/DL (ref ?–0.5)
EGFRCR SERPLBLD CKD-EPI 2021: 48 ML/MIN/1.73M2 (ref 60–?)
ERYTHROCYTE [SEDIMENTATION RATE] IN BLOOD: 13 MM/HR
FASTING PATIENT LIPID ANSWER: NO
FASTING STATUS PATIENT QL REPORTED: NO
GLOBULIN PLAS-MCNC: 2.7 G/DL (ref 2–3.5)
GLUCOSE BLD-MCNC: 97 MG/DL (ref 70–99)
HDLC SERPL-MCNC: 67 MG/DL (ref 40–59)
INR: 1.2 (ref 0.8–1.3)
INR: 3.9 (ref 0.8–1.2)
LDLC SERPL CALC-MCNC: 87 MG/DL (ref ?–100)
NONHDLC SERPL-MCNC: 110 MG/DL (ref ?–130)
OSMOLALITY SERPL CALC.SUM OF ELEC: 289 MOSM/KG (ref 275–295)
POTASSIUM SERPL-SCNC: 4.2 MMOL/L (ref 3.5–5.1)
PROT SERPL-MCNC: 6.8 G/DL (ref 5.7–8.2)
SODIUM SERPL-SCNC: 139 MMOL/L (ref 136–145)
TRIGL SERPL-MCNC: 135 MG/DL (ref 30–149)
VLDLC SERPL CALC-MCNC: 22 MG/DL (ref 0–30)

## 2024-09-17 PROCEDURE — 99499 UNLISTED E&M SERVICE: CPT | Performed by: FAMILY MEDICINE

## 2024-09-17 PROCEDURE — G0439 PPPS, SUBSEQ VISIT: HCPCS | Performed by: FAMILY MEDICINE

## 2024-09-17 PROCEDURE — 85652 RBC SED RATE AUTOMATED: CPT

## 2024-09-17 PROCEDURE — 36415 COLL VENOUS BLD VENIPUNCTURE: CPT

## 2024-09-17 PROCEDURE — 99214 OFFICE O/P EST MOD 30 MIN: CPT | Performed by: FAMILY MEDICINE

## 2024-09-17 PROCEDURE — 86038 ANTINUCLEAR ANTIBODIES: CPT

## 2024-09-17 PROCEDURE — 86225 DNA ANTIBODY NATIVE: CPT

## 2024-09-17 PROCEDURE — 80053 COMPREHEN METABOLIC PANEL: CPT

## 2024-09-17 PROCEDURE — 86140 C-REACTIVE PROTEIN: CPT

## 2024-09-17 PROCEDURE — 80061 LIPID PANEL: CPT

## 2024-09-17 NOTE — PROGRESS NOTES
Subjective:   Tila Cassidy is a 63 year old female who presents for a Medicare Subsequent Annual Wellness visit (Pt already had Initial Annual Wellness) and scheduled follow up of multiple significant but stable problems.     BP shows good control with last BP of 130/74. Continue lifestyle changes, diet, exercise and weight loss.   9/17/2024: Potassium 4.2; Creatinine 1.26 (H); eGFR-Cr 48 (L)  BP Meds: atenolol Tabs - 25 MG; Benazepril HCl Tabs - 20 MG   ACE/ARB Adherence Good at 92%     Cholesterol shows Excellent control and not current on medications  . Long term heart-healthy diet and lifestyle discussed and encouraged to reduce risk of cardiovascular disease.  9/17/2024: Cholesterol, Total 177; HDL Cholesterol 67 (H); Triglycerides 135; LDL Cholesterol 87  No current Cholesterol medication. stable  Continue with current treatment plan      Also needs further follow up on the inr    It was listed as too hi and we keep cutting dose    But here for assess and home reading not considered correct    In fact, office visit reading is very low  so we will ignore home read as invalid and add warfarin    See charting    Last Glomerular Filtration Rate: CKD 3a (GFR 48). .   9/17/2024: Creatinine 1.26 (H) CKD etiologies : Hypertension and Autoimmune disease  Plan/Management: Control Hypertension/Diabetes        History/Other:   Fall Risk Assessment:   She has been screened for Falls and is High Risk. Fall Prevention information provided to patient in After Visit Summary.    Do you feel unsteady when standing or walking?: Yes  Do you worry about falling?: Yes  Have you fallen in the past year?: Yes  How many times have you fallen?: 1  Were you injured?: Yes     Cognitive Assessment:   She had a completely normal cognitive assessment - see flowsheet entries     Functional Ability/Status:   Tila Cassidy has a completely normal functional assessment. See flowsheet for details.        Depression Screening (PHQ):  PHQ-2 SCORE:  0  , done 9/17/2024             Advanced Directives:   She does NOT have a Living Will. [Do you have a living will?: No]  She does NOT have a Power of  for Health Care. [Do you have a healthcare power of ?: No]  Discussed Advance Care Planning with patient (and family/surrogate if present). Standard forms made available to patient in After Visit Summary.      Patient Active Problem List   Diagnosis    HTN (hypertension)    COPD (chronic obstructive pulmonary disease) ([facility])    SLE (systemic lupus erythematosus) ([facility])    Hypertensive kidney disease with CKD (chronic kidney disease)    Primary osteoarthritis of both knees    Fibromyalgia    Class 2 severe obesity due to excess calories with serious comorbidity and body mass index (BMI) of 39.0 to 39.9 in adult ([facility])    Chronic anticoagulation-coumadin    Pulmonary nodule 1 cm or greater in diameter    Dyslipidemia    CKD stage 3a, GFR 45-59 ml/min ([facility])     Allergies:  She is allergic to magnesium, potassium chloride, augmentin [amoxicillin-pot clavulanate], ciprofloxacin, ibuprofen, nsaids, sulfa antibiotics, and clindamycin.    Current Medications:  Outpatient Medications Marked as Taking for the 9/17/24 encounter (Office Visit) with Sandip Medrano MD   Medication Sig    BENAZEPRIL HCL 20 MG Oral Tab TAKE 1 TABLET(20 MG) BY MOUTH DAILY    HYDROcodone-acetaminophen (NORCO) 5-325 MG Oral Tab Take 1 tablet by mouth every 4 (four) hours as needed for Pain.    hydroxychloroquine 200 MG Oral Tab TAKE 2 TABLETS BY MOUTH EVERY DAY    Phentermine HCl 30 MG Oral Cap Take 1 capsule (30 mg total) by mouth every morning.    ALPRAZolam 1 MG Oral Tab Take 0.5 tablets (0.5 mg total) by mouth 3 (three) times daily as needed for Anxiety or Sleep.    warfarin 4 MG Oral Tab TAKE 2 TABLETS (8mg) BY MOUTH EVERY DAY    ATENOLOL 25 MG Oral Tab TAKE 1 TABLET(25 MG) BY MOUTH DAILY    pantoprazole 40 MG Oral Tab EC Take 1 tablet (40 mg total) by mouth 2 (two) times daily.     Budesonide-Formoterol Fumarate 160-4.5 MCG/ACT Inhalation Aerosol Inhale 2 puffs into the lungs 2 (two) times daily.    predniSONE 10 MG Oral Tab Take 1 tablet (10 mg total) by mouth daily.    albuterol 108 (90 Base) MCG/ACT Inhalation Aero Soln Inhale 2 puffs into the lungs every 6 (six) hours as needed for Wheezing or Shortness of Breath.    mupirocin 2 % External Ointment Apply topically 3 (three) times daily.    Mometasone Furoate 0.1 % External Cream Apply 1 Application topically 2 (two) times daily as needed.     Current Facility-Administered Medications for the 9/17/24 encounter (Office Visit) with Sandip Medrano MD   Medication    ipratropium-albuterol (Duoneb) 0.5-2.5 (3) MG/3ML inhalation solution 3 mL       Medical History:  She  has a past medical history of Adenomyomatosis of gallbladder (11/01/2018), Adrenal insufficiency ([facility]), Anxiety state, Chronic low back pain, COPD (chronic obstructive pulmonary disease) ([facility]), Fibromyalgia (01/15/2020), Gastric ulcer (2024), Hearing impairment, History of cervical dysplasia (01/19/2017), History of colon polyps (04/09/2018), History of pulmonary embolus (PE), HTN (hypertension), JOHN (obstructive sleep apnea), Positive cardiolipin antibodies, Primary osteoarthritis of both hips (11/13/2014), Primary osteoarthritis of both knees (01/15/2020), SLE (systemic lupus erythematosus) ([facility]), Spondylosis of lumbar region without myelopathy or radiculopathy (01/15/2020), Tubular adenoma of colon (01/19/2017), Visual impairment, and Warfarin anticoagulation (07/19/2017).  Surgical History:  She  has a past surgical history that includes tubal ligation; cholecystectomy; yonny biopsy stereo nodule 1 site right (cpt=19081); yonny localization wire 1 site right (cpt=19281); and biopsy (2024).   Family History:  Her family history includes Cancer in her mother and paternal grandmother; Heart Disorder in her father; Other in her sister; Ovarian Cancer (age of onset: 60) in her  maternal aunt.  Social History:  She  reports that she has been smoking cigarettes. She has a 20 pack-year smoking history. She has never used smokeless tobacco. She reports current alcohol use of about 2.0 standard drinks of alcohol per week. She reports current drug use. Frequency: 1.00 time per week. Drug: Cannabis.    Tobacco:  Social History     Tobacco Use   Smoking Status Every Day    Current packs/day: 0.50    Average packs/day: 0.5 packs/day for 40.0 years (20.0 ttl pk-yrs)    Types: Cigarettes   Smokeless Tobacco Never   Tobacco Comments    cutting back certainly now     E-Cigarettes/Vaping       Questions Responses    E-Cigarette Use Never User           Tobacco cessation counseling for <3 minutes.      CAGE Alcohol Screen:   She has been screened for alcohol abuse and her score is not 0:  Cut: Have you ever felt you should Cut down on your drinking?: Yes  Annoyed: Have people Annoyed you by criticizing your drinking?: Yes  Guilty: Have you ever felt bad or Guilty about your drinking?: Yes  Eye Opener: Have you ever had a drink first thing in the morning to steady your nerves or to get rid of a hangover (Eye opener)?: No  Total Score: 3      Patient Care Team:  Sandip Medrano MD as PCP - General (Family Medicine)  Kris Salinas MD (RHEUMATOLOGY)    Review of Systems   GENERAL HEALTH: otherwise feels well  SKIN: No other skin concerns  HEENT: denies nasal congestion, sinus pain or sore throat; hearing loss negative  RESPIRATORY: denies shortness of breath, wheezing or cough   Has her chronic lung issues    CARDIOVASCULAR: denies chest pain or JEFF; no palpitations   MUSCULOSKELETAL: no joint complaints upper or lower extremities  PSYCHE: no symptoms of depression or anxiety      Objective:   Physical Exam  General Appearance:  Alert, cooperative, no distress, appears stated age   Head:  Normocephalic, without obvious abnormality, atraumatic   Eyes:  PERRL, conjunctiva/corneas clear, EOM's intact both  eyes   Back:   Symmetric, no curvature, ROM normal, no CVA tenderness   Lungs:   Clear to auscultation bilaterally, respirations unlabored   Heart:  Regular rate and rhythm, S1 and S2 normal, no murmur, rub, or gallop   Abdomen:   Soft, non-tender, bowel sounds active all four quadrants,  no masses, no organomegaly   Pelvic: Deferred   Extremities: Extremities normal, atraumatic, no cyanosis or edema   Pulses: 2+ and symmetric   Skin: Skin color, texture, turgor normal, no rashes or lesions   Lymph nodes: Cervical, supraclavicular, and axillary nodes normal   Neurologic: Normal       /74 (BP Location: Left arm, Patient Position: Sitting, Cuff Size: large)   Pulse 87   Temp 98.1 °F (36.7 °C) (Temporal)   Resp 12   Ht 5' 8\" (1.727 m)   Wt 257 lb 4 oz (116.7 kg)   SpO2 96%   BMI 39.11 kg/m²  Estimated body mass index is 39.11 kg/m² as calculated from the following:    Height as of this encounter: 5' 8\" (1.727 m).    Weight as of this encounter: 257 lb 4 oz (116.7 kg).    Medicare Hearing Assessment:   Hearing Screening    Time taken: 9/17/2024  1:43 PM  Entry User: Reed Mabry MA  Screening Method: Whisper Test  Whisper Test Result: Pass         Visual Acuity:   Right Eye Visual Acuity: Uncorrected Right Eye Chart Acuity: 20/40   Left Eye Visual Acuity: Uncorrected Left Eye Chart Acuity: 20/40   Both Eyes Visual Acuity: Uncorrected     Able To Tolerate Visual Acuity: Yes        Assessment & Plan:   Tila Cassidy is a 63 year old female who presents for a Medicare Assessment.     1. Encounter for Medicare annual wellness exam (Primary)  2. Other systemic lupus erythematosus with lung involvement (HCC)  Assessment & Plan:  This is managed by rheumatology--chart reviewed and medications reviewed  Patient encouraged to continue with treatment plan   3. Stage 3a chronic kidney disease (CKD) (HCC)  -     Comp Metabolic Panel (14); Future; Expected date: 09/17/2024  4. Chronic obstructive pulmonary disease,  unspecified COPD type (HCC)  Assessment & Plan:  This is managed by pulmonology--chart reviewed and medications reviewed  Patient encouraged to continue with treatment plan   5. Class 2 severe obesity due to excess calories with serious comorbidity and body mass index (BMI) of 39.0 to 39.9 in adult (HCC)  Overview:  Estimated body mass index is 39.11 kg/m² as calculated from the following:    Height as of this encounter: 5' 8\" (1.727 m).    Weight as of this encounter: 257 lb 4 oz (116.7 kg).    Assessment & Plan:  Patient has copd and lupus and fibromyalgia  as comorbidities   6. Primary hypertension  Overview:  Blood Pressure and Cardiac Medications            BENAZEPRIL HCL 20 MG Oral Tab    ATENOLOL 25 MG Oral Tab            Assessment & Plan:  BP shows good control with last BP of 130/74. Continue lifestyle changes, diet, exercise and weight loss.   9/17/2024: Potassium 4.2; Creatinine 1.26 (H); eGFR-Cr 48 (L)  BP Meds: atenolol Tabs - 25 MG; Benazepril HCl Tabs - 20 MG   ACE/ARB Adherence Good at 92%     7. Primary osteoarthritis of both knees  Assessment & Plan:  Weight oloss would be helpful      8. Fibromyalgia  Assessment & Plan:  Continue general care and alsofocus on rest and diet   9. Chronic anticoagulation-coumadin  Assessment & Plan:  See management todyay and correction for possible error in home monitor  Orders:  -     POCT Coagucheck  10. Visit for screening mammogram  -     3D Mammogram Digital Screen, Bilateral (CPT=77067/61514); Future; Expected date: 09/17/2024  11. Screening for diabetes mellitus  -     Comp Metabolic Panel (14); Future; Expected date: 09/17/2024  12. Hyperglycemia  -     Comp Metabolic Panel (14); Future; Expected date: 09/17/2024  13. Dyslipidemia  Overview:    On no medications    Assessment & Plan:  Mild elevation in past  monitor   off medications now    Orders:  -     Lipid Panel; Future; Expected date: 09/17/2024  14. Essential hypertension  -     Comp Metabolic Panel  (14); Future; Expected date: 09/17/2024  15. Encounter for current long-term use of anticoagulants  -     POCT [place]  16. Encounter for long-term (current) use of medications    The patient indicates understanding of these issues and agrees to the plan.  See order for change in the warfarin dosing consistent with office visit reading of low subtherapeutic inr  Continue with current treatment plan.  Lab work ordered.  Reinforced healthy diet, lifestyle, and exercise.      No follow-ups on file.     Sandip Medrano MD, 9/17/2024     Supplementary Documentation:   General Health:  In the past six months, have you lost more than 10 pounds without trying?: 2 - No  Has your appetite been poor?: No  Type of Diet: Balanced  How does the patient maintain a good energy level?: Stretching  How would you describe your daily physical activity?: Light  How would you describe your current health state?: Fair  How do you maintain positive mental well-being?: Games;Puzzles;Social Interaction;Visiting Family;Visiting Friends  On a scale of 0 to 10, with 0 being no pain and 10 being severe pain, what is your pain level?: 2 - (Mild)  In the past six months, have you experienced urine leakage?: 1-Yes  At any time do you feel concerned for the safety/well-being of yourself and/or your children, in your home or elsewhere?: No  Have you had any immunizations at another office such as Influenza, Hepatitis B, Tetanus, or Pneumococcal?: No    Health Maintenance   Topic Date Due    DTaP,Tdap,and Td Vaccines (1 - Tdap) Never done    Zoster Vaccines (1 of 2) Never done    Lung Cancer Screening  Never done    Tobacco Cessation Counseling  01/01/2024    COVID-19 Vaccine (1 - 2023-24 season) Never done    Annual Physical  09/14/2024    Mammogram  11/06/2024    Influenza Vaccine (1) 10/01/2024    Colorectal Cancer Screening  06/01/2025    Pap Smear  06/27/2027    Annual Depression Screening  Completed    Pneumococcal Vaccine: Birth to 64yrs   Completed

## 2024-09-17 NOTE — TELEPHONE ENCOUNTER
Faxed received from [facility]  PT/INR Self Testing Service      INR 3.9  test date 9-17-24     Current dose of coumadin 6 mg every day     Routed to provider, see lab results for further details.

## 2024-09-18 DIAGNOSIS — I10 PRIMARY HYPERTENSION: Primary | ICD-10-CM

## 2024-09-18 DIAGNOSIS — E78.5 DYSLIPIDEMIA: ICD-10-CM

## 2024-09-18 LAB
DSDNA IGG SERPL IA-ACNC: 0.8 IU/ML
ENA AB SER QL IA: 0.3 UG/L
ENA AB SER QL IA: NEGATIVE

## 2024-09-22 PROBLEM — I12.9 HYPERTENSIVE KIDNEY DISEASE WITH CKD (CHRONIC KIDNEY DISEASE): Status: ACTIVE | Noted: 2020-01-09

## 2024-09-22 PROBLEM — E78.5 DYSLIPIDEMIA: Status: ACTIVE | Noted: 2024-09-22

## 2024-09-22 PROBLEM — N18.31 CKD STAGE 3A, GFR 45-59 ML/MIN (HCC): Status: ACTIVE | Noted: 2024-09-22

## 2024-09-22 PROBLEM — N18.31 STAGE 3A CHRONIC KIDNEY DISEASE (CKD) (HCC): Status: ACTIVE | Noted: 2020-01-09

## 2024-09-22 PROBLEM — E66.812 CLASS 2 SEVERE OBESITY DUE TO EXCESS CALORIES WITH SERIOUS COMORBIDITY AND BODY MASS INDEX (BMI) OF 39.0 TO 39.9 IN ADULT (HCC): Status: ACTIVE | Noted: 2020-01-15

## 2024-09-22 PROBLEM — E66.01 CLASS 2 SEVERE OBESITY DUE TO EXCESS CALORIES WITH SERIOUS COMORBIDITY AND BODY MASS INDEX (BMI) OF 39.0 TO 39.9 IN ADULT (HCC): Status: ACTIVE | Noted: 2020-01-15

## 2024-09-22 NOTE — ASSESSMENT & PLAN NOTE
BP shows good control with last BP of 130/74. Continue lifestyle changes, diet, exercise and weight loss.   9/17/2024: Potassium 4.2; Creatinine 1.26 (H); eGFR-Cr 48 (L)  BP Meds: atenolol Tabs - 25 MG; Benazepril HCl Tabs - 20 MG   ACE/ARB Adherence Good at 92%

## 2024-09-22 NOTE — ASSESSMENT & PLAN NOTE
This is managed by pulmonology--chart reviewed and medications reviewed  Patient encouraged to continue with treatment plan

## 2024-09-22 NOTE — ASSESSMENT & PLAN NOTE
This is managed by rheumatology--chart reviewed and medications reviewed  Patient encouraged to continue with treatment plan

## 2024-09-25 ENCOUNTER — TELEPHONE (OUTPATIENT)
Dept: FAMILY MEDICINE CLINIC | Facility: CLINIC | Age: 64
End: 2024-09-25

## 2024-09-25 NOTE — TELEPHONE ENCOUNTER
Per this  Same dose check 1 week  But she is welcome to come in to office for inr check to see if it agrees

## 2024-09-25 NOTE — TELEPHONE ENCOUNTER
mdINR: 2.9    Currently on Warfarin 8 mg PO Daily    I know we had concerns about her testing at home and if the results were accurate. I see the last INR on 9/17/24 was 1.4. Please advise.

## 2024-09-25 NOTE — TELEPHONE ENCOUNTER
Contacted patient.  Advised of Dr. Medrano's recommendations.  Patient prefers to schedule nurse visit for INR.     Nurse visit scheduled.   Future Appointments   Date Time Provider Department Center   10/1/2024  4:00 PM [facility] [place]   2/27/2025 10:00 AM [facility] [place]

## 2024-10-01 ENCOUNTER — ANTI-COAG VISIT (OUTPATIENT)
Dept: FAMILY MEDICINE CLINIC | Facility: CLINIC | Age: 64
End: 2024-10-01
Payer: MEDICARE

## 2024-10-01 DIAGNOSIS — Z79.01 CHRONIC ANTICOAGULATION: Primary | ICD-10-CM

## 2024-10-01 LAB — INR: 2.7 (ref 0.8–1.2)

## 2024-10-01 PROCEDURE — 93793 ANTICOAG MGMT PT WARFARIN: CPT | Performed by: FAMILY MEDICINE

## 2024-10-01 PROCEDURE — 85610 PROTHROMBIN TIME: CPT | Performed by: FAMILY MEDICINE

## 2024-10-01 NOTE — PROGRESS NOTES
Patient presents to office for INR check, capillary puncture to Left 3rd finger, INR of 2.7 obtained, bandage applied and patient left office in stable condition.    Patient reports taking coumadin 8 mg daily.    Patient checked INR with home machine this AM: 11:06 am INR 4.4 and at 11:10 am INR 3.4, patient has had ongoing issues with inaccurate results with home machine and will reach out to them today regarding discrepancy in results  Dr Medrano aware of above, result from [place] routed to provide.

## 2024-10-02 NOTE — LETTER
Called pt's mom and informed her blood work orders were placed. And she should take pt to urgent care if any changes. She verbalized understanding and is agreeable to plan.    Last Revised 02/07/06  Obstructive Sleep Apnea Questionnaire    Clinical signs and symptoms suggesting the possibility of JOHN    1. Predisposing physical characteristics (positive with any of the following present)  ? BMI 35kg/m²  ?  Craniofacial abnormalit pauses which are frightening to the observer, patient regularly falls asleep within minutes after being left unstimulated) in which case they should be treated as though they have severe sleep apnea.     The sleep laboratory’s assessment (none, mild, modera Point Total for B           C. Requirement for postoperative opioids.                Opioid requirement             Points   None 0    Low dose oral opiod 1    High dose oral opioids, parenteral or neuraxial opiods 3      Point Total for C        Estimation

## 2024-10-08 ENCOUNTER — TELEPHONE (OUTPATIENT)
Dept: FAMILY MEDICINE CLINIC | Facility: CLINIC | Age: 64
End: 2024-10-08

## 2024-10-08 LAB — INR: 1.9 (ref 0.8–1.2)

## 2024-10-08 NOTE — TELEPHONE ENCOUNTER
Per Dr. Medrano - no changes in medication.  Recheck in one week. - v.o. Dr. Medrano.     Left message for patient to call.

## 2024-10-15 ENCOUNTER — TELEPHONE (OUTPATIENT)
Dept: FAMILY MEDICINE CLINIC | Facility: CLINIC | Age: 64
End: 2024-10-15

## 2024-10-15 LAB — INR: 3.2 (ref 0.8–1.2)

## 2024-10-15 NOTE — TELEPHONE ENCOUNTER
Faxed received from [facility]  PT/INR Self Testing Service      INR 3.2 test date 10-15-24     Current dose of coumadin 8 mg daily     Routed to provider,see lab results for further details

## 2024-10-16 DIAGNOSIS — Z51.81 ANTICOAGULATION MANAGEMENT ENCOUNTER: ICD-10-CM

## 2024-10-16 DIAGNOSIS — Z20.828 EXPOSURE TO EPSTEIN-BARR VIRUS: Primary | ICD-10-CM

## 2024-10-16 DIAGNOSIS — Z79.01 ENCOUNTER FOR CURRENT LONG-TERM USE OF ANTICOAGULANTS: ICD-10-CM

## 2024-10-16 DIAGNOSIS — Z79.01 ANTICOAGULATION MANAGEMENT ENCOUNTER: ICD-10-CM

## 2024-10-16 RX ORDER — WARFARIN SODIUM 4 MG/1
TABLET ORAL
COMMUNITY
Start: 2024-10-16

## 2024-10-16 RX ORDER — WARFARIN SODIUM 1 MG/1
TABLET ORAL
COMMUNITY
Start: 2024-10-16

## 2024-10-16 NOTE — TELEPHONE ENCOUNTER
Spoke with patient regarding results and she verbalized understanding. See lab results for further details.

## 2024-10-16 NOTE — PROGRESS NOTES
Spoke with patient and advised that Dr. Medrano placed an order for blood work to check the EBV. Order faxed to [facility] per patient request.   
no
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
## 2024-10-17 DIAGNOSIS — I10 ESSENTIAL HYPERTENSION: ICD-10-CM

## 2024-10-17 RX ORDER — ATENOLOL 25 MG/1
25 TABLET ORAL DAILY
Qty: 90 TABLET | Refills: 0 | Status: SHIPPED | OUTPATIENT
Start: 2024-10-17

## 2024-10-17 NOTE — TELEPHONE ENCOUNTER
Last refill: 08/05/24  Qty: 90  W/ 0 refills  Last ov: 09/17/24    Requested Prescriptions     Pending Prescriptions Disp Refills    ATENOLOL 25 MG Oral Tab [Pharmacy Med Name: ATENOLOL 25MG TABLETS] 90 tablet 0     Sig: TAKE 1 TABLET(25 MG) BY MOUTH DAILY     Future Appointments   Date Time Provider Department Center   10/22/2024  3:00 PM EMG [facility] NURSE [facility] [place]   2/27/2025 10:00 AM [facility] [place]     BP Readings from Last 3 Encounters:   09/17/24 130/74   08/29/24 112/68   07/24/24 136/86

## 2024-10-19 ENCOUNTER — TELEPHONE (OUTPATIENT)
Dept: FAMILY MEDICINE CLINIC | Facility: CLINIC | Age: 64
End: 2024-10-19

## 2024-10-19 NOTE — TELEPHONE ENCOUNTER
Fax from [facility] with results of Aby Barr Virus antibody panel collected on 10-17-24    Dr Medrano carefully reviewed and documented:  Evidence of prior exposure (may be very old).   No evidence of active infection.    Copy to scan    Patient notified of results and verbalized understanding

## 2024-10-20 NOTE — TELEPHONE ENCOUNTER
Patient requesting refill on alprazolam 1 mg  and Norco 5-325     LOV  5-13-24    LAST LAB  2-9-24    LAST RX  alprazolam 6-3-24  #30                   Norco   5-13-24   #30    Next OV    Future Appointments   Date Time Provider Department Center   8/29/2024 12:20 PM Kris Salinas MD [facility] [place]          Wheelchair

## 2024-10-22 DIAGNOSIS — F13.20 SEDATIVE, HYPNOTIC OR ANXIOLYTIC DEPENDENCE, UNCOMPLICATED (HCC): ICD-10-CM

## 2024-10-22 LAB — INR: 2.5 (ref 0.8–1.2)

## 2024-10-22 RX ORDER — ALPRAZOLAM 1 MG/1
0.5 TABLET ORAL 3 TIMES DAILY PRN
Qty: 45 TABLET | Refills: 1 | Status: SHIPPED | OUTPATIENT
Start: 2024-10-22

## 2024-10-22 NOTE — TELEPHONE ENCOUNTER
See telephone encounter 10-19-24, reviewed results and provider comments from that encounter, patient verbalized understanding.    Patient requesting refill on alprazolam      LOV  9-17-24    LAST LAB  9-17-24    LAST RX  9-27-24  #45    Next OV    Future Appointments   Date Time Provider Department Center   2/27/2025 10:00 AM Kris Salinas MD [facility] [place]     PROTOCOL  None

## 2024-10-22 NOTE — TELEPHONE ENCOUNTER
Faxed received from [facility]  PT/INR Self Testing Service      INR 2.5 test date 10-22-24     Per patient taking coumadin 6 mg 2 days of the week and 8mg 5 days of the week      Routed to provider,see lab results for further details

## 2024-10-22 NOTE — TELEPHONE ENCOUNTER
ANTICOAGULATION THERAPY EDUCATION   PATIENT  INSTRUCTION    Your Guide to Using Warfarin:  Please refer to this handout for questions regarding your medication, Coumadin/Warfarin. This handout includes information on dosing, blood testing, possible side effects of Coumadin/Warfarin, using other medications, dietary guidelines and safety concerns while on anticoagulation therapy.    Please contact your primary care physician and/or seek appropriate medical care if you experience:  · A serious fall  · Increased menstrual bleeding   · An injury to your head  · Notice a different color urine or stool   · Increased bleeding with teeth brushing   · If you have any other unusual bruising   · Have bleeding from your nose or a cut that doesn't stop bleeding         Call your physician immediately if you notice any signs and symptoms of a blood clot such as:  · Sudden weakness in any limb  · Dizziness or faintness   · Numbness or tingling anywhere  · New shortness of breath or chest pain   · Sudden onset of slurred speech or inability to speak  · New pain, swelling, redness or heat in any extremity   · Visual changes or loss of sight in either eye         Other factors that may affect your anticoagulation therapy include:  · Fever  · Stress   · Diarrhea  · Changes in exercise/activity level   · Vomiting         While on Anticoagulation therapy avoid:  · Pregnancy, using birth control and hormone replacement therapy    · Body piercing or tattoos      Please inform family members and other health care providers that you are on anticoagulation therapy. Make sure to carry an up-to-date medication list. You can also wear a medical alert bracelet to identify that you are on anticoagulation therapy.    If you are utilizing an injectable anticoagulation medication you should be aware of how and where the medication should be injected and how to appropriately dispose of the injection needles (sharps).    Additional patient  Left detailed message on patient's designated voicemail   education materials provided to you:  · Important Facts about your Coumadin (color handout)  · Vitamin K Food List  · Travel Fitness Tips for Patients on Coumadin  · Prevention and Treatment of Nosebleeds  · When To Call Your Physician  · Potential and Documented Interaction of Herbs with Coumadin/Warfarin  · Lab hours for [facility]

## 2024-10-23 DIAGNOSIS — Z51.81 ANTICOAGULATION MANAGEMENT ENCOUNTER: ICD-10-CM

## 2024-10-23 DIAGNOSIS — M17.0 PRIMARY OSTEOARTHRITIS OF BOTH KNEES: ICD-10-CM

## 2024-10-23 DIAGNOSIS — Z79.01 ANTICOAGULATION MANAGEMENT ENCOUNTER: ICD-10-CM

## 2024-10-23 RX ORDER — WARFARIN SODIUM 4 MG/1
TABLET ORAL
Qty: 30 TABLET | Refills: 0 | Status: SHIPPED | OUTPATIENT
Start: 2024-10-23

## 2024-10-23 RX ORDER — HYDROCODONE BITARTRATE AND ACETAMINOPHEN 5; 325 MG/1; MG/1
1 TABLET ORAL EVERY 4 HOURS PRN
Qty: 30 TABLET | Refills: 0 | Status: SHIPPED | OUTPATIENT
Start: 2024-10-23 | End: 2024-11-22

## 2024-10-23 NOTE — TELEPHONE ENCOUNTER
Last office visit: 24  Last refill: Hydrocodone: 24, Warfarin 4 m24  Labs Due: 10/2924  Future Appointments   Date Time Provider Department Center   2025 10:00 AM [facility] [place]

## 2024-10-23 NOTE — TELEPHONE ENCOUNTER
Warfarin 4mg,    hydrocodone   [facility] in [place]   she is going to be in town tomorrow so hoping to get these then

## 2024-10-29 ENCOUNTER — TELEPHONE (OUTPATIENT)
Dept: FAMILY MEDICINE CLINIC | Facility: CLINIC | Age: 64
End: 2024-10-29

## 2024-10-29 LAB — INR: 2.8 (ref 0.8–1.2)

## 2024-10-29 NOTE — TELEPHONE ENCOUNTER
Faxed received from [facility]  PT/INR Self Testing Service      INR 2.8 test date 10-29-24      Result routed to provider        As noted on telephone encounter 10-22-24 patient taking coumadin 6 mg 2 days of the week and 8mg 5 days of the week

## 2024-11-05 ENCOUNTER — TELEPHONE (OUTPATIENT)
Dept: FAMILY MEDICINE CLINIC | Facility: CLINIC | Age: 64
End: 2024-11-05

## 2024-11-05 ENCOUNTER — ANTI-COAG VISIT (OUTPATIENT)
Dept: FAMILY MEDICINE CLINIC | Facility: CLINIC | Age: 64
End: 2024-11-05
Payer: MEDICARE

## 2024-11-05 DIAGNOSIS — Z79.01 CHRONIC ANTICOAGULATION: Primary | ICD-10-CM

## 2024-11-05 LAB — INR: 1.3 (ref 0.8–1.2)

## 2024-11-05 PROCEDURE — 85610 PROTHROMBIN TIME: CPT | Performed by: FAMILY MEDICINE

## 2024-11-05 PROCEDURE — 93793 ANTICOAG MGMT PT WARFARIN: CPT | Performed by: FAMILY MEDICINE

## 2024-11-05 NOTE — TELEPHONE ENCOUNTER
Patient wondering if there are any modifications she should be making due to EBV (see encounter 10-19-24). She mentioned if there would be any foods to avoid, activity limitations

## 2024-11-05 NOTE — TELEPHONE ENCOUNTER
Patients INR today on her home machine is 1.2. she is asking if she can come in our office today and get a INR to compare. She thinks her machine is off.

## 2024-11-07 ENCOUNTER — TELEPHONE (OUTPATIENT)
Dept: FAMILY MEDICINE CLINIC | Facility: CLINIC | Age: 64
End: 2024-11-07

## 2024-11-07 LAB — INR: 1.3 (ref 0.8–1.2)

## 2024-11-07 NOTE — TELEPHONE ENCOUNTER
Result forwarded to Dr Medrano, patient was in on 11-5-24 for INR and level addressed by provider on that date.

## 2024-11-12 ENCOUNTER — TELEPHONE (OUTPATIENT)
Dept: FAMILY MEDICINE CLINIC | Facility: CLINIC | Age: 64
End: 2024-11-12

## 2024-11-12 DIAGNOSIS — Z51.81 ANTICOAGULATION MANAGEMENT ENCOUNTER: ICD-10-CM

## 2024-11-12 DIAGNOSIS — Z79.01 ANTICOAGULATION MANAGEMENT ENCOUNTER: ICD-10-CM

## 2024-11-12 LAB — INR: 3 (ref 0.8–1.2)

## 2024-11-12 NOTE — TELEPHONE ENCOUNTER
Faxed received from [facility]  PT/INR Self Testing Service      INR 3.0 test date 11-12-24      Result routed to provider            Current dose of coumadin is 8 mg daily

## 2024-11-13 RX ORDER — WARFARIN SODIUM 4 MG/1
TABLET ORAL
Qty: 30 TABLET | Refills: 0 | Status: SHIPPED | OUTPATIENT
Start: 2024-11-13

## 2024-11-13 NOTE — TELEPHONE ENCOUNTER
LOV: 9/17/2024  LAST LAB: 10-4-2024  LAST RX:           warfarin 4 MG Oral Tab 30 tablet 0 10/23/2024 --   Sig:   Take 1 tablet by mouth daily along with 1 mg tablet to equal 7 mg on Tuesday and Friday and 8 mg all other days.       Next OV :   Future Appointments   Date Time Provider Department Center   2/27/2025 10:00 AM Kris Salinas MD [facility] [place]   PROTOCOL    
You can access the FollowMyHealth Patient Portal offered by [facility] by registering at the following website: [email or web address]. By joining [facility]’s FollowMyHealth portal, you will also be able to view your health information using other applications (apps) compatible with our system.

## 2024-11-19 ENCOUNTER — ANTI-COAG VISIT (OUTPATIENT)
Dept: FAMILY MEDICINE CLINIC | Facility: CLINIC | Age: 64
End: 2024-11-19
Payer: MEDICARE

## 2024-11-19 ENCOUNTER — TELEPHONE (OUTPATIENT)
Dept: FAMILY MEDICINE CLINIC | Facility: CLINIC | Age: 64
End: 2024-11-19

## 2024-11-19 DIAGNOSIS — Z79.01 CHRONIC ANTICOAGULATION: Primary | ICD-10-CM

## 2024-11-19 LAB — INR: 3.2 (ref 0.8–1.2)

## 2024-11-19 PROCEDURE — 93793 ANTICOAG MGMT PT WARFARIN: CPT | Performed by: FAMILY MEDICINE

## 2024-11-19 PROCEDURE — 85610 PROTHROMBIN TIME: CPT | Performed by: FAMILY MEDICINE

## 2024-11-19 NOTE — TELEPHONE ENCOUNTER
Reviewed INR results with Dr Medrano who advised no change in coumadin dose and recheck in one week.     Patient notified and verbalized understanding.

## 2024-11-19 NOTE — PROGRESS NOTES
Patient presents to office for INR check. Capillary puncture to Left 3rd finger, INR of 3.2 obtained, bandage applied and patient left office in stable condition.    Verified current coumadin dose 8 mg daily    Result routed to provider

## 2024-11-19 NOTE — TELEPHONE ENCOUNTER
Faxed received from [facility]  PT/INR Self Testing Service      INR 4.1 test date 11-19-24                  Current dose of coumadin is 8 mg daily      Patient came in for INR as she wanted to verify home reading. See result note from today.

## 2024-11-26 ENCOUNTER — TELEPHONE (OUTPATIENT)
Dept: FAMILY MEDICINE CLINIC | Facility: CLINIC | Age: 64
End: 2024-11-26

## 2024-11-26 LAB — INR: 2.3 (ref 0.8–1.2)

## 2024-11-26 NOTE — TELEPHONE ENCOUNTER
Faxed received from [facility]  PT/INR Self Testing Service      INR 2.3 test date 11-26-24                  Current dose of coumadin is 8 mg daily        Result entered and routed to provider

## 2024-12-03 ENCOUNTER — TELEPHONE (OUTPATIENT)
Dept: FAMILY MEDICINE CLINIC | Facility: CLINIC | Age: 64
End: 2024-12-03

## 2024-12-03 LAB — INR: 2.5 (ref 0.8–1.2)

## 2024-12-03 NOTE — TELEPHONE ENCOUNTER
Faxed received from [facility]  PT/INR Self Testing Service      INR 2.5 test date 12-3-24                Current dose of coumadin is 8 mg daily        Result entered and routed to provider

## 2024-12-04 DIAGNOSIS — Z51.81 ANTICOAGULATION MANAGEMENT ENCOUNTER: ICD-10-CM

## 2024-12-04 DIAGNOSIS — Z79.01 ANTICOAGULATION MANAGEMENT ENCOUNTER: ICD-10-CM

## 2024-12-04 RX ORDER — WARFARIN SODIUM 4 MG/1
TABLET ORAL
Qty: 60 TABLET | Refills: 0 | Status: SHIPPED | OUTPATIENT
Start: 2024-12-04

## 2024-12-04 RX ORDER — WARFARIN SODIUM 5 MG/1
TABLET ORAL
Qty: 30 TABLET | Refills: 0 | Status: SHIPPED | OUTPATIENT
Start: 2024-12-04

## 2024-12-04 NOTE — TELEPHONE ENCOUNTER
Warfarin #30 5mg and # 60 4mg      Agnes in sandwich   she also knows that she is to stay on the same dosage and her lab was good so no need to call her back

## 2024-12-04 NOTE — TELEPHONE ENCOUNTER
Call placed to patient and reviewed coumadin dosages    LOV  9-17-24    LAST LAB  9-17-24    LAST RX  warfarin 4 mg  #30                                    Next OV    Future Appointments   Date Time Provider Department Center   2/27/2025 10:00 AM Kris Salinas MD [facility] [place]       PROTOCOL  None

## 2024-12-10 ENCOUNTER — TELEPHONE (OUTPATIENT)
Dept: FAMILY MEDICINE CLINIC | Facility: CLINIC | Age: 64
End: 2024-12-10

## 2024-12-10 LAB — INR: 1.9 (ref 0.8–1.2)

## 2024-12-10 NOTE — TELEPHONE ENCOUNTER
Faxed received from [facility]  PT/INR Self Testing Service      INR 1.9 test date 12-10-24                Current dose of coumadin is 8 mg daily        Result entered and routed to provider

## 2024-12-10 NOTE — TELEPHONE ENCOUNTER
Received fax from [facility] regarding mammogram results with exam date of 12-10-24    Dr Medrano carefully reviewed and documented:  Normal, check one year.    [facility] updated    Copy to scan    Patient notified and verbalized understanding.

## 2024-12-17 ENCOUNTER — TELEPHONE (OUTPATIENT)
Dept: FAMILY MEDICINE CLINIC | Facility: CLINIC | Age: 64
End: 2024-12-17

## 2024-12-17 LAB — INR: 3 (ref 0.8–1.2)

## 2024-12-17 NOTE — TELEPHONE ENCOUNTER
Faxed received from [facility]  PT/INR Self Testing Service      INR 3.0 test date 12-17-24                Current dose of coumadin is 8 mg daily        Result entered and routed to provider

## 2024-12-19 DIAGNOSIS — F13.20 SEDATIVE, HYPNOTIC OR ANXIOLYTIC DEPENDENCE, UNCOMPLICATED (HCC): ICD-10-CM

## 2024-12-19 DIAGNOSIS — M17.0 PRIMARY OSTEOARTHRITIS OF BOTH KNEES: ICD-10-CM

## 2024-12-19 NOTE — TELEPHONE ENCOUNTER
Request for refills on Alprazolam 1 mg and Norco 5-325    LOV  9-17-24    LAST LAB  9-17-24    LAST RX  11-25-24 Alprazolam #45                    10-23-24 [place]   #30    Next OV    Future Appointments   Date Time Provider Department Center   2/27/2025 10:00 AM Kris Salinas MD [facility] [place]     PROTOCOL   None        Patient aware that Dr Medrano will review upon his return, has enough medication.

## 2024-12-20 RX ORDER — ALPRAZOLAM 1 MG/1
0.5 TABLET ORAL 3 TIMES DAILY PRN
Qty: 45 TABLET | Refills: 1 | Status: SHIPPED | OUTPATIENT
Start: 2024-12-20

## 2024-12-20 RX ORDER — HYDROCODONE BITARTRATE AND ACETAMINOPHEN 5; 325 MG/1; MG/1
1 TABLET ORAL EVERY 4 HOURS PRN
Qty: 30 TABLET | Refills: 0 | Status: SHIPPED | OUTPATIENT
Start: 2024-12-20 | End: 2025-01-19

## 2024-12-24 ENCOUNTER — TELEPHONE (OUTPATIENT)
Dept: FAMILY MEDICINE CLINIC | Facility: CLINIC | Age: 64
End: 2024-12-24

## 2024-12-24 LAB — INR: 2.8 (ref 0.8–1.2)

## 2024-12-24 NOTE — TELEPHONE ENCOUNTER
Reviewed with Dr Medrano, no change check in one week    Patient notified and verbalized understanding

## 2024-12-24 NOTE — TELEPHONE ENCOUNTER
Fax received from [facility]  PT/INR Self Testing Service      INR 2.8 test date 12-24-24               Current dose of coumadin is 8 mg daily        Result entered and routed to provider

## 2024-12-31 ENCOUNTER — TELEPHONE (OUTPATIENT)
Dept: FAMILY MEDICINE CLINIC | Facility: CLINIC | Age: 64
End: 2024-12-31

## 2024-12-31 LAB — INR: 3.7 (ref 0.8–1.2)

## 2024-12-31 NOTE — TELEPHONE ENCOUNTER
Result entered    Patient notified of provider comments/recommendations, verbalized understanding.

## 2025-01-04 ENCOUNTER — MED REC SCAN ONLY (OUTPATIENT)
Dept: FAMILY MEDICINE CLINIC | Facility: CLINIC | Age: 65
End: 2025-01-04

## 2025-01-08 ENCOUNTER — TELEPHONE (OUTPATIENT)
Dept: FAMILY MEDICINE CLINIC | Facility: CLINIC | Age: 65
End: 2025-01-08

## 2025-01-08 LAB — INR: 1.3 (ref 0.8–1.2)

## 2025-01-08 NOTE — TELEPHONE ENCOUNTER
Patient INR is 1.3.  She thinks 7 mg is good so she will start 7 mg tonight unless dr feels different.

## 2025-01-08 NOTE — TELEPHONE ENCOUNTER
Reviewed with Dr Medrano and verbalized agreement for patient to take 7 mg daily and recheck in one week    Patient notified and verbalized understanding.    Result entered.

## 2025-01-09 ENCOUNTER — TELEPHONE (OUTPATIENT)
Dept: FAMILY MEDICINE CLINIC | Facility: CLINIC | Age: 65
End: 2025-01-09

## 2025-01-09 DIAGNOSIS — Z51.81 ANTICOAGULATION MANAGEMENT ENCOUNTER: ICD-10-CM

## 2025-01-09 DIAGNOSIS — Z79.01 ANTICOAGULATION MANAGEMENT ENCOUNTER: ICD-10-CM

## 2025-01-09 RX ORDER — WARFARIN SODIUM 4 MG/1
TABLET ORAL
Qty: 60 TABLET | Refills: 1 | Status: SHIPPED | OUTPATIENT
Start: 2025-01-09

## 2025-01-09 NOTE — TELEPHONE ENCOUNTER
Requested Prescriptions     Pending Prescriptions Disp Refills    warfarin 4 MG Oral Tab 60 tablet 0     Sig: Take 2 tablets (8 mg) nightly     Last refill 12/4/24 #60  LOV 9/17/24  Future Appointments   Date Time Provider Department Center   2/27/2025 10:00 AM Kris Salinas MD [facility] [place]     Last INR 1/7/25

## 2025-01-15 ENCOUNTER — TELEPHONE (OUTPATIENT)
Dept: FAMILY MEDICINE CLINIC | Facility: CLINIC | Age: 65
End: 2025-01-15

## 2025-01-15 DIAGNOSIS — Z79.01 ANTICOAGULATION MANAGEMENT ENCOUNTER: ICD-10-CM

## 2025-01-15 DIAGNOSIS — Z51.81 ANTICOAGULATION MANAGEMENT ENCOUNTER: ICD-10-CM

## 2025-01-15 DIAGNOSIS — I10 ESSENTIAL HYPERTENSION: ICD-10-CM

## 2025-01-15 LAB — INR: 2.6 (ref 0.8–1.2)

## 2025-01-15 RX ORDER — ATENOLOL 25 MG/1
25 TABLET ORAL DAILY
Qty: 90 TABLET | Refills: 0 | Status: SHIPPED | OUTPATIENT
Start: 2025-01-15

## 2025-01-15 RX ORDER — WARFARIN SODIUM 5 MG/1
TABLET ORAL
Qty: 30 TABLET | Refills: 0 | Status: SHIPPED | OUTPATIENT
Start: 2025-01-15

## 2025-01-15 RX ORDER — PREDNISONE 10 MG/1
10 TABLET ORAL DAILY
Qty: 90 TABLET | Refills: 3 | Status: SHIPPED | OUTPATIENT
Start: 2025-01-15

## 2025-01-15 NOTE — TELEPHONE ENCOUNTER
Fax received from [facility]  PT/INR Self Testing Service      INR 2.6 test date 1-15-25               Current dose of coumadin is 7 mg daily        Result entered and routed to provider

## 2025-01-15 NOTE — TELEPHONE ENCOUNTER
Per Dr Medrano no change in dose and check one week    Patient notified and verbalized understanding, agrees to continue with coumadin 7 mg daily

## 2025-01-15 NOTE — TELEPHONE ENCOUNTER
LOV:9/17/24    LAST LAB:1//9/2025    LAST RX:  warfarin 5 MG Oral Tab 30 tablet 0 12/4/2024 --   Sig:   Take with 3mg to equal 8mg nighlty       Medication Quantity Refills Start End   ATENOLOL 25 MG Oral Tab 90 tablet 0 10/17/2024 --   Sig:   TAKE 1 TABLET(25 MG) BY MOUTH DAILY       predniSONE 10 MG Oral Tab 90 tablet 3 2/8/2024 --   Sig:   Take 1 tablet (10 mg total) by mouth         Next OV:   Future Appointments   Date Time Provider Department Center   2/27/2025 10:00 AM Kris Salinas MD [facility] [place]          PROTOCOL    Hypertension Medications Protocol Tiqxzj80/15/2025 09:00 AM   Protocol Details EGFRCR or GFRNAA > 50    CMP or BMP in past 12 months    Last BP reading less than 140/90    In person appointment or virtual visit in the past 12 mos or appointment in next 3 mos    Medication is active on med list

## 2025-01-20 DIAGNOSIS — F13.20 SEDATIVE, HYPNOTIC OR ANXIOLYTIC DEPENDENCE, UNCOMPLICATED (HCC): ICD-10-CM

## 2025-01-20 NOTE — TELEPHONE ENCOUNTER
Request for refill on xanax     LOV  9-17-24    LAST LAB  9-17-24    LAST RX  12-23-24  #45      Next OV    Future Appointments   Date Time Provider Department Center   2/27/2025 10:00 AM Kris Salinas MD [facility] [place]     PROTOCOL    Controlled Substance Medication [id number]/20/2025 10:14 AM    This medication is a controlled substance - forward to provider to refill    Medication is active on med list

## 2025-01-21 ENCOUNTER — TELEPHONE (OUTPATIENT)
Dept: FAMILY MEDICINE CLINIC | Facility: CLINIC | Age: 65
End: 2025-01-21

## 2025-01-21 RX ORDER — ALPRAZOLAM 1 MG/1
0.5 TABLET ORAL 3 TIMES DAILY PRN
Qty: 45 TABLET | Refills: 1 | Status: SHIPPED | OUTPATIENT
Start: 2025-01-21

## 2025-01-21 NOTE — TELEPHONE ENCOUNTER
See intake message    Spoke with patient she is unsure her machine is working correctly and also not able to get her results submitted to company.  Patient would like to change monitoring companies if possible.    She will come into office for INR later this week, not able to come today.    Form completed to register patient with Md inr through [facility] in [place].

## 2025-01-21 NOTE — TELEPHONE ENCOUNTER
She has a question on her INR because today the first time it was 6.1 and the 2nd time was 7.2.  she thinks her machine is wrong

## 2025-01-21 NOTE — TELEPHONE ENCOUNTER
Reviewed with Dr Medrano who advised based on reported elevated INR level patient to hold coumadin and come in to office for level to be checked when she is able.    Order and data faxed to [employer] for Md INR at [phone]    Patient notified of above and appointment scheduled for INR:  Future Appointments   Date Time Provider Department Center   1/23/2025  4:00 PM [facility] NURSE [facility] [place]   2/27/2025 10:00 AM [facility] [place]

## 2025-01-23 ENCOUNTER — ANTI-COAG VISIT (OUTPATIENT)
Dept: FAMILY MEDICINE CLINIC | Facility: CLINIC | Age: 65
End: 2025-01-23
Payer: MEDICARE

## 2025-01-23 DIAGNOSIS — Z79.01 CHRONIC ANTICOAGULATION: Primary | ICD-10-CM

## 2025-01-23 LAB — INR: 2 (ref 0.8–1.2)

## 2025-01-23 PROCEDURE — 93793 ANTICOAG MGMT PT WARFARIN: CPT | Performed by: FAMILY MEDICINE

## 2025-01-23 PROCEDURE — 85610 PROTHROMBIN TIME: CPT | Performed by: FAMILY MEDICINE

## 2025-01-23 NOTE — PROGRESS NOTES
Patient presents to office for INR check. Capillary puncture to left 3rd finger, INR of 2.0 obtained, bandage applied. Patient tolerated well and left office in stable condition    Verified current coumadin dose is 7 mg daily    Result routed to provider.

## 2025-01-27 ENCOUNTER — OFFICE VISIT (OUTPATIENT)
Dept: FAMILY MEDICINE CLINIC | Facility: CLINIC | Age: 65
End: 2025-01-27
Payer: MEDICARE

## 2025-01-27 ENCOUNTER — LABORATORY ENCOUNTER (OUTPATIENT)
Dept: LAB | Age: 65
End: 2025-01-27
Attending: FAMILY MEDICINE
Payer: MEDICARE

## 2025-01-27 ENCOUNTER — TELEPHONE (OUTPATIENT)
Dept: FAMILY MEDICINE CLINIC | Facility: CLINIC | Age: 65
End: 2025-01-27

## 2025-01-27 VITALS
TEMPERATURE: 98 F | WEIGHT: 250 LBS | DIASTOLIC BLOOD PRESSURE: 88 MMHG | SYSTOLIC BLOOD PRESSURE: 138 MMHG | HEART RATE: 81 BPM | BODY MASS INDEX: 37.89 KG/M2 | OXYGEN SATURATION: 95 % | HEIGHT: 68 IN | RESPIRATION RATE: 12 BRPM

## 2025-01-27 DIAGNOSIS — Z20.828 EBV EXPOSURE: Primary | ICD-10-CM

## 2025-01-27 DIAGNOSIS — I12.9 HYPERTENSIVE RENAL DISEASE, STAGE 1 THROUGH STAGE 4 OR UNSPECIFIED CHRONIC KIDNEY DISEASE: ICD-10-CM

## 2025-01-27 DIAGNOSIS — J44.9 CHRONIC OBSTRUCTIVE PULMONARY DISEASE, UNSPECIFIED COPD TYPE (HCC): ICD-10-CM

## 2025-01-27 DIAGNOSIS — E78.5 DYSLIPIDEMIA: ICD-10-CM

## 2025-01-27 DIAGNOSIS — R91.1 PULMONARY NODULE 1 CM OR GREATER IN DIAMETER: ICD-10-CM

## 2025-01-27 DIAGNOSIS — Z20.828 EBV EXPOSURE: ICD-10-CM

## 2025-01-27 DIAGNOSIS — Z79.01 CHRONIC ANTICOAGULATION: ICD-10-CM

## 2025-01-27 DIAGNOSIS — M32.13 OTHER SYSTEMIC LUPUS ERYTHEMATOSUS WITH LUNG INVOLVEMENT (HCC): ICD-10-CM

## 2025-01-27 DIAGNOSIS — N18.31 CKD STAGE 3A, GFR 45-59 ML/MIN (HCC): ICD-10-CM

## 2025-01-27 DIAGNOSIS — R53.83 FATIGUE, UNSPECIFIED TYPE: ICD-10-CM

## 2025-01-27 DIAGNOSIS — Z20.828 EXPOSURE TO EPSTEIN-BARR VIRUS: ICD-10-CM

## 2025-01-27 DIAGNOSIS — Z13.1 SCREENING FOR DIABETES MELLITUS: ICD-10-CM

## 2025-01-27 LAB
ALBUMIN SERPL-MCNC: 4.1 G/DL (ref 3.2–4.8)
ALBUMIN/GLOB SERPL: 1.5 {RATIO} (ref 1–2)
ALP LIVER SERPL-CCNC: 88 U/L
ALT SERPL-CCNC: 11 U/L
ANION GAP SERPL CALC-SCNC: 7 MMOL/L (ref 0–18)
AST SERPL-CCNC: 15 U/L (ref ?–34)
BASOPHILS # BLD AUTO: 0.07 X10(3) UL (ref 0–0.2)
BASOPHILS NFR BLD AUTO: 0.8 %
BILIRUB SERPL-MCNC: 0.5 MG/DL (ref 0.2–1.1)
BUN BLD-MCNC: 18 MG/DL (ref 9–23)
CALCIUM BLD-MCNC: 9.4 MG/DL (ref 8.7–10.6)
CHLORIDE SERPL-SCNC: 105 MMOL/L (ref 98–112)
CHOLEST SERPL-MCNC: 201 MG/DL (ref ?–200)
CO2 SERPL-SCNC: 29 MMOL/L (ref 21–32)
CREAT BLD-MCNC: 1.13 MG/DL
CRP SERPL-MCNC: 0.8 MG/DL (ref ?–0.5)
EGFRCR SERPLBLD CKD-EPI 2021: 54 ML/MIN/1.73M2 (ref 60–?)
EOSINOPHIL # BLD AUTO: 0.22 X10(3) UL (ref 0–0.7)
EOSINOPHIL NFR BLD AUTO: 2.5 %
ERYTHROCYTE [DISTWIDTH] IN BLOOD BY AUTOMATED COUNT: 12.8 %
EST. AVERAGE GLUCOSE BLD GHB EST-MCNC: 123 MG/DL (ref 68–126)
FASTING PATIENT LIPID ANSWER: YES
FASTING STATUS PATIENT QL REPORTED: YES
GLOBULIN PLAS-MCNC: 2.8 G/DL (ref 2–3.5)
GLUCOSE BLD-MCNC: 89 MG/DL (ref 70–99)
HBA1C MFR BLD: 5.9 % (ref ?–5.7)
HCT VFR BLD AUTO: 46.7 %
HDLC SERPL-MCNC: 67 MG/DL (ref 40–59)
HGB BLD-MCNC: 15.6 G/DL
IMM GRANULOCYTES # BLD AUTO: 0.04 X10(3) UL (ref 0–1)
IMM GRANULOCYTES NFR BLD: 0.5 %
LDLC SERPL CALC-MCNC: 113 MG/DL (ref ?–100)
LYMPHOCYTES # BLD AUTO: 1.33 X10(3) UL (ref 1–4)
LYMPHOCYTES NFR BLD AUTO: 15.3 %
MCH RBC QN AUTO: 32.8 PG (ref 26–34)
MCHC RBC AUTO-ENTMCNC: 33.4 G/DL (ref 31–37)
MCV RBC AUTO: 98.1 FL
MONOCYTES # BLD AUTO: 0.63 X10(3) UL (ref 0.1–1)
MONOCYTES NFR BLD AUTO: 7.2 %
NEUTROPHILS # BLD AUTO: 6.42 X10 (3) UL (ref 1.5–7.7)
NEUTROPHILS # BLD AUTO: 6.42 X10(3) UL (ref 1.5–7.7)
NEUTROPHILS NFR BLD AUTO: 73.7 %
NONHDLC SERPL-MCNC: 134 MG/DL (ref ?–130)
OSMOLALITY SERPL CALC.SUM OF ELEC: 293 MOSM/KG (ref 275–295)
PLATELET # BLD AUTO: 224 10(3)UL (ref 150–450)
POTASSIUM SERPL-SCNC: 3.9 MMOL/L (ref 3.5–5.1)
PROT SERPL-MCNC: 6.9 G/DL (ref 5.7–8.2)
RBC # BLD AUTO: 4.76 X10(6)UL
SODIUM SERPL-SCNC: 141 MMOL/L (ref 136–145)
T4 FREE SERPL-MCNC: 1.2 NG/DL (ref 0.8–1.7)
TRIGL SERPL-MCNC: 119 MG/DL (ref 30–149)
TSI SER-ACNC: 1.17 UIU/ML (ref 0.55–4.78)
VLDLC SERPL CALC-MCNC: 20 MG/DL (ref 0–30)
WBC # BLD AUTO: 8.7 X10(3) UL (ref 4–11)

## 2025-01-27 PROCEDURE — 36415 COLL VENOUS BLD VENIPUNCTURE: CPT

## 2025-01-27 PROCEDURE — 80061 LIPID PANEL: CPT

## 2025-01-27 PROCEDURE — 86664 EPSTEIN-BARR NUCLEAR ANTIGEN: CPT

## 2025-01-27 PROCEDURE — 84439 ASSAY OF FREE THYROXINE: CPT

## 2025-01-27 PROCEDURE — 86140 C-REACTIVE PROTEIN: CPT

## 2025-01-27 PROCEDURE — 84443 ASSAY THYROID STIM HORMONE: CPT

## 2025-01-27 PROCEDURE — 80053 COMPREHEN METABOLIC PANEL: CPT

## 2025-01-27 PROCEDURE — 99214 OFFICE O/P EST MOD 30 MIN: CPT | Performed by: FAMILY MEDICINE

## 2025-01-27 PROCEDURE — 86665 EPSTEIN-BARR CAPSID VCA: CPT

## 2025-01-27 PROCEDURE — 85025 COMPLETE CBC W/AUTO DIFF WBC: CPT

## 2025-01-27 PROCEDURE — 83036 HEMOGLOBIN GLYCOSYLATED A1C: CPT

## 2025-01-27 PROCEDURE — G2211 COMPLEX E/M VISIT ADD ON: HCPCS | Performed by: FAMILY MEDICINE

## 2025-01-27 NOTE — PROGRESS NOTES
Subjective:   Tila Cassidy is a 64 year old female who presents for Consult (Patients wants to discuss multiple health concerns )       History/Other:   History of Present Illness  The patient, with a history of Aby Barr virus and lupus, presents with concerns about inconsistent readings from her inr at home blood monitoring machine. She reports that this issue has been ongoing since November and is currently in the process of getting a new machine. The patient also reports feeling confused, tired, and overwhelmed, which she attributes to stress and possibly a bad tooth that needs pulling. She expresses concern about potential diabetes and thyroid issues, given her family history of heart attacks, lupus, cancers, and dementia. The patient also mentions a previous PET scan and expresses interest in having another one for comparison. She also mentions a recent CT scan of the chest.    Sees rheum in the near future  wishes testing for many issues     Chief Complaint Reviewed and Verified  Nursing Notes Reviewed and   Verified  Tobacco Reviewed  Allergies Reviewed  Medications Reviewed    Problem List Reviewed  Medical History Reviewed  Surgical History   Reviewed  Family History Reviewed         Tobacco:  Social History     Tobacco Use   Smoking Status Every Day    Current packs/day: 0.50    Average packs/day: 0.5 packs/day for 40.0 years (20.0 ttl pk-yrs)    Types: Cigarettes   Smokeless Tobacco Never   Tobacco Comments    cutting back certainly now     E-Cigarettes/Vaping       Questions Responses    E-Cigarette Use Never User             Current Outpatient Medications   Medication Sig Dispense Refill    ALPRAZolam 1 MG Oral Tab Take 0.5 tablets (0.5 mg total) by mouth 3 (three) times daily as needed for Anxiety or Sleep. 45 tablet 1    PREDNISONE 10 MG Oral Tab TAKE 1 TABLET(10 MG) BY MOUTH DAILY 90 tablet 3    ATENOLOL 25 MG Oral Tab TAKE 1 TABLET(25 MG) BY MOUTH DAILY 90 tablet 0    warfarin 5 MG Oral  Tab Take with 3mg to equal 8mg nighlty 30 tablet 0    warfarin 4 MG Oral Tab Take 2 tablets (8 mg) nightly 60 tablet 1    warfarin 1 MG Oral Tab Take 3 tabs of 1mg (3 mg) along with one 5 mg tablet to equal 8 mg on Monday, Wednesday, Thursday Saturday and Sunday and 2 tablets along with 5 mg tablet to equal 7 mg on Tuesday and Friday.      BENAZEPRIL HCL 20 MG Oral Tab TAKE 1 TABLET(20 MG) BY MOUTH DAILY 90 tablet 1    hydroxychloroquine 200 MG Oral Tab TAKE 2 TABLETS BY MOUTH EVERY  tablet 2    Phentermine HCl 30 MG Oral Cap Take 1 capsule (30 mg total) by mouth every morning. 30 capsule 2    pantoprazole 40 MG Oral Tab EC Take 1 tablet (40 mg total) by mouth 2 (two) times daily.      Budesonide-Formoterol Fumarate 160-4.5 MCG/ACT Inhalation Aerosol Inhale 2 puffs into the lungs 2 (two) times daily.      warfarin 1 MG Oral Tab Take 3 tabs with 5mg to equal 8mg nighlty      albuterol 108 (90 Base) MCG/ACT Inhalation Aero Soln Inhale 2 puffs into the lungs every 6 (six) hours as needed for Wheezing or Shortness of Breath. 1 each 11    mupirocin 2 % External Ointment Apply topically 3 (three) times daily.      Mometasone Furoate 0.1 % External Cream Apply 1 Application topically 2 (two) times daily as needed. 45 g 0    HYDROcodone-acetaminophen (NORCO) 5-325 MG Oral Tab Take 1 tablet by mouth every 4 (four) hours as needed for Pain. 30 tablet 0    predniSONE 5 MG Oral Tab Take 1 tablet (5 mg total) by mouth daily. (Patient not taking: Reported on 1/27/2025)      predniSONE 2.5 MG Oral Tab Take 1 tablet (2.5 mg total) by mouth daily. (Patient not taking: Reported on 1/27/2025) 90 tablet 1       PHQ-2 SCORE: 0  , done 1/27/2025          Review of Systems:  Pertinent items are noted in HPI.      Objective:   /88 (BP Location: Left arm, Patient Position: Sitting, Cuff Size: large)   Pulse 81   Temp 97.9 °F (36.6 °C) (Temporal)   Resp 12   Ht 5' 8\" (1.727 m)   Wt 250 lb (113.4 kg)   SpO2 95%   BMI 38.01  kg/m²  Estimated body mass index is 38.01 kg/m² as calculated from the following:    Height as of this encounter: 5' 8\" (1.727 m).    Weight as of this encounter: 250 lb (113.4 kg).  Results  LABS  Inr readings same meter same day likely inaccurate ,monitor: 6.1, 7.1, 1.8, 2.6 mmol/L    RADIOLOGY  Chest CT: Stable linear density (12/2024)  RESULTS REVIEWED FROM PRIOR VISITS BY   DR SHANTHI Bowers and progress and imaging  reviewed in care everywhere  CT CHEST 12/2024  IMPRESSION:     1. Stable 1.5 cm left lower lobe nodule which has been present since at least 10/16/2018. Several tiny scattered ill-defined tiny nodules in both lungs again seen. Probable scarring or chronic subsegmental atelectasis in the right lower lobe. Lung RADS category: 2-benign. Recommend annual screening low-dose CT chest in patients with continued high risk of development of lung cancer.   2. Stable calcified pleural plaques, right hemithorax.     FINAL REPORT   Attending Radiologist:  Brooks Roman MD   Date Signed Off:  12/13/2024 14:44     PET SCAN 2021:    IMPRESSION:     1. No hypermetabolic uptake identified within the left lower lobe irregular opacity, which has decreased in size since the recent CT. Findings favor focal infectious/inflammatory process. Recommend CT chest follow-up in 3 months to ensure complete resolution.   2.  Redemonstrated scattered right pleural calcifications which could be related to prior asbestos exposure.   3.  Additional chronic CT findings, as detailed above.     FINAL REPORT   Attending Radiologist:  Odell Martel MD   Date Signed Off:  04/22/2021 15:19      Physical Exam    GENERAL: well developed, well nourished,   SKIN: no rashes, no suspicious lesions  bruising chronic on hands related to coumadin  RESPIRATORY: clear to percussion and auscultation  CARDIOVASCULAR: S1, S2 normal, RRR; no S3, no S4; no click; murmur negative  PSYCHIATRIC: alert and oriented x 3; affect  appropriate      Assessment & Plan:   1. EBV exposure (Primary)  -     Comp Metabolic Panel (14); Future; Expected date: 01/27/2025  -     Cancel: EBV VCA(IGG/M); Future; Expected date: 01/27/2025  -     EBV, Chronic/Active Infection,IgG/IgM; Future; Expected date: 01/27/2025  2. Other systemic lupus erythematosus with lung involvement (HCC)  Assessment & Plan:  To see specialist soon  also check c-r-p today with fatigue    Orders:  -     CBC With Differential With Platelet; Future; Expected date: 01/27/2025  -     C-Reactive Protein; Future; Expected date: 01/27/2025  3. Chronic obstructive pulmonary disease, unspecified COPD type (HCC)  -     CBC With Differential With Platelet; Future; Expected date: 01/27/2025  4. CKD stage 3a, GFR 45-59 ml/min ([facility])  Overview:  Lab Results   Component Value Date    EGFRCR 48 (L) 09/17/2024    EGFRCR 51 (L) 02/08/2024    EGFRCR 75 09/14/2023    GFRNAA 45 (L) 05/24/2022    GFRNAA 53 (L) 11/15/2021    GFRNAA 45 (L) 05/10/2021         Assessment & Plan:  Check labs today  Orders:  -     CBC With Differential With Platelet; Future; Expected date: 01/27/2025  -     Hemoglobin A1C; Future; Expected date: 01/27/2025  5. Hypertensive renal disease, stage 1 through stage 4 or unspecified chronic kidney disease  Assessment & Plan:  Last Glomerular Filtration Rate: CKD 3a (GFR 48). .   9/17/2024: Creatinine 1.26 (H) CKD etiologies : Hypertension and Autoimmune disease  Plan/Management: Control Hypertension/Diabetes  Check labs today    Orders:  -     Comp Metabolic Panel (14); Future; Expected date: 01/27/2025  6. Screening for diabetes mellitus  7. Fatigue, unspecified type  -     TSH and Free T4; Future; Expected date: 01/27/2025  -     C-Reactive Protein; Future; Expected date: 01/27/2025  8. Dyslipidemia  Overview:    On no medications    Assessment & Plan:  Cholesterol shows Good control. Long term heart-healthy diet and lifestyle discussed and encouraged to reduce risk of  cardiovascular disease.  9/17/2024: Cholesterol, Total 177; HDL Cholesterol 67 (H); Triglycerides 135; LDL Cholesterol 87  No current Cholesterol medication. stable  Continue with current treatment plan    Orders:  -     Lipid Panel; Future; Expected date: 01/27/2025  9. Pulmonary nodule 1 cm or greater in diameter  Assessment & Plan:  Consider if PET scan necessary  10. Chronic anticoagulation-coumadin  Assessment & Plan:  Current issues with monitor function and supply    Assessment & Plan  Anticoagulation Management  No changes to anticoagulation regimen.  -Continue current dose of anticoagulation.  -Obtain a new home monitoring device for more accurate readings.    Memory Concerns  Reports of forgetfulness and confusion. Possible contributing factors include stress, potential thyroid dysfunction, and history of Aby-Barr virus.  -Order comprehensive metabolic panel, thyroid function tests, and Aby-Barr virus titers.  -Consider referral to neurology if memory concerns persist or worsen.    General Health Maintenance  History of borderline diabetes and elevated cholesterol.  -Order fasting blood glucose and lipid panel.    Possible Lupus Flare  History of lupus, currently in remission. Reports of feeling unwell and stressed.  -Plan to discuss with rheumatologist at upcoming appointment.    PET Scan  Request for repeat PET scan for comparison to previous scan done approximately four years ago.  -Review previous PET scan results and consider ordering a repeat scan if clinically indicated.        No follow-ups on file.        Sandip Medrano MD, 1/27/2025, 9:35 AM

## 2025-01-27 NOTE — TELEPHONE ENCOUNTER
Received fax from [facility] INR with result of 2.6 on 1-23-25.    Dr Medrano carefully reviewed and documented:  In range, no change, check 1-4 weeks      See nurse visit on 1-23-25 for further details.

## 2025-01-27 NOTE — PROGRESS NOTES
The following individual(s) Tila Khanh verbally consented to be recorded using ambient AI listening technology and understand that they can each withdraw their consent to this listening technology at any point by asking the clinician to turn off or pause the recording:

## 2025-01-27 NOTE — ASSESSMENT & PLAN NOTE
Cholesterol shows Good control. Long term heart-healthy diet and lifestyle discussed and encouraged to reduce risk of cardiovascular disease.  9/17/2024: Cholesterol, Total 177; HDL Cholesterol 67 (H); Triglycerides 135; LDL Cholesterol 87  No current Cholesterol medication. stable  Continue with current treatment plan

## 2025-01-27 NOTE — ASSESSMENT & PLAN NOTE
Last Glomerular Filtration Rate: CKD 3a (GFR 48). .   9/17/2024: Creatinine 1.26 (H) CKD etiologies : Hypertension and Autoimmune disease  Plan/Management: Control Hypertension/Diabetes  Check labs today

## 2025-01-28 ENCOUNTER — TELEPHONE (OUTPATIENT)
Dept: FAMILY MEDICINE CLINIC | Facility: CLINIC | Age: 65
End: 2025-01-28

## 2025-01-28 LAB — INR: 2 (ref 0.8–1.2)

## 2025-01-28 NOTE — TELEPHONE ENCOUNTER
Patient believes she has a cold coming on and is going to need to increase her prednisone, would like to speak with nurse regarding this.

## 2025-01-28 NOTE — TELEPHONE ENCOUNTER
Patient notified regarding Dr Medrano recommendation on prednisone dose. Patient read back dosage directions correctly.

## 2025-01-28 NOTE — TELEPHONE ENCOUNTER
Agree to   15 mg prednisonne  Bid  5 days  Then decrease to 15 mg am only for 5 days before lower dose

## 2025-01-28 NOTE — TELEPHONE ENCOUNTER
Per patient started with cough during the night and increased shortness of breath.No fever.   Started taking mucinex DM. Patient reports increased prednisone from 5 mg to 15 mg this AM and wondering if ok to take 15 mg BID for 4-5 days.       Telephone Encounter by Barbara Clemente at 10/21/17 09:38 AM     Author:  Barbara Clemente Service:  (none) Author Type:  Patient      Filed:  10/21/17 09:39 AM Encounter Date:  10/21/2017 Status:  Signed     :  Barbara Clemente (Patient )            Mom is returning phone call.[SK1.1M]       Revision History        User Key Date/Time User Provider Type Action    > SK1.1 10/21/17 09:39 AM Barbara Clemente Patient  Sign    M - Manual

## 2025-01-29 ENCOUNTER — TELEPHONE (OUTPATIENT)
Dept: FAMILY MEDICINE CLINIC | Facility: CLINIC | Age: 65
End: 2025-01-29

## 2025-01-29 LAB
EBV NA IGG SER QL IA: POSITIVE
EBV NA IGG SER QL IA: POSITIVE
EBV VCA IGG SER QL IA: POSITIVE
EBV VCA IGG SER QL IA: POSITIVE
EBV VCA IGM SER QL IA: NEGATIVE
EBV VCA IGM SER QL IA: NEGATIVE

## 2025-01-29 NOTE — TELEPHONE ENCOUNTER
mdINR: 2.0  Current Dose: Warfarin 8 mg PO Daily   Results routed to Dr. Medrano for review. See lab results for further details.

## 2025-02-03 ENCOUNTER — TELEPHONE (OUTPATIENT)
Dept: FAMILY MEDICINE CLINIC | Facility: CLINIC | Age: 65
End: 2025-02-03

## 2025-02-04 ENCOUNTER — TELEPHONE (OUTPATIENT)
Dept: FAMILY MEDICINE CLINIC | Facility: CLINIC | Age: 65
End: 2025-02-04

## 2025-02-04 DIAGNOSIS — J44.1 CHRONIC OBSTRUCTIVE PULMONARY DISEASE WITH ACUTE EXACERBATION (HCC): Primary | ICD-10-CM

## 2025-02-04 LAB — INR: 2 (ref 0.8–1.2)

## 2025-02-04 RX ORDER — CEFPROZIL 250 MG/1
250 TABLET, FILM COATED ORAL 2 TIMES DAILY
Qty: 20 TABLET | Refills: 0 | Status: SHIPPED | OUTPATIENT
Start: 2025-02-04 | End: 2025-02-14

## 2025-02-04 NOTE — TELEPHONE ENCOUNTER
Antibiotics sent to [facility]  I do not think illness has anything to do with this current issue

## 2025-02-04 NOTE — TELEPHONE ENCOUNTER
Fax received from  [facility]  PT/INR Self Testing Service     INR 2.0 test date 2-4-25    Per patient current coumadin dose is 7 mg daily    Result entered and routed to provider.

## 2025-02-04 NOTE — TELEPHONE ENCOUNTER
Patient calling stating she has ongoing issues with productive cough, no fever or increased shortness of breath. Has been taking prednisone 15 BID. Use of inhaler and albuterol does help.    Also having issue with constipation will start miralax today. No abdominal pain at time of call.    Wondering if needs antibiotic and if recent blood work would explain any of her symptoms

## 2025-02-04 NOTE — TELEPHONE ENCOUNTER
She said that she now has an URI and is asking for an antibiotic.  She has been using the inhaler and nebulizer

## 2025-02-06 ENCOUNTER — TELEPHONE (OUTPATIENT)
Dept: FAMILY MEDICINE CLINIC | Facility: CLINIC | Age: 65
End: 2025-02-06

## 2025-02-06 NOTE — TELEPHONE ENCOUNTER
Reviewed with patient that next INR is due on 2-11-25. Per patient pharmacy had to order prescription for antibiotic and is supposed to be in today. If she is not able to  today agrees to contact office

## 2025-02-06 NOTE — TELEPHONE ENCOUNTER
PATIENT CANCELED INR APPOINTMENT TODAY, SHE IS NOT SURE IF SHE NEEDS TO RESCHEDULE IT? ALSO HAS QUESTIONS ABOUT MEDICATIONS, CALL PATIENT BACK

## 2025-02-11 ENCOUNTER — TELEPHONE (OUTPATIENT)
Dept: FAMILY MEDICINE CLINIC | Facility: CLINIC | Age: 65
End: 2025-02-11

## 2025-02-11 LAB — INR: 3.3 (ref 0.8–1.2)

## 2025-02-11 NOTE — TELEPHONE ENCOUNTER
Fax received from  [facility]  PT/INR Self Testing Service      INR 3.3 test date 2-11-25     Per patient current coumadin dose is 7 mg daily     Result entered and routed to provider.

## 2025-02-11 NOTE — TELEPHONE ENCOUNTER
Dr Medrano carefully reviewed result and documented:    Decrease to 6 mg daily    Patient notified regarding above and verbalized understanding and agreement to take   coumadin 6 mg daily.

## 2025-02-13 DIAGNOSIS — M17.0 PRIMARY OSTEOARTHRITIS OF BOTH KNEES: ICD-10-CM

## 2025-02-13 NOTE — TELEPHONE ENCOUNTER
Request for refill on [place] 5-325    LOV  1-27-25    LAST LAB  1-27-25    LAST RX  12-20-24  #30    Next OV    Future Appointments   Date Time Provider Department Center   2/27/2025 10:00 AM Kris Salinas MD [facility]       PROTOCOL    Controlled Substance Medication [id number]/13/2025 03:49 PM    This medication is a controlled substance - forward to provider to refill    Medication is active on med list

## 2025-02-14 RX ORDER — HYDROCODONE BITARTRATE AND ACETAMINOPHEN 5; 325 MG/1; MG/1
1 TABLET ORAL EVERY 4 HOURS PRN
Qty: 30 TABLET | Refills: 0 | Status: SHIPPED | OUTPATIENT
Start: 2025-02-14 | End: 2025-03-16

## 2025-02-18 DIAGNOSIS — M17.0 PRIMARY OSTEOARTHRITIS OF BOTH KNEES: ICD-10-CM

## 2025-02-18 DIAGNOSIS — J44.1 CHRONIC OBSTRUCTIVE PULMONARY DISEASE WITH ACUTE EXACERBATION (HCC): ICD-10-CM

## 2025-02-18 LAB — INR: 1.9 (ref 0.8–1.2)

## 2025-02-18 RX ORDER — HYDROCODONE BITARTRATE AND ACETAMINOPHEN 5; 325 MG/1; MG/1
1 TABLET ORAL EVERY 4 HOURS PRN
Qty: 30 TABLET | Refills: 0 | Status: SHIPPED | OUTPATIENT
Start: 2025-02-18 | End: 2025-03-20

## 2025-02-18 RX ORDER — CEFPROZIL 250 MG/1
250 TABLET, FILM COATED ORAL 2 TIMES DAILY
Qty: 20 TABLET | Refills: 0 | Status: SHIPPED | OUTPATIENT
Start: 2025-02-18 | End: 2025-02-28

## 2025-02-18 NOTE — TELEPHONE ENCOUNTER
1) INR entered, see result note for further details    2) Request for refill on [place]     LOV  1-27-25    LAST LAB  1-27-25    LAST RX  2-14-25  #30    Next OV    Future Appointments   Date Time Provider Department Center   2/27/2025 10:00 AM Kris Salinas MD [facility]     PROTOCOL    Controlled Substance Medication [id number]/18/2025 11:34 AM    This medication is a controlled substance - forward to provider to refill    Medication is active on med list          3) Patient finished antibiotic on 2-16-25 and has noticed improvement but still with Right ear pain, productive cough,no fever, and has decreased prednisone down to 10 mg and using inhaler.

## 2025-02-18 NOTE — TELEPHONE ENCOUNTER
Patient notified regarding prescriptions and to stay on same dose of coumadin and recheck in one week

## 2025-02-25 ENCOUNTER — TELEPHONE (OUTPATIENT)
Dept: FAMILY MEDICINE CLINIC | Facility: CLINIC | Age: 65
End: 2025-02-25

## 2025-02-25 LAB — INR: 2.8 (ref 0.8–1.2)

## 2025-02-25 NOTE — TELEPHONE ENCOUNTER
Received fax from [facility] INR with result of 2.8 on 2-25-25.       Current coumadin dose is 6 mg daily    Result entered and routed to provider.

## 2025-02-27 ENCOUNTER — OFFICE VISIT (OUTPATIENT)
Dept: RHEUMATOLOGY | Facility: CLINIC | Age: 65
End: 2025-02-27
Payer: MEDICARE

## 2025-02-27 VITALS
HEIGHT: 68 IN | HEART RATE: 100 BPM | OXYGEN SATURATION: 96 % | DIASTOLIC BLOOD PRESSURE: 88 MMHG | RESPIRATION RATE: 16 BRPM | SYSTOLIC BLOOD PRESSURE: 136 MMHG | WEIGHT: 250 LBS | BODY MASS INDEX: 37.89 KG/M2 | TEMPERATURE: 98 F

## 2025-02-27 DIAGNOSIS — M32.9 SYSTEMIC LUPUS ERYTHEMATOSUS, UNSPECIFIED SLE TYPE, UNSPECIFIED ORGAN INVOLVEMENT STATUS (HCC): ICD-10-CM

## 2025-02-27 DIAGNOSIS — E66.812 CLASS 2 SEVERE OBESITY DUE TO EXCESS CALORIES WITH SERIOUS COMORBIDITY AND BODY MASS INDEX (BMI) OF 38.0 TO 38.9 IN ADULT (HCC): Primary | ICD-10-CM

## 2025-02-27 DIAGNOSIS — E78.5 HYPERLIPIDEMIA LDL GOAL <100: ICD-10-CM

## 2025-02-27 DIAGNOSIS — E66.9 OBESITY (BMI 35.0-39.9 WITHOUT COMORBIDITY): ICD-10-CM

## 2025-02-27 DIAGNOSIS — E66.01 CLASS 2 SEVERE OBESITY DUE TO EXCESS CALORIES WITH SERIOUS COMORBIDITY AND BODY MASS INDEX (BMI) OF 38.0 TO 38.9 IN ADULT (HCC): Primary | ICD-10-CM

## 2025-02-27 DIAGNOSIS — Z79.01 CHRONIC ANTICOAGULATION: ICD-10-CM

## 2025-02-27 PROCEDURE — 99214 OFFICE O/P EST MOD 30 MIN: CPT | Performed by: INTERNAL MEDICINE

## 2025-02-27 RX ORDER — HYDROXYCHLOROQUINE SULFATE 200 MG/1
TABLET, FILM COATED ORAL
Qty: 180 TABLET | Refills: 2 | Status: SHIPPED | OUTPATIENT
Start: 2025-02-27

## 2025-02-27 RX ORDER — PHENTERMINE HYDROCHLORIDE 30 MG/1
30 CAPSULE ORAL EVERY MORNING
Qty: 30 CAPSULE | Refills: 5 | Status: SHIPPED | OUTPATIENT
Start: 2025-02-27

## 2025-02-27 NOTE — PROGRESS NOTES
[facility]  Dr. Salinas Progress Note     Subjective:   Tila Cassidy is a(n) 64 year old female.   Current complaints:   Chief Complaint   Patient presents with    Lupus    Follow - Up     LOV: 8/29/24  Patient is here for a follow up visit. Patient states that her hands and elbows are getting worse. Pain hands (4/10) and elbows (4/10). Hands have started shaking   Rapid 3: 4.7   History of Lupus.  History of PE 2012.  On Coumadin daily.  On Plaquenil 200 mg twice a day.  Uses Norco for breakthrough pain.  History of GERD also.     End of November had Aby Barr virus infection.   Joint pain today hands.  Left hand shakes at times.  Right hand gets numb whn driving .   Breathing is ok.    No rashes.  Had increased Prednisone with her infection.  Prednisone now at 10 mg per day.    Objective:   /88   Pulse 100   Temp 97.6 °F (36.4 °C)   Resp 16   Ht 5' 8\" (1.727 m)   Wt 250 lb (113.4 kg)   SpO2 96%   BMI 38.01 kg/m²   Lungs clear  Heart nsr  Abd obese soft  Letgs no edema   No skin rash.    2/25/25  INR 2.8   1/27/25  Glucose 89   HB A1C  5.9  CRP  0.8   TSH  1.166  Free T4  1.2   Cholesterol 201       CBC OK  8.7/15.6/224,000  Assessment:     Encounter Diagnoses   Name Primary?    Systemic lupus erythematosus, unspecified SLE type, unspecified organ involvement status (HCC)     Obesity (BMI 35.0-39.9 without comorbidity)     Class 2 severe obesity due to excess calories with serious comorbidity and body mass index (BMI) of 38.0 to 38.9 in adult (HCC) Yes    Chronic anticoagulation-coumadin     Hyperlipidemia LDL goal <100    Lupus stable.   Plan:     Patient Instructions   Continue Plaquenil 200 mg twice a day for treatment of lupus.  Eye exam once a year while on Plaquenil.  Prednisone was recently increased with infection and lung issues.  Now that you are better keep the prednisone to 10 mg a day for 1 week and then after that if doing well lowered to 5 mg a day.  Pantoprazole once a  day for GERD.  Phentermine 30 mg a day for weight loss.  Your bad cholesterol was above normal.  LDL was 113 we like it below 100.  Therefore maintain a low-fat diet.  Avoid junk food, avoid cookies cakes, avoid the yoke of the egg.  Just check the labels on food and get foods that are low in cholesterol.  More fruit and vegetables.  Return to office for recheck 6 months.        Kris Salinas MD 2/27/2025 10:13 AM

## 2025-02-27 NOTE — PATIENT INSTRUCTIONS
Continue Plaquenil 200 mg twice a day for treatment of lupus.  Eye exam once a year while on Plaquenil.  Prednisone was recently increased with infection and lung issues.  Now that you are better keep the prednisone to 10 mg a day for 1 week and then after that if doing well lowered to 5 mg a day.  Pantoprazole once a day for GERD.  Phentermine 30 mg a day for weight loss.  Your bad cholesterol was above normal.  LDL was 113 we like it below 100.  Therefore maintain a low-fat diet.  Avoid junk food, avoid cookies cakes, avoid the yoke of the egg.  Just check the labels on food and get foods that are low in cholesterol.  More fruit and vegetables.  Return to office for recheck 6 months.

## 2025-03-04 ENCOUNTER — MED REC SCAN ONLY (OUTPATIENT)
Dept: FAMILY MEDICINE CLINIC | Facility: CLINIC | Age: 65
End: 2025-03-04

## 2025-03-04 ENCOUNTER — TELEPHONE (OUTPATIENT)
Dept: FAMILY MEDICINE CLINIC | Facility: CLINIC | Age: 65
End: 2025-03-04

## 2025-03-04 LAB — INR: 1.8 (ref 0.8–1.2)

## 2025-03-04 NOTE — TELEPHONE ENCOUNTER
Received fax from [facility] INR with result of 1.8 on 3-4-25       Current coumadin dose is 6 mg daily     Result entered and routed to provider.

## 2025-03-04 NOTE — TELEPHONE ENCOUNTER
Dr Medrano reviewed and advises no change, recheck one week.    Patient notified and verbalized understanding and agreement.

## 2025-03-11 ENCOUNTER — TELEPHONE (OUTPATIENT)
Dept: FAMILY MEDICINE CLINIC | Facility: CLINIC | Age: 65
End: 2025-03-11

## 2025-03-11 LAB — INR: 1.8 (ref 0.8–1.2)

## 2025-03-11 NOTE — TELEPHONE ENCOUNTER
Verbal order  Dr Medrano no coumadin dose change and check one week.   Patient notified and verbalized understanding.

## 2025-03-11 NOTE — TELEPHONE ENCOUNTER
Received fax from [facility] INR with result of 1.8 on 3-11-25       Current coumadin dose is 6 mg daily     Result entered and routed to provider.

## 2025-03-17 DIAGNOSIS — Z79.01 ANTICOAGULATION MANAGEMENT ENCOUNTER: ICD-10-CM

## 2025-03-17 DIAGNOSIS — F13.20 SEDATIVE, HYPNOTIC OR ANXIOLYTIC DEPENDENCE, UNCOMPLICATED (HCC): ICD-10-CM

## 2025-03-17 DIAGNOSIS — Z51.81 ANTICOAGULATION MANAGEMENT ENCOUNTER: ICD-10-CM

## 2025-03-17 RX ORDER — ALPRAZOLAM 1 MG/1
0.5 TABLET ORAL 3 TIMES DAILY PRN
Qty: 45 TABLET | Refills: 1 | Status: SHIPPED | OUTPATIENT
Start: 2025-03-17

## 2025-03-17 NOTE — TELEPHONE ENCOUNTER
LOV: 9-  LAST LAB: 1/27/25  LAST RX:  Medication Quantity Refills Start End   ALPRAZolam 1 MG Oral Tab 45 tablet 1 1/21/2025 --   Sig:   Take 0.5 tablets (0.5 mg total) by mouth 3 (three) times daily as needed for Anxiety or Sleep.     Next OV:   Future Appointments   Date Time Provider Department Center   8/27/2025 11:00 AM Kris Salinas MD [facility]      PROTOCOL:

## 2025-03-17 NOTE — TELEPHONE ENCOUNTER
LOV: 1/27/25    LAST LAB:3/11/25    LAST RX:    warfarin 5 MG Oral Tab 30 tablet 0 1/15/2025 --   Sig:   Take with 3mg to equal 8mg nighlty       Medication Quantity Refills Start End   BENAZEPRIL HCL 20 MG Oral Tab 90 tablet 1 9/16/2024 --   Sig:   TAKE 1 TABLET(20 MG) BY MOUTH DAILY       Next OV:   Future Appointments   Date Time Provider Department Center   8/27/2025 11:00 AM Kris Salinas MD [facility]          PROTOCOL:

## 2025-03-18 ENCOUNTER — TELEPHONE (OUTPATIENT)
Dept: FAMILY MEDICINE CLINIC | Facility: CLINIC | Age: 65
End: 2025-03-18

## 2025-03-18 LAB — INR: 2.9 (ref 0.8–1.2)

## 2025-03-18 RX ORDER — BENAZEPRIL HYDROCHLORIDE 20 MG/1
20 TABLET ORAL DAILY
Qty: 90 TABLET | Refills: 1 | Status: SHIPPED | OUTPATIENT
Start: 2025-03-18

## 2025-03-18 RX ORDER — WARFARIN SODIUM 5 MG/1
TABLET ORAL
Qty: 30 TABLET | Refills: 0 | Status: SHIPPED | OUTPATIENT
Start: 2025-03-18

## 2025-03-18 NOTE — TELEPHONE ENCOUNTER
Received fax from [facility] INR with result of 2.9 on 3-18-25       Current coumadin dose is 6 mg daily     Result entered and routed to provider.

## 2025-03-18 NOTE — TELEPHONE ENCOUNTER
Hypertension Medications Protocol Passed03/17/2025 04:30 PM   Protocol Details CMP or BMP in past 12 months    Last BP reading less than 140/90    In person appointment or virtual visit in the past 12 mos or appointment in next 3 mos    EGFRCR or GFRNAA > 50    Medication is active on med list

## 2025-03-18 NOTE — TELEPHONE ENCOUNTER
Per Dr Medrano, level ok, check one week on same dose    Patient notified and verbalized understanding.

## 2025-03-25 ENCOUNTER — TELEPHONE (OUTPATIENT)
Dept: FAMILY MEDICINE CLINIC | Facility: CLINIC | Age: 65
End: 2025-03-25

## 2025-03-25 LAB — INR: 2 (ref 0.8–1.2)

## 2025-03-25 NOTE — TELEPHONE ENCOUNTER
Dr Medrano reviewed document and noted:  Good, no change, check one week.    Patient notified and verbalized understanding.

## 2025-03-25 NOTE — TELEPHONE ENCOUNTER
Received fax from [facility] INR with result of 2.0 on 3-25-25       Current coumadin dose is 6 mg daily     Result entered and routed to provider.

## 2025-03-29 DIAGNOSIS — Z79.01 ANTICOAGULATION MANAGEMENT ENCOUNTER: ICD-10-CM

## 2025-03-29 DIAGNOSIS — Z51.81 ANTICOAGULATION MANAGEMENT ENCOUNTER: ICD-10-CM

## 2025-03-29 RX ORDER — WARFARIN SODIUM 6 MG/1
6 TABLET ORAL NIGHTLY
Qty: 30 TABLET | Refills: 1 | Status: SHIPPED | OUTPATIENT
Start: 2025-03-29

## 2025-03-29 NOTE — TELEPHONE ENCOUNTER
Last office visit: 1/27/25  Future Appointments   Date Time Provider Department Center   8/27/2025 11:00 AM Kris Salinas MD [facility]   Last filled: varies  Last labs: INR 3/25/25  Current dose is 6 mg nightly

## 2025-04-01 ENCOUNTER — TELEPHONE (OUTPATIENT)
Dept: FAMILY MEDICINE CLINIC | Facility: CLINIC | Age: 65
End: 2025-04-01

## 2025-04-01 DIAGNOSIS — Z79.01 ANTICOAGULATION MANAGEMENT ENCOUNTER: ICD-10-CM

## 2025-04-01 DIAGNOSIS — Z51.81 ANTICOAGULATION MANAGEMENT ENCOUNTER: ICD-10-CM

## 2025-04-01 LAB — INR: 2.4 (ref 0.8–1.2)

## 2025-04-01 NOTE — TELEPHONE ENCOUNTER
Received fax from [facility] INR with result of 2.4 on 4-1-2025       Current coumadin dose is 6 mg daily     Result entered and routed to provider.

## 2025-04-01 NOTE — TELEPHONE ENCOUNTER
Dr Medrano advised no change in dosage and check one week    Patient notified and verbalized understanding.

## 2025-04-01 NOTE — TELEPHONE ENCOUNTER
Faxed refill request for warfarin 4 mg    LOV  1-27-25    LAST LAB  1-27-25    LAST RX  2-16-25  #60    Next OV    Future Appointments   Date Time Provider Department Center   8/27/2025 11:00 AM Kris Salinas MD [facility]

## 2025-04-02 RX ORDER — WARFARIN SODIUM 4 MG/1
TABLET ORAL
Qty: 60 TABLET | Refills: 1 | Status: SHIPPED | OUTPATIENT
Start: 2025-04-02

## 2025-04-08 ENCOUNTER — TELEPHONE (OUTPATIENT)
Dept: FAMILY MEDICINE CLINIC | Facility: CLINIC | Age: 65
End: 2025-04-08

## 2025-04-08 LAB — INR: 3.1 (ref 0.8–1.2)

## 2025-04-08 NOTE — TELEPHONE ENCOUNTER
Received fax from [facility] INR with result of 3.1  on 4-8-2025       Current coumadin dose is 6 mg daily     Result entered and routed to provider. Report to scan.

## 2025-04-15 ENCOUNTER — TELEPHONE (OUTPATIENT)
Dept: FAMILY MEDICINE CLINIC | Facility: CLINIC | Age: 65
End: 2025-04-15

## 2025-04-15 LAB — INR: 2.5 (ref 0.8–1.2)

## 2025-04-15 NOTE — TELEPHONE ENCOUNTER
Received fax from [facility] INR with result of 2.5 on 4-       Current coumadin dose is 6 mg daily     Result entered and routed to provider. Report to scan.

## 2025-04-15 NOTE — TELEPHONE ENCOUNTER
INR is great. Continue current dose and recheck in 1 week.   Written by Mere Osorio DO on 4/15/2025  3:01 PM CDT    Patient notified, verbalized understanding and agreement.

## 2025-04-16 DIAGNOSIS — F13.20 SEDATIVE, HYPNOTIC OR ANXIOLYTIC DEPENDENCE, UNCOMPLICATED (HCC): ICD-10-CM

## 2025-04-16 RX ORDER — ALPRAZOLAM 1 MG/1
0.5 TABLET ORAL 3 TIMES DAILY PRN
Qty: 45 TABLET | Refills: 1 | Status: SHIPPED | OUTPATIENT
Start: 2025-04-16

## 2025-04-16 NOTE — TELEPHONE ENCOUNTER
Last office visit: 1/27/25  Last refill: 3/17/25  Labs Due: 3/17/25  Future Appointments   Date Time Provider Department Center   8/27/2025 11:00 AM Kris Salinas MD [facility]

## 2025-04-16 NOTE — TELEPHONE ENCOUNTER
Patient needs a refill on -    ALPRAZolam 1 MG Oral Tab     [id number] - [place], [place] - [address] AT [facility] [address] [phone], [phone]     Thank you

## 2025-04-23 ENCOUNTER — TELEPHONE (OUTPATIENT)
Dept: FAMILY MEDICINE CLINIC | Facility: CLINIC | Age: 65
End: 2025-04-23

## 2025-04-23 LAB — INR: 2.3 (ref 0.8–1.2)

## 2025-04-28 DIAGNOSIS — M17.0 PRIMARY OSTEOARTHRITIS OF BOTH KNEES: ICD-10-CM

## 2025-04-28 NOTE — TELEPHONE ENCOUNTER
Request for refill on hydrocodone    LOV  1-27-25    LAST LAB  1-27-25    LAST RX  2-18-25  #30    Next OV    Future Appointments   Date Time Provider Department Center   8/27/2025 11:00 AM Kris Salinas MD [facility]     PROTOCOL  Controlled Substance Medication [id number]/28/2025 09:38 AM    This medication is a controlled substance - forward to provider to refill    Medication is active on med list       Patient states can wait until Dr Medrano returns to the office

## 2025-04-29 ENCOUNTER — TELEPHONE (OUTPATIENT)
Dept: FAMILY MEDICINE CLINIC | Facility: CLINIC | Age: 65
End: 2025-04-29

## 2025-04-29 LAB — INR: 1.4 (ref 0.8–1.2)

## 2025-04-29 RX ORDER — HYDROCODONE BITARTRATE AND ACETAMINOPHEN 5; 325 MG/1; MG/1
1 TABLET ORAL EVERY 4 HOURS PRN
Qty: 30 TABLET | Refills: 0 | Status: SHIPPED | OUTPATIENT
Start: 2025-04-29 | End: 2025-05-29

## 2025-04-29 NOTE — TELEPHONE ENCOUNTER
Received fax from [facility] INR with result of 1.4 on 4-       Current coumadin dose is 6 mg daily     Result entered and routed to provider. Report to scan.

## 2025-04-30 NOTE — TELEPHONE ENCOUNTER
Letter printed and placed up front for . Message left for patient to call back, awaiting response.

## 2025-05-01 ENCOUNTER — TELEPHONE (OUTPATIENT)
Dept: FAMILY MEDICINE CLINIC | Facility: CLINIC | Age: 65
End: 2025-05-01

## 2025-05-01 NOTE — TELEPHONE ENCOUNTER
Spoke with patient who states has had a rash bilateral shoulders, neck pain and bilateral external ear pain off/on for two weeks. She is not sure if due to EBV. She is also concerned about non-hodgkins because she had an uncle who passed away with that condition and remembers he also had a rash.    Appointment scheduled for evaluation  Future Appointments   Date Time Provider Department Center   5/5/2025  2:00 PM Sandip Medrano MD [facility] [place]   8/27/2025 11:00 AM [facility] [place]     Travel out of state? No  Other symptoms: ie fever,congestion, cough? No  Where did rash start? Bilateral  shoulders  When did rash start? 2 weeks ago   Vaccinated for MMR? Unsure  Born before 1958 (presumed immunity) No

## 2025-05-05 ENCOUNTER — OFFICE VISIT (OUTPATIENT)
Dept: FAMILY MEDICINE CLINIC | Facility: CLINIC | Age: 65
End: 2025-05-05
Payer: MEDICARE

## 2025-05-05 VITALS
TEMPERATURE: 97 F | WEIGHT: 253.13 LBS | SYSTOLIC BLOOD PRESSURE: 119 MMHG | DIASTOLIC BLOOD PRESSURE: 80 MMHG | HEART RATE: 77 BPM | BODY MASS INDEX: 38.36 KG/M2 | RESPIRATION RATE: 12 BRPM | HEIGHT: 68 IN | OXYGEN SATURATION: 100 %

## 2025-05-05 DIAGNOSIS — L30.9 DERMATITIS: Primary | ICD-10-CM

## 2025-05-05 DIAGNOSIS — F43.9 STRESS AT HOME: ICD-10-CM

## 2025-05-05 NOTE — PROGRESS NOTES
Subjective:   Tila Cassidy is a 64 year old female who presents for Rash (Shoulder for 3weeks )       History/Other:   History of Present Illness  Tila Cassidy is a 64 year old female who presents with a rash and associated symptoms.    She has experienced a rash for the past three weeks, located between her bra straps and neck. Initially, the rash was itchy and rough, and at one point, it felt like a sunburn from shoulder to shoulder, causing burning and pain, especially at night. The rash has improved and is almost gone now.    She reports no fever. Her ears were hurting, not inside but around the outer [place]. She attributes the rash to stress, particularly related to her children living with her.    She has not taken any specific medication for the rash but uses Xanax and generic Norco for other conditions. She inquired about using hydrocortisone cream and Benadryl for relief.    Her social history includes living with her children, who have been a source of stress. She has a one-year-old terrier puppy and a seven-year-old labrador/ Japanese rosado, which she describes as her service dog. She has been homeschooling her 12-year-old granddaughter but plans for her to return to school next year.   Chief Complaint Reviewed and Verified  Nursing Notes Reviewed and   Verified  Tobacco Reviewed  Medications Reviewed  OB Status Reviewed             Current Medications[1]      Review of Systems:  Pertinent items are noted in HPI.      Objective:   /80 (BP Location: Right arm, Patient Position: Sitting, Cuff Size: large)   Pulse 77   Temp 97.2 °F (36.2 °C) (Temporal)   Resp 12   Ht 5' 8\" (1.727 m)   Wt 253 lb 2 oz (114.8 kg)   SpO2 100%   BMI 38.49 kg/m²  Estimated body mass index is 38.49 kg/m² as calculated from the following:    Height as of this encounter: 5' 8\" (1.727 m).    Weight as of this encounter: 253 lb 2 oz (114.8 kg).  Results         Physical Exam  SKIN: Skin appears normal with slight  improvement.  No pallor jaundice  No icterus  No diaphoresis  warm and dry  Has faint hint of perhaps prior rash   has photo from few days ago that shows macular rash in the area      Assessment & Plan:   1. Dermatitis (Primary)  Comments:  resolved   can use otc hydrocortisone in future and also benadryl  2. Stress at home  Comments:  possibly cause of the rash    Assessment & Plan  Rash  Rash between bra straps and neck, improving, possibly stress or allergy-related. No fever. Xanax and Norco used for other conditions.  - Recommend Benadryl for symptomatic relief.  - Advise hydrocortisone cream for inflammation and potential allergic reaction.    Stress-related symptoms  Stress from children living with her contributing to symptoms. Rash improving, no suspicion of shingles or nerve issue.  - Recommend Benadryl for symptomatic relief.  - Advise hydrocortisone cream for inflammation and potential allergic reaction.        No follow-ups on file.        Sandip Medrano MD, 5/5/2025, 3:51 PM             [1]   Current Outpatient Medications   Medication Sig Dispense Refill    HYDROcodone-acetaminophen (NORCO) 5-325 MG Oral Tab Take 1 tablet by mouth every 4 (four) hours as needed for Pain. 30 tablet 0    ALPRAZolam 1 MG Oral Tab Take 0.5 tablets (0.5 mg total) by mouth 3 (three) times daily as needed for Anxiety or Sleep. 45 tablet 1    warfarin 4 MG Oral Tab Take 2 tablets (8 mg) nightly 60 tablet 1    warfarin 6 MG Oral Tab Take 1 tablet (6 mg total) by mouth nightly. 30 tablet 1    Benazepril HCl 20 MG Oral Tab Take 1 tablet (20 mg total) by mouth daily. 90 tablet 1    hydroxychloroquine 200 MG Oral Tab TAKE 2 TABLETS BY MOUTH EVERY  tablet 2    Phentermine HCl 30 MG Oral Cap Take 1 capsule (30 mg total) by mouth every morning. 30 capsule 5    PREDNISONE 10 MG Oral Tab TAKE 1 TABLET(10 MG) BY MOUTH DAILY 90 tablet 3    ATENOLOL 25 MG Oral Tab TAKE 1 TABLET(25 MG) BY MOUTH DAILY 90 tablet 0    warfarin 1 MG Oral  Tab Take 3 tabs of 1mg (3 mg) along with one 5 mg tablet to equal 8 mg on Monday, Wednesday, Thursday Saturday and Sunday and 2 tablets along with 5 mg tablet to equal 7 mg on Tuesday and Friday.      pantoprazole 40 MG Oral Tab EC Take 1 tablet (40 mg total) by mouth 2 (two) times daily.      Budesonide-Formoterol Fumarate 160-4.5 MCG/ACT Inhalation Aerosol Inhale 2 puffs into the lungs 2 (two) times daily.      albuterol 108 (90 Base) MCG/ACT Inhalation Aero Soln Inhale 2 puffs into the lungs every 6 (six) hours as needed for Wheezing or Shortness of Breath. 1 each 11    mupirocin 2 % External Ointment Apply topically 3 (three) times daily.      Mometasone Furoate 0.1 % External Cream Apply 1 Application topically 2 (two) times daily as needed. 45 g 0

## 2025-05-05 NOTE — PROGRESS NOTES
The following individual(s) verbally consented to be recorded using ambient AI listening technology and understand that they can each withdraw their consent to this listening technology at any point by asking the clinician to turn off or pause the recording:    Patient name: Tila L Khanh  Additional names:

## 2025-05-06 ENCOUNTER — TELEPHONE (OUTPATIENT)
Dept: FAMILY MEDICINE CLINIC | Facility: CLINIC | Age: 65
End: 2025-05-06

## 2025-05-06 LAB — INR: 1.4 (ref 0.8–1.2)

## 2025-05-06 NOTE — TELEPHONE ENCOUNTER
Received fax from [facility] INR with result of 1.4 on 5-6-2025       Current coumadin dose is 6.5 mg a day on mon wd Friday 6 on sat sun tue thur        Result entered and routed to provider. Report to scan.

## 2025-05-13 ENCOUNTER — TELEPHONE (OUTPATIENT)
Dept: FAMILY MEDICINE CLINIC | Facility: CLINIC | Age: 65
End: 2025-05-13

## 2025-05-13 LAB — INR: 1.7 (ref 0.8–1.2)

## 2025-05-13 NOTE — TELEPHONE ENCOUNTER
Received fax from [facility] INR with result of 1.7 on 5-       Current coumadin dose is :   6.5 5 days =m-we-fr-sa-lopez  6 mg 2 days =tu-th       Result entered and routed to provider. Report to scan.

## 2025-05-20 ENCOUNTER — TELEPHONE (OUTPATIENT)
Dept: FAMILY MEDICINE CLINIC | Facility: CLINIC | Age: 65
End: 2025-05-20

## 2025-05-20 DIAGNOSIS — Z79.01 ENCOUNTER FOR CURRENT LONG-TERM USE OF ANTICOAGULANTS: ICD-10-CM

## 2025-05-20 LAB — INR: 1.7 (ref 0.8–1.2)

## 2025-05-20 NOTE — TELEPHONE ENCOUNTER
Received fax from [facility] INR with result of 1.7 on 5-       Current coumadin dose is :   6.5 5 days =m-we-fr-sa-lopez  6 mg 2 days =tu-th       Result entered and routed to provider. Report to scan.

## 2025-05-20 NOTE — TELEPHONE ENCOUNTER
Request for refill on warfarin 1 mg    LOV  5-5-25    LAST LAB  1-27-25    LAST RX  8-26-24  #90    Next OV    Future Appointments   Date Time Provider Department Center   8/27/2025 11:00 AM Kris Salinas MD [facility] [place]       PROTOCOL  None

## 2025-05-20 NOTE — TELEPHONE ENCOUNTER
Patient notified of provider comments/recommendations regarding INR result.    Agrees to increase 6.5 mg every day and check INR in one week.

## 2025-05-21 RX ORDER — WARFARIN SODIUM 1 MG/1
TABLET ORAL
Qty: 135 TABLET | Refills: 0 | Status: SHIPPED | OUTPATIENT
Start: 2025-05-21

## 2025-05-22 ENCOUNTER — TELEPHONE (OUTPATIENT)
Dept: FAMILY MEDICINE CLINIC | Facility: CLINIC | Age: 65
End: 2025-05-22

## 2025-05-22 NOTE — TELEPHONE ENCOUNTER
Reviewed with patient started with abdominal cramping 3 days ago along with diarrhea. Not sure if due to food poisoning or flu. Has not measured temperature, mostly on the chilly side. She reports feeling better today.  Per patient has increased fluid intake, staying away from dairy products. Will adhere to BRAT diet with small frequent meals as feels better. No vomiting. Back pain, urination pain    Patient defers appointment, would prefer to monitor. Agrees to call back with persisting or worsening symptoms.    Routed as SOLOMON

## 2025-05-22 NOTE — TELEPHONE ENCOUNTER
Patient called.  She states since Tuesday night she has had cramping and diarrhea, hot/cold since.    Cramps started above belly button and then moved to all around belly button.

## 2025-05-27 ENCOUNTER — TELEPHONE (OUTPATIENT)
Dept: FAMILY MEDICINE CLINIC | Facility: CLINIC | Age: 65
End: 2025-05-27

## 2025-05-27 LAB — INR: 1.9 (ref 0.8–1.2)

## 2025-05-27 NOTE — TELEPHONE ENCOUNTER
Received fax from [facility] INR with result of 1.9 on 5-       Current coumadin dose is :  6.5 mg daily     Result entered and routed to provider. Report to scan.

## 2025-06-03 ENCOUNTER — TELEPHONE (OUTPATIENT)
Dept: FAMILY MEDICINE CLINIC | Facility: CLINIC | Age: 65
End: 2025-06-03

## 2025-06-03 LAB — INR: 1.3 (ref 0.8–1.2)

## 2025-06-03 NOTE — TELEPHONE ENCOUNTER
6/3/25  1:34 PM  Result Note  Spoke with patient   Patient states she is coming in tomorrow to recheck inr with our in-house machine and compare with her inr machine the readings and then she will decide what to do from there

## 2025-06-04 ENCOUNTER — ANTI-COAG VISIT (OUTPATIENT)
Dept: FAMILY MEDICINE CLINIC | Facility: CLINIC | Age: 65
End: 2025-06-04
Payer: MEDICARE

## 2025-06-04 DIAGNOSIS — Z79.01 CHRONIC ANTICOAGULATION: Primary | ICD-10-CM

## 2025-06-04 LAB — INR: 1.6 (ref 0.8–1.2)

## 2025-06-04 PROCEDURE — 85610 PROTHROMBIN TIME: CPT | Performed by: FAMILY MEDICINE

## 2025-06-04 PROCEDURE — 93793 ANTICOAG MGMT PT WARFARIN: CPT | Performed by: FAMILY MEDICINE

## 2025-06-04 NOTE — TELEPHONE ENCOUNTER
Patient was seen in office today for a nurse visit for an INR to compare with her home machine. See lab results for further details.

## 2025-06-04 NOTE — PROGRESS NOTES
Patient brought her her home mdINR machine to compare to our machine. Our machine showed 1.6 and her machine showed 1.4.  Currently on Warfarin 6.5 mg PO Daily

## 2025-06-10 ENCOUNTER — TELEPHONE (OUTPATIENT)
Dept: FAMILY MEDICINE CLINIC | Facility: CLINIC | Age: 65
End: 2025-06-10

## 2025-06-10 LAB — INR: 1.6 (ref 0.8–1.2)

## 2025-06-10 NOTE — TELEPHONE ENCOUNTER
Patient is getting a tooth pulled on June 24th.  Does she need an antibiotic?  Pharmacy:  [facility]     Also patient is on Warfarin, will she need to hold?

## 2025-06-10 NOTE — TELEPHONE ENCOUNTER
Patient is getting a tooth pulled on June 24th.  Does she need an antibiotic?  Pharmacy:  [facility]

## 2025-06-11 ENCOUNTER — TELEPHONE (OUTPATIENT)
Dept: FAMILY MEDICINE CLINIC | Facility: CLINIC | Age: 65
End: 2025-06-11

## 2025-06-11 DIAGNOSIS — Z79.01 ENCOUNTER FOR CURRENT LONG-TERM USE OF ANTICOAGULANTS: ICD-10-CM

## 2025-06-11 DIAGNOSIS — Z51.81 ANTICOAGULATION MANAGEMENT ENCOUNTER: ICD-10-CM

## 2025-06-11 DIAGNOSIS — Z79.01 ANTICOAGULATION MANAGEMENT ENCOUNTER: ICD-10-CM

## 2025-06-11 RX ORDER — WARFARIN SODIUM 1 MG/1
TABLET ORAL
COMMUNITY
Start: 2025-06-11

## 2025-06-11 NOTE — TELEPHONE ENCOUNTER
INR: 1.6  Current Dose: Warfarin 7 mg PO Monday, Wednesday, Friday and 6.5 mg PO all other days.     Results sent to provider. See lab results for further details.

## 2025-06-11 NOTE — TELEPHONE ENCOUNTER
After careful review, Dr. eMdrano states\"change to 6.5 mg PO Monday, Wednesday, Friday and 7 mg PO all other days\". Spoke with patient and she verbalized understanding.

## 2025-06-17 ENCOUNTER — TELEPHONE (OUTPATIENT)
Dept: FAMILY MEDICINE CLINIC | Facility: CLINIC | Age: 65
End: 2025-06-17

## 2025-06-17 DIAGNOSIS — M17.0 PRIMARY OSTEOARTHRITIS OF BOTH KNEES: ICD-10-CM

## 2025-06-17 DIAGNOSIS — F13.20 SEDATIVE, HYPNOTIC OR ANXIOLYTIC DEPENDENCE, UNCOMPLICATED (HCC): ICD-10-CM

## 2025-06-17 LAB — INR: 1.6 (ref 0.8–1.2)

## 2025-06-17 RX ORDER — HYDROCODONE BITARTRATE AND ACETAMINOPHEN 5; 325 MG/1; MG/1
1 TABLET ORAL EVERY 4 HOURS PRN
Qty: 30 TABLET | Refills: 0 | Status: SHIPPED | OUTPATIENT
Start: 2025-06-17 | End: 2025-07-17

## 2025-06-17 RX ORDER — ALPRAZOLAM 1 MG/1
0.5 TABLET ORAL 3 TIMES DAILY PRN
Qty: 45 TABLET | Refills: 1 | Status: SHIPPED | OUTPATIENT
Start: 2025-06-17

## 2025-06-17 NOTE — TELEPHONE ENCOUNTER
Request for refills on alprazolam 1 mg and Norco     LOV 5-5-25    LAST LAB  1-27-25    LAST RX  5-19-25 alprazolam #45                   4-29-25  [place] #30    Next OV    Future Appointments   Date Time Provider Department Center   8/27/2025 11:00 AM Kris Salinas MD [facility] [place]     PROTOCOL    Controlled Substance Medication [id number]/17/2025 09:18 AM    This medication is a controlled substance - forward to provider to refill    Medication is active on med list

## 2025-06-17 NOTE — TELEPHONE ENCOUNTER
ALPRAZolam 1 MG Oral Tab [[id number]]   HYDROcodone-acetaminophen (NORCO)   [id number] - [place], [place]

## 2025-06-17 NOTE — TELEPHONE ENCOUNTER
Pt advised she has been dealing with rashes for 12 years and has seen a PCP for treatment. Pt states the rash has covered her entire body and burns, she can't get an appointment with her doctor for a few days. The pt does have a rash that covers her entire body, including face.      Sharon Gunn RN  08/19/21 [id number] Received fax from [facility] INR with result of 1.6 on 6-17-25       Current coumadin dose is :  6.5 mg Monday, Wednesday, Friday   7 mg all other days     Result entered and routed to provider. Report to scan.

## 2025-06-20 ENCOUNTER — TELEPHONE (OUTPATIENT)
Dept: FAMILY MEDICINE CLINIC | Facility: CLINIC | Age: 65
End: 2025-06-20

## 2025-06-20 NOTE — TELEPHONE ENCOUNTER
Spoke with patient and agrees to contact dentist and let them know of possible infection prior to extraction. Agrees to call back if does not get resolution to issue.    Patient offered appointment to come into office today for evaluation, declined at time of call.

## 2025-06-20 NOTE — TELEPHONE ENCOUNTER
Asking for an antibiotic because she thinks she has an infection in the tooth that is going to be pulled on June 22nd

## 2025-06-24 ENCOUNTER — TELEPHONE (OUTPATIENT)
Dept: FAMILY MEDICINE CLINIC | Facility: CLINIC | Age: 65
End: 2025-06-24

## 2025-06-24 LAB — INR: 1.9 (ref 0.8–1.2)

## 2025-06-24 NOTE — TELEPHONE ENCOUNTER
Received fax from [facility] INR with result of 1.9 on 6-24-25     Current coumadin dose is :  7 mg daily     Result entered and routed to provider. Report to scan.

## 2025-07-01 ENCOUNTER — TELEPHONE (OUTPATIENT)
Dept: FAMILY MEDICINE CLINIC | Facility: CLINIC | Age: 65
End: 2025-07-01

## 2025-07-01 LAB — INR: 1.5 (ref 0.8–1.2)

## 2025-07-01 NOTE — TELEPHONE ENCOUNTER
Patient needs fax sent to [facility] for clearance for her to have a tooth pulled 7/7/25. FAX: [phone], please call patient with any questions.

## 2025-07-01 NOTE — TELEPHONE ENCOUNTER
Received fax from [facility] INR with result of 1.5 on 7-1-25     Current coumadin dose is :  7 mg daily     Result entered and routed to provider. Report to scan.

## 2025-07-01 NOTE — TELEPHONE ENCOUNTER
Call placed to [facility] [place] they have not seen Tila in past and patient is wanting tooth extraction done that day. Need clarification regarding warfarin, they cannot guarantee extraction will be done on the 7rh.    See encounter 6-10-25:  Sandip Medrano MD    6/10/25  5:04 PM  Note      Generally hold 4 days prior and resume the next day          Dentist office would like to clarify ok for patient to hold even if they are not sure extraction will be done.

## 2025-07-02 NOTE — TELEPHONE ENCOUNTER
Spoke with patient and she verbalized understanding. She was agreeable to hold 4 days prior to procedure and resume 1 day after. Letter faxed per request.

## 2025-07-07 ENCOUNTER — TELEPHONE (OUTPATIENT)
Dept: FAMILY MEDICINE CLINIC | Facility: CLINIC | Age: 65
End: 2025-07-07

## 2025-07-07 LAB — INR: 1.1 (ref 0.8–1.2)

## 2025-07-07 NOTE — TELEPHONE ENCOUNTER
Received fax from [facility] INR with result of 1.1  on 7-7-25     Current coumadin dose is :  Sandip Medrano MD to Sandip Medrano Nurse       7/2/25 12:56 PM  Result Note  This is low   she plans to stop because of procedure in a day of r so  No change in current dose  But when resuming a should take 7.5 mg and check a week after resuming            Result entered and routed to provider. Report to scan.    Follow up call to patient and has been off coumadin for 4 days due to tooth extraction today, checked level today to be sure low enough for extraction.Will resume at 7.5 mg tonight and check INR in one week as above.

## 2025-07-07 NOTE — TELEPHONE ENCOUNTER
Spoke with patient who states she did send in any other readings today other than the 1.1 earlier today (see other telephone encounter)    Call placed to [facility] INR and verified reading reported was 1.1

## 2025-07-15 ENCOUNTER — TELEPHONE (OUTPATIENT)
Dept: FAMILY MEDICINE CLINIC | Facility: CLINIC | Age: 65
End: 2025-07-15

## 2025-07-15 LAB — INR: 1.4 (ref 0.8–1.2)

## 2025-07-15 NOTE — TELEPHONE ENCOUNTER
See result note:    Sandip Medrano MD  7/15/2025  5:31 PM CDT       Too low.  lets try and increase, if it is 7-1/2 mg daily, I would like to go to 8 mg every day check in 1 week of course.     Patient notified of above.

## 2025-07-15 NOTE — TELEPHONE ENCOUNTER
Received fax from [facility] INR with result of 1.4  on 7-15-25     Spoke with patient and resumed coumadin at 7.5 mg daily one week ago        Result entered and routed to provider. Report to scan.

## 2025-07-16 ENCOUNTER — TELEPHONE (OUTPATIENT)
Dept: FAMILY MEDICINE CLINIC | Facility: CLINIC | Age: 65
End: 2025-07-16

## 2025-07-16 DIAGNOSIS — Z51.81 ANTICOAGULATION MANAGEMENT ENCOUNTER: ICD-10-CM

## 2025-07-16 DIAGNOSIS — Z79.01 ANTICOAGULATION MANAGEMENT ENCOUNTER: ICD-10-CM

## 2025-07-16 DIAGNOSIS — B37.31 CANDIDAL VAGINITIS: Primary | ICD-10-CM

## 2025-07-16 RX ORDER — FLUCONAZOLE 150 MG/1
150 TABLET ORAL DAILY
Qty: 2 TABLET | Refills: 0 | Status: SHIPPED | OUTPATIENT
Start: 2025-07-16 | End: 2025-07-18

## 2025-07-16 RX ORDER — WARFARIN SODIUM 5 MG/1
TABLET ORAL
Qty: 30 TABLET | Refills: 0 | Status: SHIPPED | OUTPATIENT
Start: 2025-07-16

## 2025-07-16 NOTE — TELEPHONE ENCOUNTER
Spoke with pt. States she finished 10 days of amox from dentsist and now with yeast infxn sx x5 days---vaginal itching, burning and small amount of thick, white discharge.  Pt has not tried anything OTC.  Pt requests oral medication for this.  [facility]

## 2025-07-16 NOTE — TELEPHONE ENCOUNTER
Patient got a yeast infection with the antibiotic. She is asking for medication. Call to [facility]

## 2025-07-16 NOTE — TELEPHONE ENCOUNTER
Patient advised that fluconozole was ordered.    Patient also requests a refill of Warfarin 5 mg.  States she takes 8 mg nightly.

## 2025-07-22 ENCOUNTER — TELEPHONE (OUTPATIENT)
Dept: FAMILY MEDICINE CLINIC | Facility: CLINIC | Age: 65
End: 2025-07-22

## 2025-07-22 DIAGNOSIS — M17.0 PRIMARY OSTEOARTHRITIS OF BOTH KNEES: ICD-10-CM

## 2025-07-22 LAB — INR: 2.2 (ref 0.8–1.2)

## 2025-07-22 RX ORDER — HYDROCODONE BITARTRATE AND ACETAMINOPHEN 5; 325 MG/1; MG/1
1 TABLET ORAL EVERY 4 HOURS PRN
Qty: 30 TABLET | Refills: 0 | Status: SHIPPED | OUTPATIENT
Start: 2025-07-22 | End: 2025-08-21

## 2025-07-22 NOTE — TELEPHONE ENCOUNTER
Patient request for refill on [place] 5-325    LOV  5-5-25    LAST LAB  1-27-25    LAST RX  6-17-25  #30    Next OV    Future Appointments   Date Time Provider Department Center   8/27/2025 11:00 AM Kris Salinas MD [facility] [place]   9/16/2025 10:20 AM Sandip Medrano MD [facility] [place]     PROTOCOL    Controlled Substance Medication [id number]/22/2025 04:55 PM    This medication is a controlled substance - forward to provider to refill    Medication is active on med list

## 2025-07-22 NOTE — TELEPHONE ENCOUNTER
Received fax from [facility] INR with result of 2.2 on 7-22-25     Current dose of coumadin 8 mg daily      Result entered and routed to provider. Report to scan.

## 2025-07-22 NOTE — TELEPHONE ENCOUNTER
Per Dr Medrano continue with same dose and recheck in one week.    Patient notified and verbalized understanding and agreement.

## 2025-07-29 ENCOUNTER — TELEPHONE (OUTPATIENT)
Dept: FAMILY MEDICINE CLINIC | Facility: CLINIC | Age: 65
End: 2025-07-29

## 2025-07-29 LAB — INR: 2.4 (ref 0.8–1.2)

## 2025-08-05 ENCOUNTER — TELEPHONE (OUTPATIENT)
Dept: FAMILY MEDICINE CLINIC | Facility: CLINIC | Age: 65
End: 2025-08-05

## 2025-08-05 LAB — INR: 2 (ref 0.8–1.2)

## 2025-08-12 ENCOUNTER — TELEPHONE (OUTPATIENT)
Dept: FAMILY MEDICINE CLINIC | Facility: CLINIC | Age: 65
End: 2025-08-12

## 2025-08-12 LAB — INR: 2.1 (ref 0.8–1.2)

## 2025-08-19 ENCOUNTER — TELEPHONE (OUTPATIENT)
Dept: FAMILY MEDICINE CLINIC | Facility: CLINIC | Age: 65
End: 2025-08-19

## 2025-08-19 DIAGNOSIS — F13.20 SEDATIVE, HYPNOTIC OR ANXIOLYTIC DEPENDENCE, UNCOMPLICATED (HCC): ICD-10-CM

## 2025-08-19 LAB — INR: 2.7 (ref 0.8–1.2)

## 2025-08-19 RX ORDER — ALPRAZOLAM 1 MG/1
0.5 TABLET ORAL 3 TIMES DAILY PRN
Qty: 45 TABLET | Refills: 1 | Status: SHIPPED | OUTPATIENT
Start: 2025-08-19

## 2025-08-24 DIAGNOSIS — I10 ESSENTIAL HYPERTENSION: ICD-10-CM

## 2025-08-25 RX ORDER — ATENOLOL 25 MG/1
25 TABLET ORAL DAILY
Qty: 90 TABLET | Refills: 0 | Status: SHIPPED | OUTPATIENT
Start: 2025-08-25

## 2025-08-26 ENCOUNTER — TELEPHONE (OUTPATIENT)
Dept: FAMILY MEDICINE CLINIC | Facility: CLINIC | Age: 65
End: 2025-08-26

## 2025-08-26 DIAGNOSIS — Z79.01 ANTICOAGULATION MANAGEMENT ENCOUNTER: ICD-10-CM

## 2025-08-26 DIAGNOSIS — Z51.81 ANTICOAGULATION MANAGEMENT ENCOUNTER: ICD-10-CM

## 2025-08-26 LAB — INR: 2.2 (ref 0.8–1.2)

## 2025-08-26 RX ORDER — WARFARIN SODIUM 5 MG/1
TABLET ORAL
Qty: 30 TABLET | Refills: 0 | Status: SHIPPED | OUTPATIENT
Start: 2025-08-26

## 2025-08-27 ENCOUNTER — OFFICE VISIT (OUTPATIENT)
Dept: RHEUMATOLOGY | Facility: CLINIC | Age: 65
End: 2025-08-27

## 2025-08-27 VITALS
HEIGHT: 69 IN | TEMPERATURE: 98 F | BODY MASS INDEX: 37.51 KG/M2 | HEART RATE: 66 BPM | DIASTOLIC BLOOD PRESSURE: 88 MMHG | WEIGHT: 253.25 LBS | SYSTOLIC BLOOD PRESSURE: 120 MMHG | OXYGEN SATURATION: 95 % | RESPIRATION RATE: 18 BRPM

## 2025-08-27 DIAGNOSIS — M32.9 SYSTEMIC LUPUS ERYTHEMATOSUS, UNSPECIFIED SLE TYPE, UNSPECIFIED ORGAN INVOLVEMENT STATUS (HCC): ICD-10-CM

## 2025-08-27 DIAGNOSIS — Z79.01 CHRONIC ANTICOAGULATION: ICD-10-CM

## 2025-08-27 DIAGNOSIS — M17.0 PRIMARY OSTEOARTHRITIS OF BOTH KNEES: ICD-10-CM

## 2025-08-27 DIAGNOSIS — E66.01 CLASS 2 SEVERE OBESITY DUE TO EXCESS CALORIES WITH SERIOUS COMORBIDITY AND BODY MASS INDEX (BMI) OF 38.0 TO 38.9 IN ADULT (HCC): Primary | ICD-10-CM

## 2025-08-27 DIAGNOSIS — E66.9 OBESITY (BMI 35.0-39.9 WITHOUT COMORBIDITY): ICD-10-CM

## 2025-08-27 DIAGNOSIS — E66.812 CLASS 2 SEVERE OBESITY DUE TO EXCESS CALORIES WITH SERIOUS COMORBIDITY AND BODY MASS INDEX (BMI) OF 38.0 TO 38.9 IN ADULT (HCC): Primary | ICD-10-CM

## 2025-08-27 PROCEDURE — 99214 OFFICE O/P EST MOD 30 MIN: CPT | Performed by: INTERNAL MEDICINE

## 2025-08-27 RX ORDER — MULTIVIT-MIN/IRON/FOLIC ACID/K 18-600-40
1 CAPSULE ORAL DAILY
COMMUNITY

## 2025-08-27 RX ORDER — PHENTERMINE HYDROCHLORIDE 30 MG/1
30 CAPSULE ORAL EVERY MORNING
Qty: 30 CAPSULE | Refills: 5 | Status: SHIPPED | OUTPATIENT
Start: 2025-08-27

## 2025-08-27 RX ORDER — HYDROXYCHLOROQUINE SULFATE 200 MG/1
TABLET, FILM COATED ORAL
Qty: 180 TABLET | Refills: 2 | Status: SHIPPED | OUTPATIENT
Start: 2025-08-27

## 2025-08-27 RX ORDER — PREDNISONE 10 MG/1
5 TABLET ORAL DAILY
Qty: 50 TABLET | Refills: 1 | Status: SHIPPED | OUTPATIENT
Start: 2025-08-27

## (undated) DIAGNOSIS — Z79.01 ENCOUNTER FOR CURRENT LONG-TERM USE OF ANTICOAGULANTS: ICD-10-CM

## (undated) DIAGNOSIS — R92.1 BREAST CALCIFICATION, RIGHT: Primary | ICD-10-CM

## (undated) DIAGNOSIS — J44.1 CHRONIC OBSTRUCTIVE PULMONARY DISEASE WITH ACUTE EXACERBATION (HCC): ICD-10-CM

## (undated) DIAGNOSIS — K52.1 ANTIBIOTIC-ASSOCIATED DIARRHEA: Primary | ICD-10-CM

## (undated) DIAGNOSIS — T36.95XA ANTIBIOTIC-ASSOCIATED DIARRHEA: Primary | ICD-10-CM

## (undated) DIAGNOSIS — I10 ESSENTIAL HYPERTENSION: ICD-10-CM

## (undated) DEVICE — GAMMEX® NON-LATEX PI TEXTURED SIZE 7.5, STERILE POLYISOPRENE POWDER-FREE SURGICAL GLOVE: Brand: GAMMEX

## (undated) DEVICE — CAUTERY PENCIL

## (undated) DEVICE — MONOFILAMENT ABSORBABLE SUTURE: Brand: MAXON

## (undated) DEVICE — TROCAR: Brand: KII SHIELDED BLADED ACCESS SYSTEM

## (undated) DEVICE — TROCARS: Brand: KII® BALLOON BLUNT TIP SYSTEM

## (undated) DEVICE — TIGERTAIL 5F FLXTIP 70CM

## (undated) DEVICE — TISSUE RETRIEVAL SYSTEM: Brand: INZII RETRIEVAL SYSTEM

## (undated) DEVICE — DRAPE C-ARM UNIVERSAL

## (undated) DEVICE — REM POLYHESIVE ADULT PATIENT RETURN ELECTRODE: Brand: VALLEYLAB

## (undated) DEVICE — SUTURE MONOCRYL 4-0 PS-2

## (undated) DEVICE — VIOLET BRAIDED (POLYGLACTIN 910), SYNTHETIC ABSORBABLE SUTURE: Brand: COATED VICRYL

## (undated) DEVICE — LAP CHOLE/APPY CDS-LF: Brand: MEDLINE INDUSTRIES, INC.

## (undated) DEVICE — #15 STERILE STAINLESS BLADE: Brand: STERILE STAINLESS BLADES

## (undated) DEVICE — NITINOL WIRE STR 035

## (undated) DEVICE — Device

## (undated) DEVICE — TROCAR: Brand: KII® SLEEVE

## (undated) DEVICE — KENDALL SCD EXPRESS SLEEVES, KNEE LENGTH, MEDIUM: Brand: KENDALL SCD

## (undated) DEVICE — CHLORAPREP 26ML APPLICATOR

## (undated) DEVICE — DISPOSABLE LAPAROSCOPIC CLIP APPLIER WITH 20 CLIPS.: Brand: EPIX® UNIVERSAL CLIP APPLIER

## (undated) NOTE — LETTER
25    Tila Cassidy  : 1960    Patient to stop Warfarin 4 days prior to procedure and resume the next day. Patient agreeable to do this even though the tooth may not be pulled at appointment on 25.         Sandip Medrano MD

## (undated) NOTE — LETTER
25    Tila Cassidy  : 1960    It is medically beneficial for patient to use incontinence pads.        Sandip Medrano MD

## (undated) NOTE — LETTER
10/04/22    Sally  Fax #:  [phone]    Patient:  Katey Armstrong  :  1960  Address:  [place] [address]  Telephone Information:   Home Phone [phone]   Mobile [phone]     Account number:  [de-identified]    Emergency Contact  Name:  Extended Emergency Contact Information  Primary Emergency Contact: [address] Phone: [phone]  Mobile Phone: [phone]  Relation: Sister  Secondary Emergency Contact: Fazal Frederick Phone: [phone]  Relation: Mother    Physician MD [address]  [facility] [address] [facility]  Phone:  [phone]  Fax:  [phone]    To Whom it may concern: The above mentioned patient requires a Nebulizer Machine powered by electricity and is needed for nebulized medications. She is dependent on this electronically operated medical equipment for breathing, and is life supporting and will be required for lifetime use. Therefore, not having electricity will aggravate the patient's medical condition. I strongly request and recommend her electricity to remain on.     Sincerely,  Vladimir Bradford MD

## (undated) NOTE — MR AVS SNAPSHOT
[facility]  [address] [address]  [phone]               Thank you for choosing us for your health care visit with Eufemia Roper DO.   We are glad to serve you and happy to provide you with this summ HYDROcodone-acetaminophen 5-325 MG Tabs   Take 1 tablet by mouth every 6 (six) hours as needed for Pain. Commonly known as:  NORCO           Hydroxychloroquine Sulfate 200 MG Tabs   200 mg.  Take 200mg tablet daily   Commonly known as:  PLAQUENIL

## (undated) NOTE — MR AVS SNAPSHOT
[address] [place] [address]  [phone]               Thank you for choosing us for your health care visit with [facility] NURSE.   We are glad to serve you and happy to provide you with this summary predniSONE 5 MG Tabs   Take 5 mg by mouth daily. Commonly known as:  DELTASONE           SYMBICORT 80-4.5 MCG/ACT Aero   Generic drug:  Budesonide-Formoterol Fumarate   Take 2 puffs by mouth daily.            TraMADol HCl 50 MG Tabs   Take 1 tablet (50 m

## (undated) NOTE — MR AVS SNAPSHOT
[place]  [address] [address]  [phone]               Thank you for choosing us for your health care visit with Jennifer Marquis DO.   We are glad to serve you and happy to provide you with this summ folic acid 1 MG Tabs   Take 2 tablets by mouth daily. Commonly known as:  FOLVITE           GLUCOSAMINE RELIEF 500 MG Caps   Generic drug:  Glucosamine Sulfate   Take 500 mg by mouth daily.            hydrochlorothiazide 25 MG Tabs   Take 1/2 tab daily Order:  Pulmonary - External              Risa Hamman, DO   [address] [address]   Phone:  [phone]   Fax:  [phone]    Diagnoses:  Systemic lupus erythematosus, unspecified SLE type, unspecified organ involvement sta authorization, such as [place], please feel free to schedule your appointment immediately. However, if you are unsure about the requirements for authorization, please wait 5-7 days and then contact your physician's office.  At that time, you will

## (undated) NOTE — MR AVS SNAPSHOT
[facility]  [address] [facility] [id number] [phone]               Thank you for choosing us for your health care visit with [facility] NURSE.   We are glad to serve you and happy to provide you with this summary Hydroxychloroquine Sulfate 200 MG Tabs   Take 1/2 tab daily   Commonly known as:  PLAQUENIL           * JANTOVEN 10 MG Tabs   Generic drug:  Warfarin Sodium   Take 10 mg by mouth once daily.            * Warfarin Sodium 10 MG Tabs   Take 1 tablet (10 mg to

## (undated) NOTE — LETTER
10/23/21        Luis Santoro  [address] [address]      Dear Diana Conde,    Our records indicate that you have outstanding lab work and or testing that was ordered for you and has not yet been completed:  Orders Placed This Encounter

## (undated) NOTE — MR AVS SNAPSHOT
[facility]azal [place]  [address] [address]  [phone]               Thank you for choosing us for your health care visit with Rod Davila DO.   We are glad to serve you and happy to provide you with this summ Take 1/2 tab daily   Commonly known as:  HYDRODIURIL           HYDROcodone-acetaminophen 5-325 MG Tabs   Take 1 tablet by mouth every 6 (six) hours as needed for Pain.    Commonly known as:  NORCO           Hydroxychloroquine Sulfate 200 MG Tabs   Take 1/2

## (undated) NOTE — LETTER
Eduardo Cruz D.O.     Surgical Clearance Needed    Date: 11/27/2018                                                                       From: Natalie Miguel    Attn: Dr. Thiago Coleman